# Patient Record
Sex: FEMALE | Race: WHITE | NOT HISPANIC OR LATINO | Employment: OTHER | URBAN - METROPOLITAN AREA
[De-identification: names, ages, dates, MRNs, and addresses within clinical notes are randomized per-mention and may not be internally consistent; named-entity substitution may affect disease eponyms.]

---

## 2017-02-21 ENCOUNTER — CONVERSION ENCOUNTER (OUTPATIENT)
Dept: GENERAL RADIOLOGY | Facility: HOSPITAL | Age: 72
End: 2017-02-21

## 2017-05-05 ENCOUNTER — CONVERSION ENCOUNTER (OUTPATIENT)
Dept: MAMMOGRAPHY | Facility: HOSPITAL | Age: 72
End: 2017-05-05

## 2018-02-23 ENCOUNTER — CONVERSION ENCOUNTER (OUTPATIENT)
Dept: GENERAL RADIOLOGY | Facility: HOSPITAL | Age: 73
End: 2018-02-23

## 2018-05-08 ENCOUNTER — CONVERSION ENCOUNTER (OUTPATIENT)
Dept: ORTHOPEDIC SURGERY | Facility: CLINIC | Age: 73
End: 2018-05-08

## 2018-05-08 ENCOUNTER — OFFICE VISIT CONVERTED (OUTPATIENT)
Dept: ORTHOPEDIC SURGERY | Facility: CLINIC | Age: 73
End: 2018-05-08
Attending: ORTHOPAEDIC SURGERY

## 2019-01-02 ENCOUNTER — HOSPITAL ENCOUNTER (OUTPATIENT)
Dept: LAB | Facility: HOSPITAL | Age: 74
Discharge: HOME | End: 2019-01-02
Attending: INTERNAL MEDICINE

## 2019-01-02 LAB
ANION GAP SERPL CALC-SCNC: 16 MMOL/L (ref 8–19)
APPEARANCE UR: CLEAR
BASOPHILS # BLD AUTO: 0.04 10*3/UL (ref 0–0.2)
BASOPHILS NFR BLD AUTO: 0.58 % (ref 0–3)
BILIRUB UR QL: NEGATIVE
BUN SERPL-MCNC: 11 MG/DL (ref 5–25)
BUN/CREAT SERPL: 16 {RATIO} (ref 6–20)
CALCIUM SERPL-MCNC: 9.4 MG/DL (ref 8.7–10.4)
CHLORIDE SERPL-SCNC: 102 MMOL/L (ref 99–111)
COLOR UR: YELLOW
CONV CO2: 26 MMOL/L (ref 22–32)
CONV COLLECTION SOURCE (UA): NORMAL
CONV UROBILINOGEN IN URINE BY AUTOMATED TEST STRIP: 0.2 {EHRLICHU}/DL (ref 0.1–1)
CREAT UR-MCNC: 0.7 MG/DL (ref 0.5–0.9)
EOSINOPHIL # BLD AUTO: 0.06 10*3/UL (ref 0–0.7)
EOSINOPHIL # BLD AUTO: 0.94 % (ref 0–7)
ERYTHROCYTE [DISTWIDTH] IN BLOOD BY AUTOMATED COUNT: 13 % (ref 11.5–14.5)
GFR SERPLBLD BASED ON 1.73 SQ M-ARVRAT: >60 ML/MIN/{1.73_M2}
GLUCOSE SERPL-MCNC: 90 MG/DL (ref 65–99)
GLUCOSE UR QL: NEGATIVE MG/DL
HBA1C MFR BLD: 14.1 G/DL (ref 12–16)
HCT VFR BLD AUTO: 41.2 % (ref 37–47)
HGB UR QL STRIP: NEGATIVE
KETONES UR QL STRIP: NEGATIVE MG/DL
LEUKOCYTE ESTERASE UR QL STRIP: NEGATIVE
LYMPHOCYTES # BLD AUTO: 1.28 10*3/UL (ref 1–5)
MCH RBC QN AUTO: 30.6 PG (ref 27–31)
MCHC RBC AUTO-ENTMCNC: 34.1 G/DL (ref 33–37)
MCV RBC AUTO: 89.5 FL (ref 81–99)
MONOCYTES # BLD AUTO: 0.62 10*3/UL (ref 0.2–1.2)
MONOCYTES NFR BLD AUTO: 9.93 % (ref 3–10)
NEUTROPHILS # BLD AUTO: 4.25 10*3/UL (ref 2–8)
NEUTROPHILS NFR BLD AUTO: 68.1 % (ref 30–85)
NITRITE UR QL STRIP: NEGATIVE
NRBC BLD AUTO-RTO: 0 % (ref 0–0.01)
OSMOLALITY SERPL CALC.SUM OF ELEC: 289 MOSM/KG (ref 273–304)
PH UR STRIP.AUTO: 7 [PH] (ref 5–8)
PLATELET # BLD AUTO: 297 10*3/UL (ref 130–400)
PMV BLD AUTO: 7.3 FL (ref 7.4–10.4)
POTASSIUM SERPL-SCNC: 4.3 MMOL/L (ref 3.5–5.3)
PROT UR QL: NEGATIVE MG/DL
RBC # BLD AUTO: 4.6 10*6/UL (ref 4.2–5.4)
SODIUM SERPL-SCNC: 140 MMOL/L (ref 135–147)
SP GR UR: 1.01 (ref 1–1.03)
VARIANT LYMPHS NFR BLD MANUAL: 20.5 % (ref 20–45)
WBC # BLD AUTO: 6.24 10*3/UL (ref 4.8–10.8)

## 2019-01-04 LAB — BACTERIA UR CULT: NORMAL

## 2019-01-09 ENCOUNTER — HOSPITAL ENCOUNTER (OUTPATIENT)
Dept: GENERAL RADIOLOGY | Facility: HOSPITAL | Age: 74
Discharge: HOME | End: 2019-01-09
Attending: INTERNAL MEDICINE

## 2019-02-27 ENCOUNTER — HOSPITAL ENCOUNTER (OUTPATIENT)
Dept: GENERAL RADIOLOGY | Facility: HOSPITAL | Age: 74
Discharge: HOME | End: 2019-02-27
Attending: NURSE PRACTITIONER

## 2019-04-12 ENCOUNTER — HOSPITAL ENCOUNTER (OUTPATIENT)
Dept: LAB | Facility: HOSPITAL | Age: 74
Discharge: HOME | End: 2019-04-12
Attending: INTERNAL MEDICINE

## 2019-04-12 LAB
25(OH)D3 SERPL-MCNC: 44.3 NG/ML (ref 30–100)
ALBUMIN SERPL-MCNC: 4 G/DL (ref 3.5–5)
ALBUMIN/GLOB SERPL: 1.3 {RATIO} (ref 1.4–2.6)
ALP SERPL-CCNC: 59 U/L (ref 43–160)
ALT SERPL-CCNC: 15 U/L (ref 10–40)
ANION GAP SERPL CALC-SCNC: 13 MMOL/L (ref 8–19)
AST SERPL-CCNC: 20 U/L (ref 15–50)
BASOPHILS # BLD AUTO: 0.04 10*3/UL (ref 0–0.2)
BASOPHILS NFR BLD AUTO: 0.8 % (ref 0–3)
BILIRUB SERPL-MCNC: 0.72 MG/DL (ref 0.2–1.3)
BUN SERPL-MCNC: 10 MG/DL (ref 5–25)
BUN/CREAT SERPL: 14 {RATIO} (ref 6–20)
CALCIUM SERPL-MCNC: 9.1 MG/DL (ref 8.7–10.4)
CHLORIDE SERPL-SCNC: 103 MMOL/L (ref 99–111)
CHOLEST SERPL-MCNC: 174 MG/DL (ref 107–200)
CHOLEST/HDLC SERPL: 2.7 {RATIO} (ref 3–6)
CONV ABS IMM GRAN: 0.03 10*3/UL (ref 0–0.2)
CONV CO2: 30 MMOL/L (ref 22–32)
CONV IMMATURE GRAN: 0.6 % (ref 0–1.8)
CONV TOTAL PROTEIN: 7.2 G/DL (ref 6.3–8.2)
CREAT UR-MCNC: 0.7 MG/DL (ref 0.5–0.9)
DEPRECATED RDW RBC AUTO: 48.1 FL (ref 36.4–46.3)
EOSINOPHIL # BLD AUTO: 0.21 10*3/UL (ref 0–0.7)
EOSINOPHIL # BLD AUTO: 4.3 % (ref 0–7)
ERYTHROCYTE [DISTWIDTH] IN BLOOD BY AUTOMATED COUNT: 14 % (ref 11.7–14.4)
EST. AVERAGE GLUCOSE BLD GHB EST-MCNC: 126 MG/DL
GFR SERPLBLD BASED ON 1.73 SQ M-ARVRAT: >60 ML/MIN/{1.73_M2}
GLOBULIN UR ELPH-MCNC: 3.2 G/DL (ref 2–3.5)
GLUCOSE SERPL-MCNC: 99 MG/DL (ref 65–99)
HBA1C MFR BLD: 13.4 G/DL (ref 12–16)
HBA1C MFR BLD: 6 % (ref 3.5–5.7)
HCT VFR BLD AUTO: 41.7 % (ref 37–47)
HDLC SERPL-MCNC: 65 MG/DL (ref 40–60)
LDLC SERPL CALC-MCNC: 93 MG/DL (ref 70–100)
LYMPHOCYTES # BLD AUTO: 1.36 10*3/UL (ref 1–5)
MCH RBC QN AUTO: 29.9 PG (ref 27–31)
MCHC RBC AUTO-ENTMCNC: 32.1 G/DL (ref 33–37)
MCV RBC AUTO: 93.1 FL (ref 81–99)
MONOCYTES # BLD AUTO: 0.64 10*3/UL (ref 0.2–1.2)
MONOCYTES NFR BLD AUTO: 13.1 % (ref 3–10)
NEUTROPHILS # BLD AUTO: 2.59 10*3/UL (ref 2–8)
NEUTROPHILS NFR BLD AUTO: 53.3 % (ref 30–85)
NRBC CBCN: 0 % (ref 0–0.7)
OSMOLALITY SERPL CALC.SUM OF ELEC: 293 MOSM/KG (ref 273–304)
PLATELET # BLD AUTO: 260 10*3/UL (ref 130–400)
PMV BLD AUTO: 10.1 FL (ref 9.4–12.3)
POTASSIUM SERPL-SCNC: 4.3 MMOL/L (ref 3.5–5.3)
RBC # BLD AUTO: 4.48 10*6/UL (ref 4.2–5.4)
SODIUM SERPL-SCNC: 142 MMOL/L (ref 135–147)
TRIGL SERPL-MCNC: 79 MG/DL (ref 40–150)
TSH SERPL-ACNC: 1.5 M[IU]/L (ref 0.27–4.2)
VARIANT LYMPHS NFR BLD MANUAL: 27.9 % (ref 20–45)
VLDLC SERPL-MCNC: 16 MG/DL (ref 5–37)
WBC # BLD AUTO: 4.87 10*3/UL (ref 4.8–10.8)

## 2019-10-14 ENCOUNTER — HOSPITAL ENCOUNTER (OUTPATIENT)
Dept: LAB | Facility: HOSPITAL | Age: 74
Discharge: HOME | End: 2019-10-14
Attending: INTERNAL MEDICINE

## 2019-10-14 LAB
25(OH)D3 SERPL-MCNC: 51.1 NG/ML (ref 30–100)
ALBUMIN SERPL-MCNC: 4.1 G/DL (ref 3.5–5)
ALBUMIN/GLOB SERPL: 1.3 {RATIO} (ref 1.4–2.6)
ALP SERPL-CCNC: 63 U/L (ref 43–160)
ALT SERPL-CCNC: 17 U/L (ref 10–40)
ANION GAP SERPL CALC-SCNC: 15 MMOL/L (ref 8–19)
AST SERPL-CCNC: 20 U/L (ref 15–50)
BILIRUB SERPL-MCNC: 0.82 MG/DL (ref 0.2–1.3)
BUN SERPL-MCNC: 8 MG/DL (ref 5–25)
BUN/CREAT SERPL: 10 {RATIO} (ref 6–20)
CALCIUM SERPL-MCNC: 9.4 MG/DL (ref 8.7–10.4)
CHLORIDE SERPL-SCNC: 102 MMOL/L (ref 99–111)
CHOLEST SERPL-MCNC: 167 MG/DL (ref 107–200)
CHOLEST/HDLC SERPL: 2.5 {RATIO} (ref 3–6)
CONV CO2: 27 MMOL/L (ref 22–32)
CONV TOTAL PROTEIN: 7.3 G/DL (ref 6.3–8.2)
CREAT UR-MCNC: 0.77 MG/DL (ref 0.5–0.9)
GFR SERPLBLD BASED ON 1.73 SQ M-ARVRAT: >60 ML/MIN/{1.73_M2}
GLOBULIN UR ELPH-MCNC: 3.2 G/DL (ref 2–3.5)
GLUCOSE SERPL-MCNC: 106 MG/DL (ref 65–99)
HDLC SERPL-MCNC: 67 MG/DL (ref 40–60)
LDLC SERPL CALC-MCNC: 82 MG/DL (ref 70–100)
OSMOLALITY SERPL CALC.SUM OF ELEC: 289 MOSM/KG (ref 273–304)
POTASSIUM SERPL-SCNC: 4.3 MMOL/L (ref 3.5–5.3)
SODIUM SERPL-SCNC: 140 MMOL/L (ref 135–147)
TRIGL SERPL-MCNC: 88 MG/DL (ref 40–150)
TSH SERPL-ACNC: 0.62 M[IU]/L (ref 0.27–4.2)
VLDLC SERPL-MCNC: 18 MG/DL (ref 5–37)

## 2019-11-07 ENCOUNTER — HOSPITAL ENCOUNTER (OUTPATIENT)
Dept: GASTROENTEROLOGY | Facility: HOSPITAL | Age: 74
Setting detail: HOSPITAL OUTPATIENT SURGERY
Discharge: HOME | End: 2019-11-07
Attending: INTERNAL MEDICINE

## 2020-03-02 ENCOUNTER — HOSPITAL ENCOUNTER (OUTPATIENT)
Dept: GENERAL RADIOLOGY | Facility: HOSPITAL | Age: 75
Discharge: HOME | End: 2020-03-02
Attending: OBSTETRICS & GYNECOLOGY

## 2020-04-21 ENCOUNTER — HOSPITAL ENCOUNTER (OUTPATIENT)
Dept: LAB | Facility: HOSPITAL | Age: 75
Discharge: HOME | End: 2020-04-21
Attending: INTERNAL MEDICINE

## 2020-04-21 LAB
ALBUMIN SERPL-MCNC: 4.2 G/DL (ref 3.5–5)
ALBUMIN/GLOB SERPL: 1.2 {RATIO} (ref 1.4–2.6)
ALP SERPL-CCNC: 57 U/L (ref 43–160)
ALT SERPL-CCNC: 17 U/L (ref 10–40)
ANION GAP SERPL CALC-SCNC: 21 MMOL/L (ref 8–19)
AST SERPL-CCNC: 20 U/L (ref 15–50)
BASOPHILS # BLD AUTO: 0.06 10*3/UL (ref 0–0.2)
BASOPHILS NFR BLD AUTO: 1 % (ref 0–3)
BILIRUB SERPL-MCNC: 0.76 MG/DL (ref 0.2–1.3)
BUN SERPL-MCNC: 10 MG/DL (ref 5–25)
BUN/CREAT SERPL: 14 {RATIO} (ref 6–20)
CALCIUM SERPL-MCNC: 9.4 MG/DL (ref 8.7–10.4)
CHLORIDE SERPL-SCNC: 102 MMOL/L (ref 99–111)
CHOLEST SERPL-MCNC: 167 MG/DL (ref 107–200)
CHOLEST/HDLC SERPL: 2.7 {RATIO} (ref 3–6)
CONV ABS IMM GRAN: 0.04 10*3/UL (ref 0–0.2)
CONV CO2: 23 MMOL/L (ref 22–32)
CONV IMMATURE GRAN: 0.7 % (ref 0–1.8)
CONV TOTAL PROTEIN: 7.8 G/DL (ref 6.3–8.2)
CREAT UR-MCNC: 0.71 MG/DL (ref 0.5–0.9)
DEPRECATED RDW RBC AUTO: 49.6 FL (ref 36.4–46.3)
EOSINOPHIL # BLD AUTO: 0.21 10*3/UL (ref 0–0.7)
EOSINOPHIL # BLD AUTO: 3.6 % (ref 0–7)
ERYTHROCYTE [DISTWIDTH] IN BLOOD BY AUTOMATED COUNT: 14.2 % (ref 11.7–14.4)
FOLATE SERPL-MCNC: 17.7 NG/ML (ref 4.8–20)
GFR SERPLBLD BASED ON 1.73 SQ M-ARVRAT: >60 ML/MIN/{1.73_M2}
GLOBULIN UR ELPH-MCNC: 3.6 G/DL (ref 2–3.5)
GLUCOSE SERPL-MCNC: 101 MG/DL (ref 65–99)
HCT VFR BLD AUTO: 44.1 % (ref 37–47)
HDLC SERPL-MCNC: 63 MG/DL (ref 40–60)
HGB BLD-MCNC: 13.9 G/DL (ref 12–16)
LDLC SERPL CALC-MCNC: 81 MG/DL (ref 70–100)
LYMPHOCYTES # BLD AUTO: 1.39 10*3/UL (ref 1–5)
LYMPHOCYTES NFR BLD AUTO: 24 % (ref 20–45)
MCH RBC QN AUTO: 29.7 PG (ref 27–31)
MCHC RBC AUTO-ENTMCNC: 31.5 G/DL (ref 33–37)
MCV RBC AUTO: 94.2 FL (ref 81–99)
MONOCYTES # BLD AUTO: 0.73 10*3/UL (ref 0.2–1.2)
MONOCYTES NFR BLD AUTO: 12.6 % (ref 3–10)
NEUTROPHILS # BLD AUTO: 3.37 10*3/UL (ref 2–8)
NEUTROPHILS NFR BLD AUTO: 58.1 % (ref 30–85)
NRBC CBCN: 0 % (ref 0–0.7)
OSMOLALITY SERPL CALC.SUM OF ELEC: 293 MOSM/KG (ref 273–304)
PLATELET # BLD AUTO: 277 10*3/UL (ref 130–400)
PMV BLD AUTO: 10.3 FL (ref 9.4–12.3)
POTASSIUM SERPL-SCNC: 4.3 MMOL/L (ref 3.5–5.3)
RBC # BLD AUTO: 4.68 10*6/UL (ref 4.2–5.4)
SODIUM SERPL-SCNC: 142 MMOL/L (ref 135–147)
TRIGL SERPL-MCNC: 113 MG/DL (ref 40–150)
TSH SERPL-ACNC: 1.36 M[IU]/L (ref 0.27–4.2)
VIT B12 SERPL-MCNC: 595 PG/ML (ref 211–911)
VLDLC SERPL-MCNC: 23 MG/DL (ref 5–37)
WBC # BLD AUTO: 5.8 10*3/UL (ref 4.8–10.8)

## 2020-04-22 LAB
EST. AVERAGE GLUCOSE BLD GHB EST-MCNC: 134 MG/DL
HBA1C MFR BLD: 6.3 % (ref 3.5–5.7)

## 2020-07-02 ENCOUNTER — HOSPITAL ENCOUNTER (OUTPATIENT)
Dept: GENERAL RADIOLOGY | Facility: HOSPITAL | Age: 75
Discharge: HOME | End: 2020-07-02
Attending: INTERNAL MEDICINE

## 2020-10-21 ENCOUNTER — HOSPITAL ENCOUNTER (OUTPATIENT)
Dept: LAB | Facility: HOSPITAL | Age: 75
Discharge: HOME | End: 2020-10-21
Attending: INTERNAL MEDICINE

## 2020-10-21 LAB
25(OH)D3 SERPL-MCNC: 43.7 NG/ML (ref 30–100)
ALBUMIN SERPL-MCNC: 4 G/DL (ref 3.5–5)
ALBUMIN/GLOB SERPL: 1.2 {RATIO} (ref 1.4–2.6)
ALP SERPL-CCNC: 52 U/L (ref 43–160)
ALT SERPL-CCNC: 15 U/L (ref 10–40)
ANION GAP SERPL CALC-SCNC: 15 MMOL/L (ref 8–19)
AST SERPL-CCNC: 20 U/L (ref 15–50)
BASOPHILS # BLD AUTO: 0.05 10*3/UL (ref 0–0.2)
BASOPHILS NFR BLD AUTO: 1 % (ref 0–3)
BILIRUB SERPL-MCNC: 0.88 MG/DL (ref 0.2–1.3)
BUN SERPL-MCNC: 10 MG/DL (ref 5–25)
BUN/CREAT SERPL: 14 {RATIO} (ref 6–20)
CALCIUM SERPL-MCNC: 9.6 MG/DL (ref 8.7–10.4)
CHLORIDE SERPL-SCNC: 100 MMOL/L (ref 99–111)
CHOLEST SERPL-MCNC: 167 MG/DL (ref 107–200)
CHOLEST/HDLC SERPL: 2.7 {RATIO} (ref 3–6)
CONV ABS IMM GRAN: 0.02 10*3/UL (ref 0–0.2)
CONV CO2: 28 MMOL/L (ref 22–32)
CONV IMMATURE GRAN: 0.4 % (ref 0–1.8)
CONV TOTAL PROTEIN: 7.3 G/DL (ref 6.3–8.2)
CREAT UR-MCNC: 0.71 MG/DL (ref 0.5–0.9)
DEPRECATED RDW RBC AUTO: 48.9 FL (ref 36.4–46.3)
EOSINOPHIL # BLD AUTO: 0.2 10*3/UL (ref 0–0.7)
EOSINOPHIL # BLD AUTO: 4 % (ref 0–7)
ERYTHROCYTE [DISTWIDTH] IN BLOOD BY AUTOMATED COUNT: 14.2 % (ref 11.7–14.4)
EST. AVERAGE GLUCOSE BLD GHB EST-MCNC: 123 MG/DL
FOLATE SERPL-MCNC: 16 NG/ML (ref 4.8–20)
GFR SERPLBLD BASED ON 1.73 SQ M-ARVRAT: >60 ML/MIN/{1.73_M2}
GLOBULIN UR ELPH-MCNC: 3.3 G/DL (ref 2–3.5)
GLUCOSE SERPL-MCNC: 91 MG/DL (ref 65–99)
HBA1C MFR BLD: 5.9 % (ref 3.5–5.7)
HCT VFR BLD AUTO: 42.8 % (ref 37–47)
HDLC SERPL-MCNC: 61 MG/DL (ref 40–60)
HGB BLD-MCNC: 13.6 G/DL (ref 12–16)
LDLC SERPL CALC-MCNC: 88 MG/DL (ref 70–100)
LYMPHOCYTES # BLD AUTO: 1.22 10*3/UL (ref 1–5)
LYMPHOCYTES NFR BLD AUTO: 24.5 % (ref 20–45)
MCH RBC QN AUTO: 29.8 PG (ref 27–31)
MCHC RBC AUTO-ENTMCNC: 31.8 G/DL (ref 33–37)
MCV RBC AUTO: 93.9 FL (ref 81–99)
MONOCYTES # BLD AUTO: 0.68 10*3/UL (ref 0.2–1.2)
MONOCYTES NFR BLD AUTO: 13.7 % (ref 3–10)
NEUTROPHILS # BLD AUTO: 2.81 10*3/UL (ref 2–8)
NEUTROPHILS NFR BLD AUTO: 56.4 % (ref 30–85)
NRBC CBCN: 0 % (ref 0–0.7)
OSMOLALITY SERPL CALC.SUM OF ELEC: 287 MOSM/KG (ref 273–304)
PLATELET # BLD AUTO: 272 10*3/UL (ref 130–400)
PMV BLD AUTO: 10.5 FL (ref 9.4–12.3)
POTASSIUM SERPL-SCNC: 4.1 MMOL/L (ref 3.5–5.3)
RBC # BLD AUTO: 4.56 10*6/UL (ref 4.2–5.4)
SODIUM SERPL-SCNC: 139 MMOL/L (ref 135–147)
TRIGL SERPL-MCNC: 90 MG/DL (ref 40–150)
TSH SERPL-ACNC: 0.57 M[IU]/L (ref 0.27–4.2)
VIT B12 SERPL-MCNC: 703 PG/ML (ref 211–911)
VLDLC SERPL-MCNC: 18 MG/DL (ref 5–37)
WBC # BLD AUTO: 4.98 10*3/UL (ref 4.8–10.8)

## 2021-01-26 ENCOUNTER — HOSPITAL ENCOUNTER (OUTPATIENT)
Dept: OTHER | Facility: HOSPITAL | Age: 76
Discharge: HOME | End: 2021-01-26
Attending: INTERNAL MEDICINE

## 2021-02-19 ENCOUNTER — HOSPITAL ENCOUNTER (OUTPATIENT)
Dept: VACCINE CLINIC | Facility: HOSPITAL | Age: 76
Discharge: HOME | End: 2021-02-19
Attending: INTERNAL MEDICINE

## 2021-04-08 ENCOUNTER — HOSPITAL ENCOUNTER (OUTPATIENT)
Dept: LAB | Facility: HOSPITAL | Age: 76
Discharge: HOME | End: 2021-04-08
Attending: INTERNAL MEDICINE

## 2021-04-08 LAB
ALBUMIN SERPL-MCNC: 3.7 G/DL (ref 3.5–5)
ALBUMIN/GLOB SERPL: 1.2 {RATIO} (ref 1.4–2.6)
ALP SERPL-CCNC: 48 U/L (ref 43–160)
ALT SERPL-CCNC: 19 U/L (ref 10–40)
ANION GAP SERPL CALC-SCNC: 13 MMOL/L (ref 8–19)
AST SERPL-CCNC: 19 U/L (ref 15–50)
BILIRUB SERPL-MCNC: 0.92 MG/DL (ref 0.2–1.3)
BUN SERPL-MCNC: 12 MG/DL (ref 5–25)
BUN/CREAT SERPL: 16 {RATIO} (ref 6–20)
CALCIUM SERPL-MCNC: 8.9 MG/DL (ref 8.7–10.4)
CHLORIDE SERPL-SCNC: 102 MMOL/L (ref 99–111)
CHOLEST SERPL-MCNC: 168 MG/DL (ref 107–200)
CHOLEST/HDLC SERPL: 2.6 {RATIO} (ref 3–6)
CONV CO2: 27 MMOL/L (ref 22–32)
CONV TOTAL PROTEIN: 6.9 G/DL (ref 6.3–8.2)
CREAT UR-MCNC: 0.73 MG/DL (ref 0.5–0.9)
EST. AVERAGE GLUCOSE BLD GHB EST-MCNC: 126 MG/DL
GFR SERPLBLD BASED ON 1.73 SQ M-ARVRAT: >60 ML/MIN/{1.73_M2}
GLOBULIN UR ELPH-MCNC: 3.2 G/DL (ref 2–3.5)
GLUCOSE SERPL-MCNC: 99 MG/DL (ref 65–99)
HBA1C MFR BLD: 6 % (ref 3.5–5.7)
HDLC SERPL-MCNC: 65 MG/DL (ref 40–60)
LDLC SERPL CALC-MCNC: 80 MG/DL (ref 70–100)
OSMOLALITY SERPL CALC.SUM OF ELEC: 286 MOSM/KG (ref 273–304)
POTASSIUM SERPL-SCNC: 4.1 MMOL/L (ref 3.5–5.3)
SODIUM SERPL-SCNC: 138 MMOL/L (ref 135–147)
TRIGL SERPL-MCNC: 117 MG/DL (ref 40–150)
TSH SERPL-ACNC: 0.67 M[IU]/L (ref 0.27–4.2)
VLDLC SERPL-MCNC: 23 MG/DL (ref 5–37)

## 2021-04-29 ENCOUNTER — HOSPITAL ENCOUNTER (OUTPATIENT)
Dept: GENERAL RADIOLOGY | Facility: HOSPITAL | Age: 76
Discharge: HOME | End: 2021-04-29
Attending: NURSE PRACTITIONER

## 2021-05-16 VITALS — HEART RATE: 79 BPM | BODY MASS INDEX: 33.4 KG/M2 | WEIGHT: 181.5 LBS | HEIGHT: 62 IN | OXYGEN SATURATION: 96 %

## 2021-08-31 RX ORDER — METOPROLOL SUCCINATE 25 MG/1
TABLET, EXTENDED RELEASE ORAL
Qty: 90 TABLET | Refills: 0 | Status: SHIPPED | OUTPATIENT
Start: 2021-08-31 | End: 2021-10-08

## 2021-10-08 ENCOUNTER — HOSPITAL ENCOUNTER (INPATIENT)
Facility: HOSPITAL | Age: 76
LOS: 1 days | Discharge: HOME | End: 2021-10-09
Attending: EMERGENCY MEDICINE | Admitting: INTERNAL MEDICINE

## 2021-10-08 ENCOUNTER — APPOINTMENT (OUTPATIENT)
Dept: GENERAL RADIOLOGY | Facility: HOSPITAL | Age: 76
End: 2021-10-08

## 2021-10-08 DIAGNOSIS — R53.1 GENERALIZED WEAKNESS: ICD-10-CM

## 2021-10-08 DIAGNOSIS — R19.7 DIARRHEA, UNSPECIFIED TYPE: ICD-10-CM

## 2021-10-08 DIAGNOSIS — R55 SYNCOPE AND COLLAPSE: Primary | ICD-10-CM

## 2021-10-08 LAB
ALBUMIN SERPL-MCNC: 3.8 G/DL (ref 3.5–5.2)
ALBUMIN/GLOB SERPL: 1.1 G/DL
ALP SERPL-CCNC: 56 U/L (ref 39–117)
ALT SERPL W P-5'-P-CCNC: 13 U/L (ref 1–33)
ANION GAP SERPL CALCULATED.3IONS-SCNC: 11.2 MMOL/L (ref 5–15)
AST SERPL-CCNC: 17 U/L (ref 1–32)
BASOPHILS # BLD AUTO: 0.02 10*3/MM3 (ref 0–0.2)
BASOPHILS NFR BLD AUTO: 0.2 % (ref 0–1.5)
BILIRUB SERPL-MCNC: 1.3 MG/DL (ref 0–1.2)
BUN SERPL-MCNC: 10 MG/DL (ref 8–23)
BUN/CREAT SERPL: 13.3 (ref 7–25)
CALCIUM SPEC-SCNC: 9 MG/DL (ref 8.6–10.5)
CHLORIDE SERPL-SCNC: 98 MMOL/L (ref 98–107)
CO2 SERPL-SCNC: 25.8 MMOL/L (ref 22–29)
CREAT SERPL-MCNC: 0.75 MG/DL (ref 0.57–1)
DEPRECATED RDW RBC AUTO: 47.2 FL (ref 37–54)
EOSINOPHIL # BLD AUTO: 0.01 10*3/MM3 (ref 0–0.4)
EOSINOPHIL NFR BLD AUTO: 0.1 % (ref 0.3–6.2)
ERYTHROCYTE [DISTWIDTH] IN BLOOD BY AUTOMATED COUNT: 13.9 % (ref 12.3–15.4)
GFR SERPL CREATININE-BSD FRML MDRD: 75 ML/MIN/1.73
GLOBULIN UR ELPH-MCNC: 3.5 GM/DL
GLUCOSE SERPL-MCNC: 160 MG/DL (ref 65–99)
HCT VFR BLD AUTO: 43 % (ref 34–46.6)
HGB BLD-MCNC: 14 G/DL (ref 12–15.9)
HOLD SPECIMEN: NORMAL
HOLD SPECIMEN: NORMAL
IMM GRANULOCYTES # BLD AUTO: 0.06 10*3/MM3 (ref 0–0.05)
IMM GRANULOCYTES NFR BLD AUTO: 0.5 % (ref 0–0.5)
LIPASE SERPL-CCNC: 35 U/L (ref 13–60)
LYMPHOCYTES # BLD AUTO: 0.69 10*3/MM3 (ref 0.7–3.1)
LYMPHOCYTES NFR BLD AUTO: 6.2 % (ref 19.6–45.3)
MCH RBC QN AUTO: 29.7 PG (ref 26.6–33)
MCHC RBC AUTO-ENTMCNC: 32.6 G/DL (ref 31.5–35.7)
MCV RBC AUTO: 91.1 FL (ref 79–97)
MONOCYTES # BLD AUTO: 1 10*3/MM3 (ref 0.1–0.9)
MONOCYTES NFR BLD AUTO: 9 % (ref 5–12)
NEUTROPHILS NFR BLD AUTO: 84 % (ref 42.7–76)
NEUTROPHILS NFR BLD AUTO: 9.38 10*3/MM3 (ref 1.7–7)
NRBC BLD AUTO-RTO: 0 /100 WBC (ref 0–0.2)
PLATELET # BLD AUTO: 241 10*3/MM3 (ref 140–450)
PMV BLD AUTO: 9.9 FL (ref 6–12)
POTASSIUM SERPL-SCNC: 4.1 MMOL/L (ref 3.5–5.2)
PROT SERPL-MCNC: 7.3 G/DL (ref 6–8.5)
RBC # BLD AUTO: 4.72 10*6/MM3 (ref 3.77–5.28)
SODIUM SERPL-SCNC: 135 MMOL/L (ref 136–145)
TROPONIN T SERPL-MCNC: <0.01 NG/ML (ref 0–0.03)
WBC # BLD AUTO: 11.16 10*3/MM3 (ref 3.4–10.8)
WHOLE BLOOD HOLD SPECIMEN: NORMAL
WHOLE BLOOD HOLD SPECIMEN: NORMAL

## 2021-10-08 PROCEDURE — 80053 COMPREHEN METABOLIC PANEL: CPT | Performed by: EMERGENCY MEDICINE

## 2021-10-08 PROCEDURE — 85025 COMPLETE CBC W/AUTO DIFF WBC: CPT | Performed by: EMERGENCY MEDICINE

## 2021-10-08 PROCEDURE — 93010 ELECTROCARDIOGRAM REPORT: CPT | Performed by: INTERNAL MEDICINE

## 2021-10-08 PROCEDURE — 74022 RADEX COMPL AQT ABD SERIES: CPT

## 2021-10-08 PROCEDURE — 87635 SARS-COV-2 COVID-19 AMP PRB: CPT | Performed by: INTERNAL MEDICINE

## 2021-10-08 PROCEDURE — 99284 EMERGENCY DEPT VISIT MOD MDM: CPT

## 2021-10-08 PROCEDURE — 84484 ASSAY OF TROPONIN QUANT: CPT | Performed by: EMERGENCY MEDICINE

## 2021-10-08 PROCEDURE — 93005 ELECTROCARDIOGRAM TRACING: CPT | Performed by: EMERGENCY MEDICINE

## 2021-10-08 PROCEDURE — 25010000002 ENOXAPARIN PER 10 MG: Performed by: INTERNAL MEDICINE

## 2021-10-08 PROCEDURE — 83690 ASSAY OF LIPASE: CPT | Performed by: EMERGENCY MEDICINE

## 2021-10-08 RX ORDER — ACETAMINOPHEN 650 MG/1
650 SUPPOSITORY RECTAL EVERY 4 HOURS PRN
Status: DISCONTINUED | OUTPATIENT
Start: 2021-10-08 | End: 2021-10-09 | Stop reason: HOSPADM

## 2021-10-08 RX ORDER — ALUMINA, MAGNESIA, AND SIMETHICONE 2400; 2400; 240 MG/30ML; MG/30ML; MG/30ML
15 SUSPENSION ORAL EVERY 6 HOURS PRN
Status: DISCONTINUED | OUTPATIENT
Start: 2021-10-08 | End: 2021-10-09 | Stop reason: HOSPADM

## 2021-10-08 RX ORDER — ACETAMINOPHEN 325 MG/1
325 TABLET ORAL DAILY PRN
COMMUNITY
End: 2022-04-05

## 2021-10-08 RX ORDER — LEVOTHYROXINE SODIUM 0.1 MG/1
100 TABLET ORAL EVERY MORNING
COMMUNITY
Start: 2021-08-01 | End: 2021-10-18 | Stop reason: SDUPTHER

## 2021-10-08 RX ORDER — CHOLECALCIFEROL (VITAMIN D3) 125 MCG
5 CAPSULE ORAL NIGHTLY PRN
Status: DISCONTINUED | OUTPATIENT
Start: 2021-10-08 | End: 2021-10-09 | Stop reason: HOSPADM

## 2021-10-08 RX ORDER — LEVOTHYROXINE SODIUM 0.1 MG/1
100 TABLET ORAL EVERY MORNING
Status: DISCONTINUED | OUTPATIENT
Start: 2021-10-09 | End: 2021-10-09 | Stop reason: HOSPADM

## 2021-10-08 RX ORDER — POLYETHYLENE GLYCOL 3350 17 G/17G
17 POWDER, FOR SOLUTION ORAL DAILY PRN
Status: DISCONTINUED | OUTPATIENT
Start: 2021-10-08 | End: 2021-10-09 | Stop reason: HOSPADM

## 2021-10-08 RX ORDER — SODIUM CHLORIDE 0.9 % (FLUSH) 0.9 %
10 SYRINGE (ML) INJECTION EVERY 12 HOURS SCHEDULED
Status: DISCONTINUED | OUTPATIENT
Start: 2021-10-08 | End: 2021-10-09 | Stop reason: HOSPADM

## 2021-10-08 RX ORDER — SODIUM CHLORIDE 9 MG/ML
100 INJECTION, SOLUTION INTRAVENOUS CONTINUOUS
Status: DISCONTINUED | OUTPATIENT
Start: 2021-10-08 | End: 2021-10-09 | Stop reason: HOSPADM

## 2021-10-08 RX ORDER — ESCITALOPRAM OXALATE 5 MG/1
5 TABLET ORAL DAILY
COMMUNITY
End: 2021-10-18 | Stop reason: SDUPTHER

## 2021-10-08 RX ORDER — METOPROLOL SUCCINATE 25 MG/1
25 TABLET, EXTENDED RELEASE ORAL DAILY
Status: DISCONTINUED | OUTPATIENT
Start: 2021-10-08 | End: 2021-10-09 | Stop reason: HOSPADM

## 2021-10-08 RX ORDER — BISACODYL 10 MG
10 SUPPOSITORY, RECTAL RECTAL DAILY PRN
Status: DISCONTINUED | OUTPATIENT
Start: 2021-10-08 | End: 2021-10-09 | Stop reason: HOSPADM

## 2021-10-08 RX ORDER — FLUTICASONE PROPIONATE 50 MCG
2 SPRAY, SUSPENSION (ML) NASAL AS NEEDED
COMMUNITY

## 2021-10-08 RX ORDER — METOPROLOL SUCCINATE 25 MG/1
25 TABLET, EXTENDED RELEASE ORAL DAILY
COMMUNITY
End: 2021-10-18 | Stop reason: SDUPTHER

## 2021-10-08 RX ORDER — ESCITALOPRAM OXALATE 10 MG/1
5 TABLET ORAL DAILY
Status: DISCONTINUED | OUTPATIENT
Start: 2021-10-08 | End: 2021-10-09 | Stop reason: HOSPADM

## 2021-10-08 RX ORDER — SODIUM CHLORIDE 0.9 % (FLUSH) 0.9 %
10 SYRINGE (ML) INJECTION AS NEEDED
Status: DISCONTINUED | OUTPATIENT
Start: 2021-10-08 | End: 2021-10-09 | Stop reason: HOSPADM

## 2021-10-08 RX ORDER — ONDANSETRON 2 MG/ML
4 INJECTION INTRAMUSCULAR; INTRAVENOUS EVERY 4 HOURS PRN
Status: DISCONTINUED | OUTPATIENT
Start: 2021-10-08 | End: 2021-10-09 | Stop reason: HOSPADM

## 2021-10-08 RX ORDER — SIMVASTATIN 10 MG
10 TABLET ORAL EVERY OTHER DAY
COMMUNITY
End: 2021-10-18 | Stop reason: SDUPTHER

## 2021-10-08 RX ORDER — AMOXICILLIN 250 MG
1 CAPSULE ORAL 2 TIMES DAILY PRN
Status: DISCONTINUED | OUTPATIENT
Start: 2021-10-08 | End: 2021-10-09 | Stop reason: HOSPADM

## 2021-10-08 RX ORDER — FAMOTIDINE 20 MG/1
20 TABLET, FILM COATED ORAL NIGHTLY
Status: DISCONTINUED | OUTPATIENT
Start: 2021-10-08 | End: 2021-10-09 | Stop reason: HOSPADM

## 2021-10-08 RX ORDER — SODIUM CHLORIDE 0.9 % (FLUSH) 0.9 %
10 SYRINGE (ML) INJECTION AS NEEDED
Status: DISCONTINUED | OUTPATIENT
Start: 2021-10-08 | End: 2021-10-08

## 2021-10-08 RX ORDER — ACETAMINOPHEN 325 MG/1
650 TABLET ORAL EVERY 4 HOURS PRN
Status: DISCONTINUED | OUTPATIENT
Start: 2021-10-08 | End: 2021-10-09 | Stop reason: HOSPADM

## 2021-10-08 RX ORDER — ACETAMINOPHEN 160 MG/5ML
650 SOLUTION ORAL EVERY 4 HOURS PRN
Status: DISCONTINUED | OUTPATIENT
Start: 2021-10-08 | End: 2021-10-09 | Stop reason: HOSPADM

## 2021-10-08 RX ORDER — BISACODYL 5 MG/1
5 TABLET, DELAYED RELEASE ORAL DAILY PRN
Status: DISCONTINUED | OUTPATIENT
Start: 2021-10-08 | End: 2021-10-09 | Stop reason: HOSPADM

## 2021-10-08 RX ORDER — CALCIUM CARBONATE 200(500)MG
2 TABLET,CHEWABLE ORAL 3 TIMES DAILY PRN
Status: DISCONTINUED | OUTPATIENT
Start: 2021-10-08 | End: 2021-10-09 | Stop reason: HOSPADM

## 2021-10-08 RX ADMIN — SODIUM CHLORIDE 100 ML/HR: 9 INJECTION, SOLUTION INTRAVENOUS at 19:25

## 2021-10-08 RX ADMIN — SODIUM CHLORIDE 1000 ML: 9 INJECTION, SOLUTION INTRAVENOUS at 12:55

## 2021-10-08 RX ADMIN — FAMOTIDINE 20 MG: 20 TABLET ORAL at 20:36

## 2021-10-08 RX ADMIN — ACETAMINOPHEN 650 MG: 325 TABLET ORAL at 22:12

## 2021-10-08 RX ADMIN — ESCITALOPRAM OXALATE 5 MG: 10 TABLET ORAL at 20:36

## 2021-10-08 RX ADMIN — ENOXAPARIN SODIUM 40 MG: 40 INJECTION SUBCUTANEOUS at 20:37

## 2021-10-08 NOTE — ED NOTES
Pt. Was assisted to bathroom but could not walk or stand properly. Was brought a bed side camode.    MD made aware of patients status on ambulation     Fabienne [facility]  10/08/21 4962

## 2021-10-08 NOTE — PLAN OF CARE
Goal Outcome Evaluation:         Pt resting in room. Anxious to speak to dr about why she is admitted. Aox3, able to assist x 1 to bathroom. No skin issues. Some scattered bruising from fall at home with syncope.

## 2021-10-08 NOTE — ED TRIAGE NOTES
PT arrived via [facility] from home after family was unable to contact her this morning. PT had abdominal pains at 1200 07/oct/2021 with bouts of diarrhea through the night. PT was found on the floor of her bathroom. Upon awakening was AOx4. VSS for transport.

## 2021-10-08 NOTE — H&P
"Patient Name: Kelsey Durand Date of Admission: 10/8/2021   : 1945 MRN: [id number]   Location: [facility] ED CAYLA/CAYLA CSN: [id number]       [facility]   HISTORY AND PHYSICAL  DATE: 10/8/2021  Primary Care Provider  Chris Elder MD    Subjective   Subjective     Chief Complaint   Syncope.    History of Present Illness  Kelsey Durand is a pleasant 76 y.o. female with underlying hyperlipidemia, hypothyroidism, IFG, generalized anxiety disorder, who is coming in for an episode of syncope.  I agree with the ER physician's assessment below in regards to patient being dehydrated secondary to recent nausea and diarrhea, but she is too weak even after aggressive IV fluids in the ER to go home.  As a precaution we will admit her to a telemetry bed.  Further evaluation forthcoming.    ER eval = \"Kelsey Durand is a 76 y.o. female who presents to the emergency department today with complaints of fatigue since last night. Pt states sx started with generalized myalgias followed by several episodes of diarrhea and abdominal cramping. Pt goes on to note she was walking to the restroom this morning when she \"woke up on the floor\". She complains of associated generalized body weakness. She follows with Dr. Jael MD, for her primary care needs. She denies fever, nausea, emesis, chest pain, headache, visual changes, confusion, neck pain, back pain, or any other pertinent sx or concerns.\"    Personal History     Past Medical History:   Diagnosis Date   • Disease of thyroid gland    • Hyperlipidemia    • Hypertension        Past Surgical History:   Procedure Laterality Date   • COLONOSCOPY     • LEEP       Last office visit 21:  --  --  VISIT :  ANNUAL MEDICARE WELLNESS PHYSICAL  = reviewed all forms in office with pt; no new discoveries; active at mall=class and walks/ bike at home.  H.M. ISSUES:  BMI 30 and d/w watch carbs and walk.  --  \"HYPERTENSION\" controlled=better at home='s " (off meds good while)---> d/w check occ and let us know=fine at home as of 4/21.  LIPIDS at goal with LDL 68...101 so f/u before change...93 fine---> 80 stable 4/21.  --  HYPOTHYROIDISM stable 10/16 with TSH 0.2 still=on same dose many years---> fine 4/21.  IFG mild/stable = FBS 92 with A1C 6.0---> 6.3 in 4/20---> 6.0 in 4/21.  --  ANXIETY D/O 1/19 and has benzo to use PRN; d/w call if feels she needs to use it too often and will add SSRI...cheerful 10/19...will use low dose lexapro as of 4/20 OV---> seems better 10/20.  B12 JGW=700 in 4/20.  --  SYNCOPE is ? seizure per Dr Valadez/Flash in [facility] over the weekend, so will get EEG (tongue bite/incontinent)...EEG was neg 9/15.  --  BMD 5/17 = spine 0.9, hip 0.6 is great---> BMD 7/20 = spine 1.0, hip -0.3 = great!!  VIT D DEF remains stable on 2000 qd...28 and d/w take routinely please...44---> same 10/20.  --  DJD no new c/o.  R KNEE PAIN with no trauma, comes and goes, exam is w/o laxity, so agree with repeat mobic and use brace and then PTA if no better.  --  A.R. no flare.  --  --  MMG 4/29/21 per Dr Lawler/Fabiola Pacheco.  COLON 9/14...polyp was hyperplastic, so defer f/u.  Adacel 3/14, Prevnar '16; Pneumovax #1 10/17; Hep A x2 10/18; d/w Shingrix 10/18; COVID x2 as of 4/21.  ( 7/13, 2 girls here=Leigh Ann (no kids) and Xiao=youngest is boy in 6th grade 10/20 and girl with [employer] is Jr at [employer]).    Social History    reports that she has never smoked. She has never used smokeless tobacco. She reports previous alcohol use. She reports that she does not use drugs.    Family History  family history is not on file. Otherwise pertinent FHx was reviewed and not pertinent to current issue.    Allergies  Allergies   Allergen Reactions   • Sulfa Antibiotics Rash       Home Medications  acetaminophen, escitalopram, fluticasone, levothyroxine, metoprolol succinate XL, psyllium, and simvastatin    Objective   Objective     ROS    Review of Systems   Constitutional:  Positive for fatigue. Negative for fever.   HENT: Negative for dental problem, ear pain and trouble swallowing.    Eyes: Negative for pain and visual disturbance.   Respiratory: Negative for cough and shortness of breath.    Cardiovascular: Negative for chest pain, palpitations and leg swelling.   Gastrointestinal: Positive for diarrhea and nausea. Negative for abdominal pain and vomiting.   Endocrine: Negative for cold intolerance and heat intolerance.   Genitourinary: Negative for dysuria, flank pain and hematuria.   Musculoskeletal: Positive for arthralgias and gait problem. Negative for neck pain.   Skin: Negative for rash and wound.   Allergic/Immunologic: Negative for immunocompromised state.   Neurological: Positive for dizziness, syncope, weakness and light-headedness. Negative for headaches.   Psychiatric/Behavioral: Negative for agitation, confusion, self-injury and suicidal ideas.       Exam    Vitals Signs    Temp:  [99 °F (37.2 °C)-99.3 °F (37.4 °C)] 99.3 °F (37.4 °C)  Heart Rate:  [68-84] 84  Resp:  [12-16] 16  BP: (102-141)/(68-84) 141/72     Physical Exam  Constitutional:       General: She is not in acute distress.     Appearance: Normal appearance. She is not toxic-appearing.   HENT:      Head: Atraumatic.      Right Ear: External ear normal.      Left Ear: External ear normal.      Nose: Nose normal.      Mouth/Throat:      Mouth: Mucous membranes are moist.   Eyes:      General:         Right eye: No discharge.         Left eye: No discharge.      Extraocular Movements: Extraocular movements intact.      Pupils: Pupils are equal, round, and reactive to light.   Cardiovascular:      Rate and Rhythm: Normal rate and regular rhythm.      Pulses: Normal pulses.      Heart sounds: Normal heart sounds. No murmur heard.   No gallop.    Pulmonary:      Effort: Pulmonary effort is normal. No respiratory distress.      Breath sounds: No wheezing, rhonchi or rales.   Abdominal:      General: There is no  distension.      Palpations: Abdomen is soft. There is no mass.      Tenderness: There is no abdominal tenderness. There is no guarding.   Musculoskeletal:         General: No swelling or tenderness.      Cervical back: No tenderness.      Right lower leg: No edema.      Left lower leg: No edema.   Skin:     General: Skin is warm and dry.      Findings: No rash.   Neurological:      General: No focal deficit present.      Mental Status: She is alert and oriented to person, place, and time. Mental status is at baseline.      Motor: Weakness present.      Gait: Gait abnormal.   Psychiatric:         Mood and Affect: Mood normal.         Thought Content: Thought content normal.          Labs:        Lab 10/08/21  1053   WBC 11.16*   HEMOGLOBIN 14.0   HEMATOCRIT 43.0   PLATELETS 241           Lab 10/08/21  1053   SODIUM 135*   POTASSIUM 4.1   CHLORIDE 98   CO2 25.8   BUN 10       Images:    XR Abdomen 2+ VW with Chest 1 VW    Result Date: 10/8/2021  PROCEDURE: XR ABDOMEN 2+ VIEWS W CHEST 1 VW  COMPARISON: [facility], , CHEST AP/PA 1 VIEW, 1/02/2021, 17:18.  INDICATIONS: Abdominal pain and diarrhea  FINDINGS:   The lungs are well-expanded. The heart and pulmonary vasculature are within normal limits. No pleural effusions are identified. There are no active appearing infiltrates.  No evidence of bowel obstruction or pneumoperitoneum.  Pelvic phleboliths are present.  Degenerative changes are present in the hips and lumbar spine.  IMPRESSION: No active disease.  No evidence of bowel obstruction or pneumoperitoneum.  TOMMY CARDOSO MD       Electronically Signed and Approved By: TOMMY CARDOSO MD on 10/08/2021 at 12:54               Assessment / Plan     Impression    1.  Syncope.  Patient has had recent gastrointestinal illness with decreased p.o. intake and diarrhea.  She is somewhat prerenal at this time, so hopefully that is the etiology of her symptoms.  We will have her on telemetry as a  precaution.    2.  Dehydration.  Patient was bolused in the ER, will have her on normal saline at 100 on the floor as well.  Follow-up labs in AM.    3.  Hypothyroidism.  Continue home dosing of Synthroid.    4.  DVT prophylaxis.  Via Lovenox.    5.  PUD prophylaxis.  Via H2 blocker.      Problem List    Active Problems:    Syncope and collapse      Plan:  as above    DVT prophylaxis: Via Lovenox.    CODE STATUS:    Code Status and Medical Interventions:   Ordered at: 10/08/21 1537     Code Status:    CPR     Medical Interventions (Level of Support Prior to Arrest):    Full       Electronically signed by Chris Elder MD, 10/8/2021, 16:40 EDT.

## 2021-10-08 NOTE — ED PROVIDER NOTES
"Subjective   Kelsey Durand is a 76 y.o. female who presents to the emergency department today with complaints of fatigue since last night. Pt states sx started with generalized myalgias followed by several episodes of diarrhea and abdominal cramping. Pt goes on to note she was walking to the restroom this morning when she \"woke up on the floor\". She complains of associated generalized body weakness. She follows with Dr. Jael MD, for her primary care needs. She denies fever, nausea, emesis, chest pain, headache, visual changes, confusion, neck pain, back pain, or any other pertinent sx or concerns.      History provided by:  Patient   used: No        Review of Systems   Constitutional: Positive for fatigue. Negative for chills and fever.   HENT: Negative for congestion, rhinorrhea and sore throat.    Eyes: Negative for pain and visual disturbance.   Respiratory: Negative for apnea, cough, chest tightness and shortness of breath.    Cardiovascular: Negative for chest pain and palpitations.   Gastrointestinal: Positive for abdominal pain and diarrhea. Negative for nausea and vomiting.   Genitourinary: Negative for difficulty urinating and dysuria.   Musculoskeletal: Positive for myalgias. Negative for joint swelling.   Skin: Negative for color change.   Neurological: Positive for syncope and weakness. Negative for seizures and headaches.   Psychiatric/Behavioral: Negative.    All other systems reviewed and are negative.      Past Medical History:   Diagnosis Date   • Disease of thyroid gland    • Hyperlipidemia    • Hypertension        Allergies   Allergen Reactions   • Sulfa Antibiotics Rash       Past Surgical History:   Procedure Laterality Date   • COLONOSCOPY     • LEEP  2000       History reviewed. No pertinent family history.    Social History     Socioeconomic History   • Marital status:      Spouse name: Not on file   • Number of children: Not on file   • Years of education: " Not on file   • Highest education level: Not on file   Tobacco Use   • Smoking status: Never Smoker   • Smokeless tobacco: Never Used   Vaping Use   • Vaping Use: Never used   Substance and Sexual Activity   • Alcohol use: Not Currently   • Drug use: Never   • Sexual activity: Defer         Objective   Physical Exam  Vitals and nursing note reviewed.   Constitutional:       General: She is not in acute distress.     Appearance: Normal appearance. She is not toxic-appearing.   HENT:      Head: Normocephalic and atraumatic.      Jaw: There is normal jaw occlusion.   Eyes:      General: Lids are normal.      Extraocular Movements: Extraocular movements intact.      Conjunctiva/sclera: Conjunctivae normal.      Pupils: Pupils are equal, round, and reactive to light.   Cardiovascular:      Rate and Rhythm: Normal rate and regular rhythm.      Pulses: Normal pulses.      Heart sounds: Normal heart sounds.   Pulmonary:      Effort: Pulmonary effort is normal. No respiratory distress.      Breath sounds: Normal breath sounds. No wheezing or rhonchi.   Abdominal:      General: Abdomen is flat. Bowel sounds are normal.      Palpations: Abdomen is soft.      Tenderness: There is no abdominal tenderness. There is no guarding or rebound.   Musculoskeletal:         General: Normal range of motion.      Cervical back: Normal range of motion and neck supple.      Right lower leg: No edema.      Left lower leg: No edema.   Skin:     General: Skin is warm and dry.   Neurological:      Mental Status: She is alert and oriented to person, place, and time. Mental status is at baseline.   Psychiatric:         Mood and Affect: Mood normal.         Procedures         ED Course  ED Course as of Oct 08 1534   Fri Oct 08, 2021   1134 Nurse reports pt being too weak to stand up and use the restroom     [MW]      ED Course User Index  [MW] Citlalli Jade     Labs Reviewed   COMPREHENSIVE METABOLIC PANEL - Abnormal; Notable for the following  components:       Result Value    Glucose 160 (*)     Sodium 135 (*)     Total Bilirubin 1.3 (*)     All other components within normal limits    Narrative:     GFR Normal >60  Chronic Kidney Disease <60  Kidney Failure <15     CBC WITH AUTO DIFFERENTIAL - Abnormal; Notable for the following components:    WBC 11.16 (*)     Neutrophil % 84.0 (*)     Lymphocyte % 6.2 (*)     Eosinophil % 0.1 (*)     Neutrophils, Absolute 9.38 (*)     Lymphocytes, Absolute 0.69 (*)     Monocytes, Absolute 1.00 (*)     Immature Grans, Absolute 0.06 (*)     All other components within normal limits   LIPASE - Normal   TROPONIN (IN-HOUSE) - Normal    Narrative:     Troponin T Reference Range:  <= 0.03 ng/mL-   Negative for AMI  >0.03 ng/mL-     Abnormal for myocardial necrosis.  Clinicians would have to utilize clinical acumen, EKG, Troponin and serial changes to determine if it is an Acute Myocardial Infarction or myocardial injury due to an underlying chronic condition.       Results may be falsely decreased if patient taking Biotin.     RAINBOW DRAW    Narrative:     The following orders were created for panel order [place] Draw.  Procedure                               Abnormality         Status                     ---------                               -----------         ------                     Green Top (Gel)[[id number]]                                  Final result               Lavender Top[[id number]]                                     Final result               Gold Top - SST[[id number]]                                   Final result               Light Blue Top[[id number]]                                   Final result                 Please view results for these tests on the individual orders.   URINALYSIS W/ MICROSCOPIC IF INDICATED (NO CULTURE)   CBC AND DIFFERENTIAL    Narrative:     The following orders were created for panel order CBC & Differential.  Procedure                               Abnormality         Status                      ---------                               -----------         ------                     CBC Auto Differential[[id number]]        Abnormal            Final result                 Please view results for these tests on the individual orders.   GREEN TOP   LAVENDER TOP   GOLD TOP - SST   LIGHT BLUE TOP             The patient was seen evaluated ED by me.  The above history and physical examination was performed.  Patient was unable to stand up for orthostatic vital signs to be taken nor for her to go to the bathroom.  Patient had to be physically held up by ED staff.  Patient's been given IV fluids.  Patient's work-up was performed and is essentially negative.  I feel her symptoms are most likely related to her being dehydrated from the diarrhea.  However since she is unable to stand she is not safe to be discharged home and I will consult Dr. Elder for admission.                               MDM    Final diagnoses:   Syncope and collapse   Generalized weakness   Diarrhea, unspecified type       Documentation assistance provided by sourav Jade.  Information recorded by the scribe was done at my direction and has been verified and validated by me.     Citlalli Jade  10/08/21 1134       Modesto Cowan DO  10/08/21 1531

## 2021-10-08 NOTE — ED NOTES
Orthostatics were performed on pt. However patient was too weak and dizzy to stand up.   RN made aware     Fabienne Ott  10/08/21 6834

## 2021-10-09 ENCOUNTER — READMISSION MANAGEMENT (OUTPATIENT)
Dept: CALL CENTER | Facility: HOSPITAL | Age: 76
End: 2021-10-09

## 2021-10-09 VITALS
SYSTOLIC BLOOD PRESSURE: 133 MMHG | WEIGHT: 190.48 LBS | DIASTOLIC BLOOD PRESSURE: 65 MMHG | HEIGHT: 62 IN | HEART RATE: 57 BPM | BODY MASS INDEX: 35.05 KG/M2 | TEMPERATURE: 98.1 F | RESPIRATION RATE: 18 BRPM | OXYGEN SATURATION: 97 %

## 2021-10-09 PROBLEM — R55 SYNCOPE AND COLLAPSE: Status: RESOLVED | Noted: 2021-10-08 | Resolved: 2021-10-09

## 2021-10-09 LAB
ANION GAP SERPL CALCULATED.3IONS-SCNC: 9.5 MMOL/L (ref 5–15)
BASOPHILS # BLD AUTO: 0.04 10*3/MM3 (ref 0–0.2)
BASOPHILS NFR BLD AUTO: 0.5 % (ref 0–1.5)
BUN SERPL-MCNC: 8 MG/DL (ref 8–23)
BUN/CREAT SERPL: 14.3 (ref 7–25)
CALCIUM SPEC-SCNC: 8.2 MG/DL (ref 8.6–10.5)
CHLORIDE SERPL-SCNC: 105 MMOL/L (ref 98–107)
CO2 SERPL-SCNC: 24.5 MMOL/L (ref 22–29)
CREAT SERPL-MCNC: 0.56 MG/DL (ref 0.57–1)
DEPRECATED RDW RBC AUTO: 46.2 FL (ref 37–54)
EOSINOPHIL # BLD AUTO: 0.14 10*3/MM3 (ref 0–0.4)
EOSINOPHIL NFR BLD AUTO: 1.9 % (ref 0.3–6.2)
ERYTHROCYTE [DISTWIDTH] IN BLOOD BY AUTOMATED COUNT: 13.8 % (ref 12.3–15.4)
GFR SERPL CREATININE-BSD FRML MDRD: 105 ML/MIN/1.73
GLUCOSE SERPL-MCNC: 107 MG/DL (ref 65–99)
HCT VFR BLD AUTO: 38.8 % (ref 34–46.6)
HGB BLD-MCNC: 12.8 G/DL (ref 12–15.9)
IMM GRANULOCYTES # BLD AUTO: 0.03 10*3/MM3 (ref 0–0.05)
IMM GRANULOCYTES NFR BLD AUTO: 0.4 % (ref 0–0.5)
LYMPHOCYTES # BLD AUTO: 1.26 10*3/MM3 (ref 0.7–3.1)
LYMPHOCYTES NFR BLD AUTO: 17.2 % (ref 19.6–45.3)
MCH RBC QN AUTO: 30 PG (ref 26.6–33)
MCHC RBC AUTO-ENTMCNC: 33 G/DL (ref 31.5–35.7)
MCV RBC AUTO: 91.1 FL (ref 79–97)
MONOCYTES # BLD AUTO: 0.79 10*3/MM3 (ref 0.1–0.9)
MONOCYTES NFR BLD AUTO: 10.8 % (ref 5–12)
NEUTROPHILS NFR BLD AUTO: 5.07 10*3/MM3 (ref 1.7–7)
NEUTROPHILS NFR BLD AUTO: 69.2 % (ref 42.7–76)
NRBC BLD AUTO-RTO: 0 /100 WBC (ref 0–0.2)
PLATELET # BLD AUTO: 214 10*3/MM3 (ref 140–450)
PMV BLD AUTO: 10.2 FL (ref 6–12)
POTASSIUM SERPL-SCNC: 3.8 MMOL/L (ref 3.5–5.2)
RBC # BLD AUTO: 4.26 10*6/MM3 (ref 3.77–5.28)
SARS-COV-2 N GENE RESP QL NAA+PROBE: NOT DETECTED
SODIUM SERPL-SCNC: 139 MMOL/L (ref 136–145)
WBC # BLD AUTO: 7.33 10*3/MM3 (ref 3.4–10.8)

## 2021-10-09 PROCEDURE — 80048 BASIC METABOLIC PNL TOTAL CA: CPT | Performed by: INTERNAL MEDICINE

## 2021-10-09 PROCEDURE — 99238 HOSP IP/OBS DSCHRG MGMT 30/<: CPT | Performed by: INTERNAL MEDICINE

## 2021-10-09 PROCEDURE — 85025 COMPLETE CBC W/AUTO DIFF WBC: CPT | Performed by: INTERNAL MEDICINE

## 2021-10-09 RX ADMIN — SODIUM CHLORIDE, PRESERVATIVE FREE 10 ML: 5 INJECTION INTRAVENOUS at 08:10

## 2021-10-09 RX ADMIN — METOPROLOL SUCCINATE 25 MG: 25 TABLET, EXTENDED RELEASE ORAL at 08:09

## 2021-10-09 RX ADMIN — SODIUM CHLORIDE 100 ML/HR: 9 INJECTION, SOLUTION INTRAVENOUS at 06:35

## 2021-10-09 RX ADMIN — LEVOTHYROXINE SODIUM 100 MCG: 0.1 TABLET ORAL at 06:35

## 2021-10-09 NOTE — PLAN OF CARE
Goal Outcome Evaluation:  Plan of Care Reviewed With: patient        Progress: improving  Outcome Summary: Patient with no syncopal episodes this shift.  medicated x1 for c/o headache. continue to monitor.

## 2021-10-09 NOTE — SIGNIFICANT NOTE
10/09/21 1015   Living Environment   Lives With alone   Current Living Arrangements home/apartment/condo   Primary Care Provided by self   Provides Primary Care For no one   Family Caregiver if Needed child(sanjuana), adult   Family Caregiver Names Leigh Ann Grayson- Daughter   Quality of Family Relationships supportive; helpful   Living Arrangement Comments Daughter reports that she lives 10 minutes from patient and is able to help her when needed.   Resource/Environmental Concerns   Resource/Environmental Concerns none   Transportation Concerns car, none   Transition Planning   Patient/Family Anticipates Transition to home   Patient/Family Anticipated Services at Transition none   Transportation Anticipated family or friend will provide   Discharge Needs Assessment   Readmission Within the Last 30 Days no previous admission in last 30 days   Equipment Currently Used at Home cane, straight   Concerns to be Addressed denies needs/concerns at this time   Concerns Comments Daughter reports that patient does have difficulty taking steps at times without having a cane in her hand. Pt does use cane when needed.   Anticipated Changes Related to Illness none   Equipment Needed After Discharge cane, straight   Discharge Coordination/Progress SW attempted to contact patient but no answer. SW contacted next person of contact, daughter. Pt is independent at home prior to admission. She has a supportive family who is able to help when needed. Daughter reports that patient is able to discharge back home if treatment plan supports this. Pt has not ever had HH or rehab in the past. [facility] but would need to talk with patient. Daughter is able to transport at discharge.

## 2021-10-09 NOTE — OUTREACH NOTE
Prep Survey      Responses   [facility] facility patient discharged from? [facility]   Is LACE score < 7 ? Yes   Emergency Room discharge w/ pulse ox? No   Eligibility [facility]   Date of Admission 10/08/21   Date of Discharge 10/09/21   Discharge Disposition Home or Self Care   Discharge diagnosis Syncope/Collapse   Does the patient have one of the following disease processes/diagnoses(primary or secondary)? Other   Does the patient have Home health ordered? No   Is there a DME ordered? No   Prep survey completed? Yes          Mona Stewart RN

## 2021-10-09 NOTE — DISCHARGE SUMMARY
[facility]         DISCHARGE SUMMARY    Patient Name: Kelsey Durand  : 1945  MRN: [id number]    Date of Admission: 10/8/2021  Date of Discharge:     Primary Care Physician: Chris Elder MD    Consults     Date and Time Order Name Status Description    10/8/2021  2:40 PM Internal Medicine (on-call MD unless specified) Completed           Presenting Problem:   Syncope and collapse [R55]  Generalized weakness [R53.1]  Diarrhea, unspecified type [R19.7]    Active and Resolved Hospital Problems:  Active Hospital Problems   No active problems to display.      Resolved Hospital Problems    Diagnosis POA   • Syncope and collapse [R55] Yes         Hospital Course     Hospital Course:    1.  Syncope.  Patient has had recent gastrointestinal illness with decreased p.o. intake and diarrhea.  She is somewhat prerenal at this time, so hopefully that is the etiology of her symptoms.  We will have her on telemetry as a precaution.---> Patient was stable overnight, there was no significant arrhythmia, she had an episode of bradycardia down to 56 only.  She been up and ambulating in the room without any difficulties.  The weakness she was suffering in the ER has since resolved.  The patient had no loss of bowel or bladder control there was no significant confusion following the event, so it does not appear to have been a seizure.  No obvious cardiac etiology ascertained this admission.  The patient was ill prior to this as noted with diarrhea and generalized weakness, so is most likely this was a orthostatic type event.  Patient appears stable for discharge home at this time with close outpatient follow-up.     2.  Dehydration.  Patient was bolused in the ER, will have her on normal saline at 100 on the floor as well.--->  Follow-up labs in AM 10/9 are improved and within normal limits.  Patient is tolerating p.o. diet just fine, will DC IV fluids and DC her home.     3.  Hypothyroidism.  Continue  home dosing of Synthroid.     4.  DVT prophylaxis.  Via Lovenox.     5.  PUD prophylaxis.  Via H2 blocker    Day of Discharge     Vital Signs:  Temp:  [98.1 °F (36.7 °C)-99.5 °F (37.5 °C)] 98.1 °F (36.7 °C)  Heart Rate:  [57-84] 57  Resp:  [16-20] 18  BP: (133-163)/(60-78) 133/65    Pertinent  and/or Most Recent Results     LAB RESULTS:      Lab 10/09/21  0634 10/08/21  1053   WBC 7.33 11.16*   HEMOGLOBIN 12.8 14.0   HEMATOCRIT 38.8 43.0   PLATELETS 214 241   NEUTROS ABS 5.07 9.38*   IMMATURE GRANS (ABS) 0.03 0.06*   LYMPHS ABS 1.26 0.69*   MONOS ABS 0.79 1.00*   EOS ABS 0.14 0.01   MCV 91.1 91.1         Lab 10/09/21  0634 10/08/21  1053   SODIUM 139 135*   POTASSIUM 3.8 4.1   CHLORIDE 105 98   CO2 24.5 25.8   ANION GAP 9.5 11.2   BUN 8 10   CREATININE 0.56* 0.75   GLUCOSE 107* 160*   CALCIUM 8.2* 9.0         Lab 10/08/21  1053   TOTAL PROTEIN 7.3   ALBUMIN 3.80   GLOBULIN 3.5   ALT (SGPT) 13   AST (SGOT) 17   BILIRUBIN 1.3*   ALK PHOS 56   LIPASE 35         Lab 10/08/21  1053   TROPONIN T <0.010                 Brief Urine Lab Results  (Last result in the past 365 days)      Color   Clarity   Blood   Leuk Est   Nitrite   Protein   CREAT   Urine HCG        01/02/21 1621   CLEAR   NEGATIVE   TRACE  Comment:  URINE MICROSCOPICS:    Only performed if any of the following are present:    Appearance is Sl Cloudy to Turbid; Protein is    30 to >/= 300 mg/dL; Occult Blood, Nitrite, or Leukocyte    Esterase is positive. Color is Red or Orange.     NEGATIVE                 Microbiology Results (last 10 days)     Procedure Component Value - Date/Time    COVID PRE-OP / PRE-PROCEDURE SCREENING ORDER (NO ISOLATION) - Swab, Nasopharynx [[id number]]  (Normal) Collected: 10/08/21 1920    Lab Status: Final result Specimen: Swab from Nasopharynx Updated: 10/09/21 0016    Narrative:      The following orders were created for panel order COVID PRE-OP / PRE-PROCEDURE SCREENING ORDER (NO ISOLATION) - Swab, Nasopharynx.  Procedure                                Abnormality         Status                     ---------                               -----------         ------                     COVID-19,CEPHEID/YOSI/BD...[[id number]]  Normal              Final result                 Please view results for these tests on the individual orders.    COVID-19,CEPHEID/YOSI/BDMAX,COR/TERE/PAD/[facility] IN-HOUSE(OR EMERGENT/ADD-ON),NP SWAB IN TRANSPORT MEDIA 3-4 HR TAT, RT-PCR - Swab, Nasopharynx [[id number]]  (Normal) Collected: 10/08/21 1920    Lab Status: Final result Specimen: Swab from Nasopharynx Updated: 10/09/21 0016     COVID19 Not Detected    Narrative:      Fact sheet for providers: https://www.fda.gov/media/668511/download     Fact sheet for patients: https://www.fda.gov/media/851544/download  Presumptive Positive: additional testing may be indicated if it is necessary to differentiate between SARS-CoV-2 and other Sarbecovirus, for epidemiological purposes, or clinical management.                           Labs Pending at Discharge:        Discharge Details        Discharge Medications      Continue These Medications      Instructions Start Date   fluticasone 50 MCG/ACT nasal spray  Commonly known as: FLONASE   2 sprays, Each Nare, As Needed      levothyroxine 100 MCG tablet  Commonly known as: SYNTHROID, LEVOTHROID   100 mcg, Oral, Every Morning      Lexapro 5 MG tablet  Generic drug: escitalopram   5 mg, Oral, Daily      metoprolol succinate XL 25 MG 24 hr tablet  Commonly known as: TOPROL-XL   25 mg, Oral, Daily      psyllium 0.52 g capsule  Commonly known as: METAMUCIL   0.52 g, Oral, Daily      simvastatin 10 MG tablet  Commonly known as: ZOCOR   10 mg, Oral, Every Other Day      Tylenol 325 MG tablet  Generic drug: acetaminophen   325 mg, Oral, Daily PRN             Allergies   Allergen Reactions   • Sulfa Antibiotics Rash         Discharge Disposition:  Home or Self Care    Diet:  Diet Instructions     Diet: Regular      Discharge Diet: Regular             Discharge Activity:   Activity Instructions     Gradually Increase Activity Until at Pre-Hospitalization Level              CODE STATUS:  Code Status and Medical Interventions:   Ordered at: 10/08/21 1537     Code Status:    CPR     Medical Interventions (Level of Support Prior to Arrest):    Full         Future Appointments   Date Time Provider Department Center   10/28/2021  1:45 PM Chris Elder MD [facility]       Additional Instructions for the Follow-ups that You Need to Schedule     Discharge Follow-up with PCP   As directed       Currently Documented PCP:    Chris Elder MD    PCP Phone Number:    [phone]     Follow Up Details: Dr. Elder in 10 days.               Time spent on Discharge including face to face service: 20 minutes    Electronically signed by Chris Elder MD, 10/09/21, 12:57 PM EDT.

## 2021-10-11 ENCOUNTER — TRANSITIONAL CARE MANAGEMENT TELEPHONE ENCOUNTER (OUTPATIENT)
Dept: CALL CENTER | Facility: HOSPITAL | Age: 76
End: 2021-10-11

## 2021-10-11 NOTE — OUTREACH NOTE
Call Center TCM Note      Responses   [facility] patient discharged from? [facility]   Does the patient have one of the following disease processes/diagnoses(primary or secondary)? Other   TCM attempt successful? Yes   Call start time 1146   Call end time 1201   Discharge diagnosis Syncope/Collapse   Meds reviewed with patient/caregiver? Yes   Is the patient having any side effects they believe may be caused by any medication additions or changes? No   Does the patient have all medications ordered at discharge? N/A   Is the patient taking all medications as directed (includes completed medication regime)? Yes   Medication comments Patient wants to talk about Lexapro as she feels as if it causing her bad dreams   Does the patient have a primary care provider?  Yes   Does the patient have an appointment with their PCP within 7 days of discharge? Greater than 7 days   Comments regarding PCP Hospital PCP FOLLOW UP APPOINTMENT IS 10/18/21@1045am   Nursing Interventions --  [Assisted with scheduling TCM appt]   Has the patient kept scheduled appointments due by today? N/A   Has home health visited the patient within 72 hours of discharge? N/A   Psychosocial issues? No   Did the patient receive a copy of their discharge instructions? Yes   Nursing interventions Reviewed instructions with patient   What is the patient's perception of their health status since discharge? Improving  [Pt feels much improved--she is sleepy this morning but otherwise good. She has been out shopping a few times since discharge and has had no syncopal episodes.  She did report an episode of urinary frequency earlier which lead to incontinence.]   Is the patient/caregiver able to teach back signs and symptoms related to disease process for when to call PCP? Yes   Is the patient/caregiver able to teach back signs and symptoms related to disease process for when to call 911? Yes   If the patient is a current smoker, are they able to teach back  resources for cessation? Not a smoker   Additional teach back comments Rev'd s/s of UTI w/pt--.  Pt also reported she received a flu vaccination last week at Pharmacy and wants to talk about her Pneumonia vaccine at her f/u appt.   TCM call completed? Yes          Madai Boykin RN    10/11/2021, 12:02 EDT

## 2021-10-12 ENCOUNTER — LAB (OUTPATIENT)
Dept: LAB | Facility: HOSPITAL | Age: 76
End: 2021-10-12

## 2021-10-12 ENCOUNTER — TRANSCRIBE ORDERS (OUTPATIENT)
Dept: INTERNAL MEDICINE | Facility: CLINIC | Age: 76
End: 2021-10-12

## 2021-10-12 DIAGNOSIS — E03.4 IDIOPATHIC ATROPHIC HYPOTHYROIDISM: ICD-10-CM

## 2021-10-12 DIAGNOSIS — R73.01 IMPAIRED FASTING GLUCOSE: ICD-10-CM

## 2021-10-12 DIAGNOSIS — E78.2 MIXED HYPERLIPIDEMIA: Primary | ICD-10-CM

## 2021-10-12 DIAGNOSIS — E78.2 MIXED HYPERLIPIDEMIA: ICD-10-CM

## 2021-10-12 DIAGNOSIS — G93.32 CHRONIC FATIGUE SYNDROME: ICD-10-CM

## 2021-10-12 DIAGNOSIS — E55.9 VITAMIN D DEFICIENCY DISEASE: ICD-10-CM

## 2021-10-12 LAB
25(OH)D3 SERPL-MCNC: 26.5 NG/ML
ALBUMIN SERPL-MCNC: 3.8 G/DL (ref 3.5–5.2)
ALBUMIN/GLOB SERPL: 1.2 G/DL
ALP SERPL-CCNC: 47 U/L (ref 39–117)
ALT SERPL W P-5'-P-CCNC: 23 U/L (ref 1–33)
ANION GAP SERPL CALCULATED.3IONS-SCNC: 7.7 MMOL/L (ref 5–15)
AST SERPL-CCNC: 23 U/L (ref 1–32)
BASOPHILS # BLD AUTO: 0.08 10*3/MM3 (ref 0–0.2)
BASOPHILS NFR BLD AUTO: 1.4 % (ref 0–1.5)
BILIRUB SERPL-MCNC: 0.5 MG/DL (ref 0–1.2)
BUN SERPL-MCNC: 11 MG/DL (ref 8–23)
BUN/CREAT SERPL: 17.7 (ref 7–25)
CALCIUM SPEC-SCNC: 8.8 MG/DL (ref 8.6–10.5)
CHLORIDE SERPL-SCNC: 104 MMOL/L (ref 98–107)
CHOLEST SERPL-MCNC: 145 MG/DL (ref 0–200)
CO2 SERPL-SCNC: 27.3 MMOL/L (ref 22–29)
CREAT SERPL-MCNC: 0.62 MG/DL (ref 0.57–1)
DEPRECATED RDW RBC AUTO: 45 FL (ref 37–54)
EOSINOPHIL # BLD AUTO: 0.29 10*3/MM3 (ref 0–0.4)
EOSINOPHIL NFR BLD AUTO: 5 % (ref 0.3–6.2)
ERYTHROCYTE [DISTWIDTH] IN BLOOD BY AUTOMATED COUNT: 13.1 % (ref 12.3–15.4)
GFR SERPL CREATININE-BSD FRML MDRD: 93 ML/MIN/1.73
GLOBULIN UR ELPH-MCNC: 3.1 GM/DL
GLUCOSE SERPL-MCNC: 93 MG/DL (ref 65–99)
HBA1C MFR BLD: 6.45 % (ref 4.8–5.6)
HCT VFR BLD AUTO: 39.2 % (ref 34–46.6)
HDLC SERPL-MCNC: 49 MG/DL (ref 40–60)
HGB BLD-MCNC: 12.4 G/DL (ref 12–15.9)
IMM GRANULOCYTES # BLD AUTO: 0.04 10*3/MM3 (ref 0–0.05)
IMM GRANULOCYTES NFR BLD AUTO: 0.7 % (ref 0–0.5)
LDLC SERPL CALC-MCNC: 79 MG/DL (ref 0–100)
LDLC/HDLC SERPL: 1.59 {RATIO}
LYMPHOCYTES # BLD AUTO: 1.32 10*3/MM3 (ref 0.7–3.1)
LYMPHOCYTES NFR BLD AUTO: 22.9 % (ref 19.6–45.3)
MCH RBC QN AUTO: 29.7 PG (ref 26.6–33)
MCHC RBC AUTO-ENTMCNC: 31.6 G/DL (ref 31.5–35.7)
MCV RBC AUTO: 93.8 FL (ref 79–97)
MONOCYTES # BLD AUTO: 0.63 10*3/MM3 (ref 0.1–0.9)
MONOCYTES NFR BLD AUTO: 10.9 % (ref 5–12)
NEUTROPHILS NFR BLD AUTO: 3.4 10*3/MM3 (ref 1.7–7)
NEUTROPHILS NFR BLD AUTO: 59.1 % (ref 42.7–76)
NRBC BLD AUTO-RTO: 0 /100 WBC (ref 0–0.2)
PLATELET # BLD AUTO: 296 10*3/MM3 (ref 140–450)
PMV BLD AUTO: 10.4 FL (ref 6–12)
POTASSIUM SERPL-SCNC: 4.1 MMOL/L (ref 3.5–5.2)
PROT SERPL-MCNC: 6.9 G/DL (ref 6–8.5)
RBC # BLD AUTO: 4.18 10*6/MM3 (ref 3.77–5.28)
SODIUM SERPL-SCNC: 139 MMOL/L (ref 136–145)
TRIGL SERPL-MCNC: 90 MG/DL (ref 0–150)
TSH SERPL DL<=0.05 MIU/L-ACNC: 2.31 UIU/ML (ref 0.27–4.2)
VLDLC SERPL-MCNC: 17 MG/DL (ref 5–40)
WBC # BLD AUTO: 5.76 10*3/MM3 (ref 3.4–10.8)

## 2021-10-12 PROCEDURE — 80061 LIPID PANEL: CPT

## 2021-10-12 PROCEDURE — 85025 COMPLETE CBC W/AUTO DIFF WBC: CPT

## 2021-10-12 PROCEDURE — 80053 COMPREHEN METABOLIC PANEL: CPT

## 2021-10-12 PROCEDURE — 83036 HEMOGLOBIN GLYCOSYLATED A1C: CPT

## 2021-10-12 PROCEDURE — 82306 VITAMIN D 25 HYDROXY: CPT

## 2021-10-12 PROCEDURE — 36415 COLL VENOUS BLD VENIPUNCTURE: CPT

## 2021-10-12 PROCEDURE — 84443 ASSAY THYROID STIM HORMONE: CPT

## 2021-10-16 PROBLEM — F41.1 GAD (GENERALIZED ANXIETY DISORDER): Status: ACTIVE | Noted: 2021-10-16

## 2021-10-16 PROBLEM — E78.2 MIXED HYPERLIPIDEMIA: Status: ACTIVE | Noted: 2021-10-16

## 2021-10-16 PROBLEM — E11.9 TYPE 2 DIABETES MELLITUS WITHOUT COMPLICATION, WITHOUT LONG-TERM CURRENT USE OF INSULIN: Status: ACTIVE | Noted: 2021-10-16

## 2021-10-16 PROBLEM — E11.9 DIABETES: Status: ACTIVE | Noted: 2021-10-16

## 2021-10-16 PROBLEM — M15.9 PRIMARY OSTEOARTHRITIS INVOLVING MULTIPLE JOINTS: Status: ACTIVE | Noted: 2021-10-16

## 2021-10-16 PROBLEM — M15.0 PRIMARY OSTEOARTHRITIS INVOLVING MULTIPLE JOINTS: Status: ACTIVE | Noted: 2021-10-16

## 2021-10-16 PROBLEM — M65.30 TRIGGERING OF DIGIT: Status: ACTIVE | Noted: 2018-05-10

## 2021-10-16 NOTE — PROGRESS NOTES
"Chief Complaint  Hospital Follow Up Visit    Subjective          Kelsey Durand presents to [facility]     History of Present Illness    Patient pleasant 76-year-old male with underlying hyperlipidemia, hypothyroidism, IFG versus diabetes, among others, who is coming in for a transitional care visit as well as labs done for her routine 6-month follow-up.  She was hospitalized for episode of syncope at [facility] on the following days:  Date of Admission: 10/8/2021  Date of Discharge: 10/9/2021.  Her hospital discharge summary was reviewed, and the pertinent details are included below.  Additionally we will review her labs and make further recommendations at that time.    Review of Systems   Constitutional: Negative for appetite change, fatigue and fever.   HENT: Negative for congestion and ear pain.    Eyes: Negative for blurred vision.   Respiratory: Negative for cough, chest tightness, shortness of breath and wheezing.    Cardiovascular: Negative for chest pain, palpitations and leg swelling.   Gastrointestinal: Negative for abdominal pain.   Genitourinary: Negative for difficulty urinating, dysuria and hematuria.   Musculoskeletal: Negative for arthralgias and gait problem.   Skin: Negative for skin lesions.   Neurological: Negative for syncope, memory problem and confusion.   Psychiatric/Behavioral: Negative for self-injury and depressed mood.       Objective   Vital Signs:   /92 (BP Location: Left arm, Patient Position: Sitting, Cuff Size: Adult)   Pulse 61   Temp 97.2 °F (36.2 °C) (Tympanic)   Resp 18   Ht 157.5 cm (62\")   Wt 84.4 kg (186 lb)   SpO2 96% Comment: room air  BMI 34.02 kg/m²           Physical Exam  Vitals and nursing note reviewed.   Constitutional:       General: She is not in acute distress.     Appearance: Normal appearance. She is not toxic-appearing.   HENT:      Head: Atraumatic.      Right Ear: External ear normal.      Left Ear: External ear " normal.      Nose: Nose normal.      Mouth/Throat:      Mouth: Mucous membranes are moist.   Eyes:      General:         Right eye: No discharge.         Left eye: No discharge.      Extraocular Movements: Extraocular movements intact.      Pupils: Pupils are equal, round, and reactive to light.   Cardiovascular:      Rate and Rhythm: Normal rate and regular rhythm.      Pulses: Normal pulses.      Heart sounds: Normal heart sounds. No murmur heard.  No gallop.    Pulmonary:      Effort: Pulmonary effort is normal. No respiratory distress.      Breath sounds: No wheezing, rhonchi or rales.   Abdominal:      General: There is no distension.      Palpations: Abdomen is soft. There is no mass.      Tenderness: There is no abdominal tenderness. There is no guarding.   Musculoskeletal:         General: No swelling or tenderness.      Cervical back: No tenderness.      Right lower leg: No edema.      Left lower leg: No edema.   Skin:     General: Skin is warm and dry.      Findings: No rash.   Neurological:      General: No focal deficit present.      Mental Status: She is alert and oriented to person, place, and time. Mental status is at baseline.      Motor: No weakness.      Gait: Gait normal.   Psychiatric:         Mood and Affect: Mood normal.         Thought Content: Thought content normal.          Result Review :   The following data was reviewed by: Chris Elder MD on 10/18/2021:                  Assessment and Plan    Diagnoses and all orders for this visit:    1. Acquired atrophy of thyroid (Primary)  Overview:  TSH is 2.3 as of 10/21 office visit.  Patient is stable on Synthroid 100 mcg daily, continue same.      2. Mixed hyperlipidemia  Overview:  LDL of 79 is at goal as of her 10/21 office visit.  Patient is stable on very low-dose simvastatin, continue same.      3. Primary osteoarthritis involving multiple joints  Overview:  This appears to be reasonably controlled with over-the-counter Tylenol.  There  are many days that she does not even need to use any.  Continue same treatment.      4. Type 2 diabetes mellitus without complication, without long-term current use of insulin ([facility])  Overview:  A1c is up from 6.0 to 6.5 as of 10/21 office visit.  Patient still does not require any medication, but with need to keep a closer eye on this.  She is aware of dietary issues to address.    Orders:  -     Hemoglobin A1c; Future  -     Basic Metabolic Panel; Future    5. Vitamin D deficiency  Overview:  Vitamin D is 26 as of 10/21 office visit.  I discussed with patient to get on 2000 units over-the-counter, we will repeat this on return to office.    Orders:  -     Vitamin D 1,25 Dihydroxy; Future    6. JANELLE (generalized anxiety disorder)  Overview:  Patient stable on maintenance SSRI, will refill at this office visit as of 10/21.      7. Hospital discharge follow-up  Overview:  Patient is being seen for transitional care visit as of 10/18/2021.  She is certainly very stable, her dehydration was corrected in the hospital, and she has been eating and drinking well since discharge.  The summary was reviewed with the pertinent details as noted below:    Hospital Course:     1.  Syncope.  Patient has had recent gastrointestinal illness with decreased p.o. intake and diarrhea.  She is somewhat prerenal at this time, so hopefully that is the etiology of her symptoms.  We will have her on telemetry as a precaution.---> Patient was stable overnight, there was no significant arrhythmia, she had an episode of bradycardia down to 56 only.  She been up and ambulating in the room without any difficulties.  The weakness she was suffering in the ER has since resolved.  The patient had no loss of bowel or bladder control there was no significant confusion following the event, so it does not appear to have been a seizure.  No obvious cardiac etiology ascertained this admission.  The patient was ill prior to this as noted with diarrhea and  "generalized weakness, so is most likely this was a orthostatic type event.  Patient appears stable for discharge home at this time with close outpatient follow-up.     2.  Dehydration.  Patient was bolused in the ER, will have her on normal saline at 100 on the floor as well.--->  Follow-up labs in AM 10/9 are improved and within normal limits.  Patient is tolerating p.o. diet just fine, will DC IV fluids and DC her home.      Other orders  -     escitalopram (Lexapro) 5 MG tablet; Take 1 tablet by mouth Daily.  Dispense: 90 tablet; Refill: 1  -     simvastatin (ZOCOR) 10 MG tablet; Take 1 tablet by mouth Every Other Day.  Dispense: 90 tablet; Refill: 1  -     metoprolol succinate XL (TOPROL-XL) 25 MG 24 hr tablet; Take 1 tablet by mouth Daily.  Dispense: 90 tablet; Refill: 1  -     levothyroxine (SYNTHROID, LEVOTHROID) 100 MCG tablet; Take 1 tablet by mouth Every Morning.  Dispense: 90 tablet; Refill: 1     --  --  OLDER NOTES:  VISIT 4/21:  ANNUAL MEDICARE WELLNESS PHYSICAL 4/19 = reviewed all forms in office with pt; no new discoveries; active at mall=class and walks/ bike at home.  H.M. ISSUES:  BMI 30 and d/w watch carbs and walk.  --  \"HYPERTENSION\" controlled=better at home='s (off meds good while)---> d/w check occ and let us know=fine at home as of 4/21.    LIPIDS at goal with LDL 68...101 so f/u before change...93 fine---> 80 stable 4/21.  --  HYPOTHYROIDISM stable 10/16 with TSH 0.2 still=on same dose many years---> fine 4/21.  IFG mild/stable = FBS 92 with A1C 6.0---> 6.3 in 4/20---> 6.0 in 4/21.  --  ANXIETY D/O 1/19 and has benzo to use PRN; d/w call if feels she needs to use it too often and will add SSRI...cheerful 10/19...will use low dose lexapro as of 4/20 OV---> seems better 10/20.  B12 LGO=084 in 4/20.  --  SYNCOPE is ? seizure per Dr Valadez/Flash in [facility] over the weekend, so will get EEG (tongue bite/incontinent)...EEG was neg 9/15.  --  BMD 5/17 = spine 0.9, hip 0.6 is great---> BMD 7/20 " = spine 1.0, hip -0.3 = great!!  VIT D DEF remains stable on 2000 qd...28 and d/w take routinely please...44---> same 10/20.  --  DJD no new c/o.  R KNEE PAIN with no trauma, comes and goes, exam is w/o laxity, so agree with repeat mobic and use brace and then PTA if no better.  --  A.R. no flare.  --  --  MMG 4/29/21 per Dr Lawler/Fabiola Pacheco.  COLON 9/14...polyp was hyperplastic, so defer f/u.  Adacel 3/14, Prevnar '16; Pneumovax #1 10/17; Hep A x2 10/18; d/w Shingrix 10/18; COVID x2 as of 4/21 with Moderna and I discussed with her she will be able to get the booster as soon as they get ready.  ( Samuel 7/13, 2 girls here=Leigh Ann (no kids) and Xiao=youngest is boy in 6th grade 10/20 and girl with Asperger's is Jr at [place]).    Follow Up   Return in about 4 months (around 2/18/2022).  Patient was given instructions and counseling regarding her condition or for health maintenance advice. Please see specific information pulled into the AVS if appropriate.

## 2021-10-18 ENCOUNTER — OFFICE VISIT (OUTPATIENT)
Dept: INTERNAL MEDICINE | Facility: CLINIC | Age: 76
End: 2021-10-18

## 2021-10-18 VITALS
HEIGHT: 62 IN | RESPIRATION RATE: 18 BRPM | OXYGEN SATURATION: 96 % | WEIGHT: 186 LBS | TEMPERATURE: 97.2 F | BODY MASS INDEX: 34.23 KG/M2 | SYSTOLIC BLOOD PRESSURE: 157 MMHG | HEART RATE: 61 BPM | DIASTOLIC BLOOD PRESSURE: 92 MMHG

## 2021-10-18 DIAGNOSIS — E78.2 MIXED HYPERLIPIDEMIA: ICD-10-CM

## 2021-10-18 DIAGNOSIS — F41.1 GAD (GENERALIZED ANXIETY DISORDER): ICD-10-CM

## 2021-10-18 DIAGNOSIS — E03.4 ACQUIRED ATROPHY OF THYROID: Primary | ICD-10-CM

## 2021-10-18 DIAGNOSIS — Z09 HOSPITAL DISCHARGE FOLLOW-UP: ICD-10-CM

## 2021-10-18 DIAGNOSIS — E11.9 TYPE 2 DIABETES MELLITUS WITHOUT COMPLICATION, WITHOUT LONG-TERM CURRENT USE OF INSULIN (HCC): ICD-10-CM

## 2021-10-18 DIAGNOSIS — M15.9 PRIMARY OSTEOARTHRITIS INVOLVING MULTIPLE JOINTS: ICD-10-CM

## 2021-10-18 DIAGNOSIS — E55.9 VITAMIN D DEFICIENCY: ICD-10-CM

## 2021-10-18 PROCEDURE — 99495 TRANSJ CARE MGMT MOD F2F 14D: CPT | Performed by: INTERNAL MEDICINE

## 2021-10-18 PROCEDURE — 1111F DSCHRG MED/CURRENT MED MERGE: CPT | Performed by: INTERNAL MEDICINE

## 2021-10-18 RX ORDER — LEVOTHYROXINE SODIUM 0.1 MG/1
100 TABLET ORAL EVERY MORNING
Qty: 90 TABLET | Refills: 1 | Status: SHIPPED | OUTPATIENT
Start: 2021-10-18 | End: 2022-02-21 | Stop reason: SDUPTHER

## 2021-10-18 RX ORDER — SIMVASTATIN 10 MG
10 TABLET ORAL EVERY OTHER DAY
Qty: 90 TABLET | Refills: 1 | Status: SHIPPED | OUTPATIENT
Start: 2021-10-18 | End: 2022-04-05 | Stop reason: SDUPTHER

## 2021-10-18 RX ORDER — ESCITALOPRAM OXALATE 5 MG/1
5 TABLET ORAL DAILY
Qty: 90 TABLET | Refills: 1 | Status: SHIPPED | OUTPATIENT
Start: 2021-10-18 | End: 2022-02-21 | Stop reason: SDUPTHER

## 2021-10-18 RX ORDER — METOPROLOL SUCCINATE 25 MG/1
25 TABLET, EXTENDED RELEASE ORAL DAILY
Qty: 90 TABLET | Refills: 1 | Status: SHIPPED | OUTPATIENT
Start: 2021-10-18 | End: 2021-11-29 | Stop reason: SDUPTHER

## 2021-10-29 LAB — QT INTERVAL: 410 MS

## 2021-11-29 RX ORDER — METOPROLOL SUCCINATE 25 MG/1
25 TABLET, EXTENDED RELEASE ORAL DAILY
Qty: 90 TABLET | Refills: 1 | Status: SHIPPED | OUTPATIENT
Start: 2021-11-29 | End: 2022-05-24 | Stop reason: SDUPTHER

## 2021-12-03 ENCOUNTER — TELEPHONE (OUTPATIENT)
Dept: INTERNAL MEDICINE | Facility: CLINIC | Age: 76
End: 2021-12-03

## 2021-12-03 RX ORDER — CEFDINIR 300 MG/1
300 CAPSULE ORAL 2 TIMES DAILY
Qty: 20 CAPSULE | Refills: 0 | Status: SHIPPED | OUTPATIENT
Start: 2021-12-03 | End: 2022-02-10

## 2021-12-03 NOTE — TELEPHONE ENCOUNTER
Pt has been taking Mucinex 1-2 times a day for the past 2 weeks, no fever, she has drainage and head and chest congestion, didn't know if you would prescribe a Z-terrell or Medrol Dose Terrell?

## 2021-12-23 ENCOUNTER — TRANSCRIBE ORDERS (OUTPATIENT)
Dept: ADMINISTRATIVE | Facility: HOSPITAL | Age: 76
End: 2021-12-23

## 2021-12-23 DIAGNOSIS — Z12.31 ENCOUNTER FOR SCREENING MAMMOGRAM FOR MALIGNANT NEOPLASM OF BREAST: Primary | ICD-10-CM

## 2022-02-01 ENCOUNTER — LAB (OUTPATIENT)
Dept: LAB | Facility: HOSPITAL | Age: 77
End: 2022-02-01

## 2022-02-01 DIAGNOSIS — E11.9 TYPE 2 DIABETES MELLITUS WITHOUT COMPLICATION, WITHOUT LONG-TERM CURRENT USE OF INSULIN: ICD-10-CM

## 2022-02-01 DIAGNOSIS — E55.9 VITAMIN D DEFICIENCY: ICD-10-CM

## 2022-02-01 LAB
ANION GAP SERPL CALCULATED.3IONS-SCNC: 6 MMOL/L (ref 5–15)
BUN SERPL-MCNC: 11 MG/DL (ref 8–23)
BUN/CREAT SERPL: 15.7 (ref 7–25)
CALCIUM SPEC-SCNC: 9.6 MG/DL (ref 8.6–10.5)
CHLORIDE SERPL-SCNC: 100 MMOL/L (ref 98–107)
CO2 SERPL-SCNC: 30 MMOL/L (ref 22–29)
CREAT SERPL-MCNC: 0.7 MG/DL (ref 0.57–1)
GFR SERPL CREATININE-BSD FRML MDRD: 81 ML/MIN/1.73
GLUCOSE SERPL-MCNC: 87 MG/DL (ref 65–99)
HBA1C MFR BLD: 6.51 % (ref 4.8–5.6)
POTASSIUM SERPL-SCNC: 4.5 MMOL/L (ref 3.5–5.2)
SODIUM SERPL-SCNC: 136 MMOL/L (ref 136–145)

## 2022-02-01 PROCEDURE — 36415 COLL VENOUS BLD VENIPUNCTURE: CPT

## 2022-02-01 PROCEDURE — 80048 BASIC METABOLIC PNL TOTAL CA: CPT

## 2022-02-01 PROCEDURE — 83036 HEMOGLOBIN GLYCOSYLATED A1C: CPT

## 2022-02-01 PROCEDURE — 82652 VIT D 1 25-DIHYDROXY: CPT

## 2022-02-04 LAB — 1,25(OH)2D SERPL-MCNC: 33.7 PG/ML (ref 19.9–79.3)

## 2022-02-09 ENCOUNTER — TELEPHONE (OUTPATIENT)
Dept: INTERNAL MEDICINE | Facility: CLINIC | Age: 77
End: 2022-02-09

## 2022-02-09 NOTE — TELEPHONE ENCOUNTER
Called patient because she is due for medicare wellness visit. Was calling to see if she was ok to come and be seen by RAFAEL Garcia for this care gap.    Ok for hub to read and schedule with Bhakti Garcia.

## 2022-02-10 ENCOUNTER — OFFICE VISIT (OUTPATIENT)
Dept: INTERNAL MEDICINE | Facility: CLINIC | Age: 77
End: 2022-02-10

## 2022-02-10 VITALS
OXYGEN SATURATION: 97 % | WEIGHT: 182 LBS | DIASTOLIC BLOOD PRESSURE: 70 MMHG | RESPIRATION RATE: 20 BRPM | HEART RATE: 68 BPM | BODY MASS INDEX: 33.49 KG/M2 | TEMPERATURE: 97.7 F | HEIGHT: 62 IN | SYSTOLIC BLOOD PRESSURE: 129 MMHG

## 2022-02-10 DIAGNOSIS — Z00.00 ENCOUNTER FOR MEDICARE ANNUAL WELLNESS EXAM: Primary | ICD-10-CM

## 2022-02-10 DIAGNOSIS — Z11.59 NEED FOR HEPATITIS C SCREENING TEST: ICD-10-CM

## 2022-02-10 PROCEDURE — G0439 PPPS, SUBSEQ VISIT: HCPCS

## 2022-02-10 PROCEDURE — 1159F MED LIST DOCD IN RCRD: CPT

## 2022-02-10 PROCEDURE — 1170F FXNL STATUS ASSESSED: CPT

## 2022-02-10 NOTE — PROGRESS NOTES
The ABCs of the Annual Wellness Visit  Subsequent Medicare Wellness Visit    Chief Complaint   Patient presents with   • Medicare Wellness-subsequent      Subjective    History of Present Illness:  Kelsey Durand is a 76 y.o. female who presents for a Subsequent Medicare Wellness Visit.    The following portions of the patient's history were reviewed and   updated as appropriate: allergies, current medications, past family history, past medical history, past social history, past surgical history and problem list.    Compared to one year ago, the patient feels her physical   health is the same.    Compared to one year ago, the patient feels her mental   health is the same.    Recent Hospitalizations:  This patient has had a [facility] admission record on file within the last 365 days.    Current Medical Providers:  Patient Care Team:  Chris Elder MD as PCP - General (Internal Medicine)    Outpatient Medications Prior to Visit   Medication Sig Dispense Refill   • acetaminophen (Tylenol) 325 MG tablet Take 325 mg by mouth Daily As Needed.     • escitalopram (Lexapro) 5 MG tablet Take 1 tablet by mouth Daily. 90 tablet 1   • fluticasone (FLONASE) 50 MCG/ACT nasal spray 2 sprays by Each Nare route As Needed.     • levothyroxine (SYNTHROID, LEVOTHROID) 100 MCG tablet Take 1 tablet by mouth Every Morning. 90 tablet 1   • metoprolol succinate XL (TOPROL-XL) 25 MG 24 hr tablet Take 1 tablet by mouth Daily. 90 tablet 1   • psyllium (METAMUCIL) 0.52 g capsule Take 0.52 g by mouth Daily.     • simvastatin (ZOCOR) 10 MG tablet Take 1 tablet by mouth Every Other Day. 90 tablet 1   • cefdinir (OMNICEF) 300 MG capsule Take 1 capsule by mouth 2 (Two) Times a Day. 20 capsule 0     No facility-administered medications prior to visit.       No opioid medication identified on active medication list. I have reviewed chart for other potential  high risk medication/s and harmful drug interactions in the  "elderly.          Aspirin is not on active medication list.  Aspirin use is not indicated based on review of current medical condition/s. Risk of harm outweighs potential benefits.  .    Patient Active Problem List   Diagnosis   • Acquired atrophy of thyroid   • Allergic rhinitis due to pollen   • Mixed hyperlipidemia   • Vitamin D deficiency   • Triggering of digit   • Type 2 diabetes mellitus without complication, without long-term current use of insulin (HCC)   • Primary osteoarthritis involving multiple joints   • JANELLE (generalized anxiety disorder)   • Hospital discharge follow-up     Advance Care Planning  Advance Directive is not on file.  ACP discussion was held with the patient during this visit. Patient has an advance directive (not in EMR), copy requested.          Objective    Vitals:    02/10/22 1351   BP: 129/70   BP Location: Right arm   Patient Position: Sitting   Cuff Size: Adult   Pulse: 68   Resp: 20   Temp: 97.7 °F (36.5 °C)   TempSrc: Temporal   SpO2: 97%   Weight: 82.6 kg (182 lb)   Height: 157.5 cm (62\")     BMI Readings from Last 1 Encounters:   02/10/22 33.29 kg/m²   BMI is above normal parameters. Recommendations include: exercise counseling and nutrition counseling    She does bike 3 miles a few days/week.    Does the patient have evidence of cognitive impairment? No    Physical Exam  Lab Results   Component Value Date    HGBA1C 6.51 (H) 02/01/2022            HEALTH RISK ASSESSMENT    Smoking Status:  Social History     Tobacco Use   Smoking Status Never Smoker   Smokeless Tobacco Never Used     Alcohol Consumption:  Social History     Substance and Sexual Activity   Alcohol Use Not Currently     Fall Risk Screen:    STEADI Fall Risk Assessment was completed, and patient is at MODERATE risk for falls. Assessment completed on:2/10/2022    Depression Screening:  PHQ-2/PHQ-9 Depression Screening 2/10/2022   Little interest or pleasure in doing things 0   Feeling down, depressed, or hopeless 0 "   Total Score 0       Health Habits and Functional and Cognitive Screening:  Functional & Cognitive Status 2/10/2022   Do you have difficulty preparing food and eating? No   Do you have difficulty bathing yourself, getting dressed or grooming yourself? No   Do you have difficulty using the toilet? No   Do you have difficulty moving around from place to place? No   Do you have trouble with steps or getting out of a bed or a chair? No   Current Diet Well Balanced Diet   Dental Exam Up to date   Eye Exam Up to date   Exercise (times per week) 3 times per week   Current Exercises Include Stationary Bicycling/Spin Class;Walking   Do you need help using the phone?  No   Are you deaf or do you have serious difficulty hearing?  No   Do you need help with transportation? No   Do you need help shopping? No   Do you need help preparing meals?  No   Do you need help with housework?  No   Do you need help with laundry? No   Do you need help taking your medications? No   Do you need help managing money? No   Do you ever drive or ride in a car without wearing a seat belt? No   Have you felt unusual stress, anger or loneliness in the last month? No   Who do you live with? Alone   If you need help, do you have trouble finding someone available to you? No   Have you been bothered in the last four weeks by sexual problems? No   Do you have difficulty concentrating, remembering or making decisions? No       Age-appropriate Screening Schedule:  Refer to the list below for future screening recommendations based on patient's age, sex and/or medical conditions. Orders for these recommended tests are listed in the plan section. The patient has been provided with a written plan.    Health Maintenance   Topic Date Due   • URINE MICROALBUMIN  Never done   • ZOSTER VACCINE (1 of 2) Never done   • DXA SCAN  07/02/2022   • HEMOGLOBIN A1C  08/01/2022   • LIPID PANEL  10/12/2022   • DIABETIC EYE EXAM  11/16/2022   • TDAP/TD VACCINES (2 - Td or  Tdap) 03/13/2024   • INFLUENZA VACCINE  Completed              She thinks she may be UTD on shingles vaccine. She is to discuss with her pharmacy.      Assessment/Plan   CMS Preventative Services Quick Reference  Risk Factors Identified During Encounter  Immunizations Discussed/Encouraged (specific Immunizations; Shingrix  Obesity/Overweight   The above risks/problems have been discussed with the patient.  Follow up actions/plans if indicated are seen below in the Assessment/Plan Section.  Pertinent information has been shared with the patient in the After Visit Summary.    Diagnoses and all orders for this visit:    1. Encounter for Medicare annual wellness exam (Primary)    2. Need for hepatitis C screening test  -     Hepatitis C antibody; Future        Follow Up:   Return for Next scheduled follow up.     An After Visit Summary and PPPS were made available to the patient.        \plain

## 2022-02-20 PROBLEM — F33.41 RECURRENT MAJOR DEPRESSION IN PARTIAL REMISSION: Status: ACTIVE | Noted: 2022-02-20

## 2022-02-20 NOTE — PROGRESS NOTES
"Chief Complaint  Hypothyroidism and Follow-up (pt wants to talk to you about a couple things, but not medical issues)    Subjective          Kelsey Durand presents to [facility]     History of Present Illness  Patient pleasant 76-year-old male with underlying hyperlipidemia, hypothyroidism, IFG versus diabetes, among others, who is coming in 2/22 for her routine 4-month follow-up.  She was hospitalized for episode of syncope at [facility] by me in 10/21.    Review of Systems   Constitutional: Negative for appetite change, fatigue and fever.   HENT: Negative for congestion and ear pain.    Eyes: Negative for blurred vision.   Respiratory: Negative for cough, chest tightness, shortness of breath and wheezing.    Cardiovascular: Negative for chest pain, palpitations and leg swelling.   Gastrointestinal: Negative for abdominal pain.   Genitourinary: Negative for difficulty urinating, dysuria and hematuria.   Musculoskeletal: Negative for arthralgias and gait problem.   Skin: Negative for skin lesions.   Neurological: Negative for syncope, memory problem and confusion.   Psychiatric/Behavioral: Negative for self-injury and depressed mood.       Objective   Vital Signs:   /77   Pulse 65   Temp 97.2 °F (36.2 °C)   Ht 157.5 cm (62.01\")   Wt 81.9 kg (180 lb 9.6 oz)   SpO2 98%   BMI 33.02 kg/m²           Physical Exam  Vitals and nursing note reviewed.   Constitutional:       General: She is not in acute distress.     Appearance: Normal appearance. She is not toxic-appearing.   HENT:      Head: Atraumatic.      Right Ear: External ear normal.      Left Ear: External ear normal.      Nose: Nose normal.      Mouth/Throat:      Mouth: Mucous membranes are moist.   Eyes:      General:         Right eye: No discharge.         Left eye: No discharge.      Extraocular Movements: Extraocular movements intact.      Pupils: Pupils are equal, round, and reactive to light.   Cardiovascular: "      Rate and Rhythm: Normal rate and regular rhythm.      Pulses: Normal pulses.      Heart sounds: Normal heart sounds. No murmur heard.  No gallop.    Pulmonary:      Effort: Pulmonary effort is normal. No respiratory distress.      Breath sounds: No wheezing, rhonchi or rales.   Abdominal:      General: There is no distension.      Palpations: Abdomen is soft. There is no mass.      Tenderness: There is no abdominal tenderness. There is no guarding.   Musculoskeletal:         General: No swelling or tenderness.      Cervical back: No tenderness.      Right lower leg: No edema.      Left lower leg: No edema.   Skin:     General: Skin is warm and dry.      Findings: No rash.   Neurological:      General: No focal deficit present.      Mental Status: She is alert and oriented to person, place, and time. Mental status is at baseline.      Motor: No weakness.      Gait: Gait normal.   Psychiatric:         Mood and Affect: Mood normal.         Thought Content: Thought content normal.          Result Review :   The following data was reviewed by: Chris Elder MD on 10/18/2021:                  Assessment and Plan    Diagnoses and all orders for this visit:    1. Acquired atrophy of thyroid (Primary)  Assessment & Plan:  TSH is 2.3 as of 10/21 office visit, and patient remains clinically euthyroid as of her 2/22 office visit.  Patient is stable on Synthroid 100 mcg daily, refill placed today, continue same.      Orders:  -     TSH; Future  -     T4, free; Future    2. Mixed hyperlipidemia  Assessment & Plan:  LDL of 79 is at goal as of her 10/21 office visit.  Patient is stable on very low-dose simvastatin, continue same as of 2/22, repeat levels on return to office..      Orders:  -     Lipid Panel; Future    3. Type 2 diabetes mellitus without complication, without long-term current use of insulin ([facility])  Assessment & Plan:  A1c was up from 6.0 to 6.5 as of 10/21 office visit, and remained stable at 6.5 as of her 2/22  "office visit.  Patient still does not require any medication, and will continue to keep a close eye on this.  She is aware of dietary issues to address.      Orders:  -     Hemoglobin A1c; Future    4. 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      5. Primary hypertension  Assessment & Plan:  Patient blood pressure remains well controlled as of 2/22 office visit on low-dose single agent, she stable to continue metoprolol 25 mg daily.    Orders:  -     Comprehensive Metabolic Panel; Future    6. Vitamin D deficiency  Assessment & Plan:  Vitamin D is 34 as of 2/22 office visit.  This is apparently without any specific supplementation, will continue to follow this periodically.       7. Primary osteoarthritis involving multiple joints  Assessment & Plan:  This appears to be reasonably controlled with over-the-counter Tylenol.  There are many days that she does not even need to use any.  Continue same treatment as of 2/22.        Other orders  -     levothyroxine (SYNTHROID, LEVOTHROID) 100 MCG tablet; Take 1 tablet by mouth Every Morning.  Dispense: 90 tablet; Refill: 1  -     escitalopram (Lexapro) 5 MG tablet; Take 1 tablet by mouth Daily.  Dispense: 90 tablet; Refill: 1     --  --  OLDER NOTES:  VISIT 4/21:  ANNUAL MEDICARE WELLNESS PHYSICAL 4/19 = reviewed all forms in office with pt; no new discoveries; active at mall=class and walks/ bike at home.  H.M. ISSUES:  BMI 30 and d/w watch carbs and walk.  --  \"HYPERTENSION\" controlled=better at home='s (off meds good while)---> d/w check occ and let us know=fine at home as of 4/21.    LIPIDS at goal with LDL 68...101 so f/u before change...93 fine---> 80 stable 4/21.  --  HYPOTHYROIDISM stable 10/16 with TSH 0.2 still=on same dose many years---> fine 4/21.  IFG mild/stable = FBS 92 with A1C 6.0---> 6.3 in 4/20---> 6.0 in 4/21.  --  ANXIETY D/O 1/19 " and has benzo to use PRN; d/w call if feels she needs to use it too often and will add SSRI...cheerful 10/19...will use low dose lexapro as of 4/20 OV---> seems better 10/20.  B12 TAJ=038 in 4/20.  --  SYNCOPE is ? seizure per Dr Valadez/Flash in [facility] over the weekend, so will get EEG (tongue bite/incontinent)...EEG was neg 9/15.  --  BMD 5/17 = spine 0.9, hip 0.6 is great---> BMD 7/20 = spine 1.0, hip -0.3 = great!!  VIT D DEF remains stable on 2000 qd...28 and d/w take routinely please...44---> same 10/20.  --  DJD no new c/o.  R KNEE PAIN with no trauma, comes and goes, exam is w/o laxity, so agree with repeat mobic and use brace and then PTA if no better.  --  A.R. no flare.  --  --  MMG 4/29/21 per Dr Lawler/Fabiola Pacheco.  COLON 9/14...polyp was hyperplastic, so defer f/u.  Adacel 3/14, Prevnar '16; Pneumovax #1 10/17; Hep A x2 10/18; d/w Shingrix 10/18; COVID x2 as of 4/21 with Moderna and I discussed with her she will be able to get the booster as soon as they get ready.  ( Samuel 7/13, 2 girls here=Leigh Ann (no kids) and Xiao=youngest is boy in 6th grade 10/20 and girl with Asperger's is Jr at [employer]).    Follow Up   Return in about 4 months (around 6/21/2022).  Patient was given instructions and counseling regarding her condition or for health maintenance advice. Please see specific information pulled into the AVS if appropriate.

## 2022-02-21 ENCOUNTER — OFFICE VISIT (OUTPATIENT)
Dept: INTERNAL MEDICINE | Facility: CLINIC | Age: 77
End: 2022-02-21

## 2022-02-21 VITALS
HEART RATE: 65 BPM | WEIGHT: 180.6 LBS | BODY MASS INDEX: 33.23 KG/M2 | DIASTOLIC BLOOD PRESSURE: 77 MMHG | TEMPERATURE: 97.2 F | OXYGEN SATURATION: 98 % | HEIGHT: 62 IN | SYSTOLIC BLOOD PRESSURE: 153 MMHG

## 2022-02-21 DIAGNOSIS — F33.41 RECURRENT MAJOR DEPRESSION IN PARTIAL REMISSION: ICD-10-CM

## 2022-02-21 DIAGNOSIS — E03.4 ACQUIRED ATROPHY OF THYROID: Primary | ICD-10-CM

## 2022-02-21 DIAGNOSIS — I10 PRIMARY HYPERTENSION: ICD-10-CM

## 2022-02-21 DIAGNOSIS — E55.9 VITAMIN D DEFICIENCY: ICD-10-CM

## 2022-02-21 DIAGNOSIS — E78.2 MIXED HYPERLIPIDEMIA: ICD-10-CM

## 2022-02-21 DIAGNOSIS — E11.9 TYPE 2 DIABETES MELLITUS WITHOUT COMPLICATION, WITHOUT LONG-TERM CURRENT USE OF INSULIN: ICD-10-CM

## 2022-02-21 DIAGNOSIS — M15.9 PRIMARY OSTEOARTHRITIS INVOLVING MULTIPLE JOINTS: ICD-10-CM

## 2022-02-21 PROCEDURE — 99214 OFFICE O/P EST MOD 30 MIN: CPT | Performed by: INTERNAL MEDICINE

## 2022-02-21 RX ORDER — LEVOTHYROXINE SODIUM 0.1 MG/1
100 TABLET ORAL EVERY MORNING
Qty: 90 TABLET | Refills: 1 | Status: SHIPPED | OUTPATIENT
Start: 2022-02-21 | End: 2022-04-26 | Stop reason: SDUPTHER

## 2022-02-21 RX ORDER — ESCITALOPRAM OXALATE 5 MG/1
5 TABLET ORAL DAILY
Qty: 90 TABLET | Refills: 1 | Status: SHIPPED | OUTPATIENT
Start: 2022-02-21 | End: 2022-10-03

## 2022-02-21 NOTE — ASSESSMENT & PLAN NOTE
This appears to be reasonably controlled with over-the-counter Tylenol.  There are many days that she does not even need to use any.  Continue same treatment as of 2/22.

## 2022-02-21 NOTE — ASSESSMENT & PLAN NOTE
Patient is being seen for transitional care visit as of 10/18/2021.  She is certainly very stable, her dehydration was corrected in the hospital, and she has been eating and drinking well since discharge. The summary was reviewed with the pertinent details as noted below:    Hospital Course:     1.  Syncope.  Patient has had recent gastrointestinal illness with decreased p.o. intake and diarrhea.  She is somewhat prerenal at this time, so hopefully that is the etiology of her symptoms.  We will have her on telemetry as a precaution.---> Patient was stable overnight, there was no significant arrhythmia, she had an episode of bradycardia down to 56 only.  She been up and ambulating in the room without any difficulties.  The weakness she was suffering in the ER has since resolved.  The patient had no loss of bowel or bladder control there was no significant confusion following the event, so it does not appear to have been a seizure.  No obvious cardiac etiology ascertained this admission.  The patient was ill prior to this as noted with diarrhea and generalized weakness, so is most likely this was a orthostatic type event.  Patient appears stable for discharge home at this time with close outpatient follow-up.     2.  Dehydration.  Patient was bolused in the ER, will have her on normal saline at 100 on the floor as well.--->  Follow-up labs in AM 10/9 are improved and within normal limits.  Patient is tolerating p.o. diet just fine, will DC IV fluids and DC her home.

## 2022-02-21 NOTE — ASSESSMENT & PLAN NOTE
LDL of 79 is at goal as of her 10/21 office visit.  Patient is stable on very low-dose simvastatin, continue same as of 2/22, repeat levels on return to office..

## 2022-02-21 NOTE — ASSESSMENT & PLAN NOTE
A1c was up from 6.0 to 6.5 as of 10/21 office visit, and remained stable at 6.5 as of her 2/22 office visit.  Patient still does not require any medication, and will continue to keep a close eye on this.  She is aware of dietary issues to address.

## 2022-02-21 NOTE — ASSESSMENT & PLAN NOTE
Patient blood pressure remains well controlled as of 2/22 office visit on low-dose single agent, she stable to continue metoprolol 25 mg daily.

## 2022-02-21 NOTE — ASSESSMENT & PLAN NOTE
TSH is 2.3 as of 10/21 office visit, and patient remains clinically euthyroid as of her 2/22 office visit.  Patient is stable on Synthroid 100 mcg daily, refill placed today, continue same.

## 2022-02-21 NOTE — ASSESSMENT & PLAN NOTE
Vitamin D is 34 as of 2/22 office visit.  This is apparently without any specific supplementation, will continue to follow this periodically.

## 2022-04-05 ENCOUNTER — OFFICE VISIT (OUTPATIENT)
Dept: INTERNAL MEDICINE | Facility: CLINIC | Age: 77
End: 2022-04-05

## 2022-04-05 ENCOUNTER — HOSPITAL ENCOUNTER (OUTPATIENT)
Dept: GENERAL RADIOLOGY | Facility: HOSPITAL | Age: 77
Discharge: HOME | End: 2022-04-05
Admitting: INTERNAL MEDICINE

## 2022-04-05 VITALS
SYSTOLIC BLOOD PRESSURE: 129 MMHG | HEART RATE: 66 BPM | WEIGHT: 180.4 LBS | HEIGHT: 62 IN | BODY MASS INDEX: 33.2 KG/M2 | OXYGEN SATURATION: 95 % | DIASTOLIC BLOOD PRESSURE: 81 MMHG | TEMPERATURE: 97.5 F

## 2022-04-05 DIAGNOSIS — I10 PRIMARY HYPERTENSION: Primary | ICD-10-CM

## 2022-04-05 DIAGNOSIS — M79.671 RIGHT FOOT PAIN: ICD-10-CM

## 2022-04-05 PROCEDURE — 99213 OFFICE O/P EST LOW 20 MIN: CPT | Performed by: INTERNAL MEDICINE

## 2022-04-05 PROCEDURE — 73630 X-RAY EXAM OF FOOT: CPT

## 2022-04-05 RX ORDER — ACETAMINOPHEN 500 MG
500 TABLET ORAL EVERY 6 HOURS PRN
COMMUNITY

## 2022-04-05 RX ORDER — SIMVASTATIN 10 MG
10 TABLET ORAL EVERY OTHER DAY
Qty: 90 TABLET | Refills: 1 | Status: SHIPPED | OUTPATIENT
Start: 2022-04-05 | End: 2022-11-03 | Stop reason: SDUPTHER

## 2022-04-05 NOTE — PROGRESS NOTES
"Chief Complaint  Foot Swelling (Pt states that yesterday she was walking back to the house her right leg began hurt says that she fell but not hard, later she noticed a bruise on her right foot.)    Subjective          Kelsey Durand presents to [facility]     History of Present Illness  Patient pleasant 76-year-old male with underlying hyperlipidemia, hypothyroidism, IFG versus diabetes, among others, who was seen 2/22 for her routine 4-month follow-up.  She was hospitalized for episode of syncope at [facility] by me in 10/21. She is coming in now 4/22 for an urgent visit as described above in the CC and below in the ROS.    Review of Systems   Constitutional: Negative for appetite change, fatigue and fever.   HENT: Negative for congestion and ear pain.    Eyes: Negative for blurred vision.   Respiratory: Negative for cough, chest tightness, shortness of breath and wheezing.    Cardiovascular: Negative for chest pain, palpitations and leg swelling.   Gastrointestinal: Negative for abdominal pain.   Genitourinary: Negative for difficulty urinating, dysuria and hematuria.   Musculoskeletal: Negative for arthralgias and gait problem.   Skin: Negative for skin lesions.   Neurological: Negative for syncope, memory problem and confusion.   Psychiatric/Behavioral: Negative for self-injury and depressed mood.       Objective   Vital Signs:   /81   Pulse 66   Temp 97.5 °F (36.4 °C)   Ht 157.5 cm (62.01\")   Wt 81.8 kg (180 lb 6.4 oz)   SpO2 95%   BMI 32.99 kg/m²           Physical Exam  Vitals and nursing note reviewed.   Constitutional:       General: She is not in acute distress.     Appearance: Normal appearance. She is not toxic-appearing.   HENT:      Head: Atraumatic.      Right Ear: External ear normal.      Left Ear: External ear normal.      Nose: Nose normal.      Mouth/Throat:      Mouth: Mucous membranes are moist.   Eyes:      General:         Right eye: No " discharge.         Left eye: No discharge.      Extraocular Movements: Extraocular movements intact.      Pupils: Pupils are equal, round, and reactive to light.   Cardiovascular:      Rate and Rhythm: Normal rate and regular rhythm.      Pulses: Normal pulses.      Heart sounds: Normal heart sounds. No murmur heard.    No gallop.   Pulmonary:      Effort: Pulmonary effort is normal. No respiratory distress.      Breath sounds: No wheezing, rhonchi or rales.   Abdominal:      General: There is no distension.      Palpations: Abdomen is soft. There is no mass.      Tenderness: There is no abdominal tenderness. There is no guarding.   Musculoskeletal:         General: No swelling or tenderness.      Cervical back: No tenderness.      Right lower leg: No edema.      Left lower leg: No edema.   Skin:     General: Skin is warm and dry.      Findings: No rash.   Neurological:      General: No focal deficit present.      Mental Status: She is alert and oriented to person, place, and time. Mental status is at baseline.      Motor: No weakness.      Gait: Gait normal.   Psychiatric:         Mood and Affect: Mood normal.         Thought Content: Thought content normal.          Result Review :   The following data was reviewed by: Chris Elder MD on 10/18/2021:                  Assessment and Plan    Diagnoses and all orders for this visit:    1. Primary hypertension (Primary)  Assessment & Plan:  Patient blood pressure remains well controlled as of her 4/22 urgent visit.  Continue with low-dose metoprolol only.      2. Right foot pain  Assessment & Plan:  Patient went down yesterday, twisted her foot, having pain on the right anterior lateral foot, there is some swelling noted.  Ankle itself is intact, no laxity.  She is controlling the pain with alternating Tylenol/Motrin.  Could not rule out fracture, agree with x-ray of foot, will take things from there.    Orders:  -     XR Foot 3+ View Right; Future    Other orders  -   "   simvastatin (ZOCOR) 10 MG tablet; Take 1 tablet by mouth Every Other Day.  Dispense: 90 tablet; Refill: 1  -     Diclofenac Sodium (VOLTAREN) 1 % gel gel; Apply 4 g topically to the appropriate area as directed 4 (Four) Times a Day As Needed (Right foot pain).  Dispense: 100 g; Refill: 1     --  --  OLDER NOTES:  VISIT 4/21:  ANNUAL MEDICARE WELLNESS PHYSICAL 4/19 = reviewed all forms in office with pt; no new discoveries; active at mall=class and walks/ bike at home.  H.M. ISSUES:  BMI 30 and d/w watch carbs and walk.  --  \"HYPERTENSION\" controlled=better at home='s (off meds good while)---> d/w check occ and let us know=fine at home as of 4/21.    LIPIDS at goal with LDL 68...101 so f/u before change...93 fine---> 80 stable 4/21.  --  HYPOTHYROIDISM stable 10/16 with TSH 0.2 still=on same dose many years---> fine 4/21.  IFG mild/stable = FBS 92 with A1C 6.0---> 6.3 in 4/20---> 6.0 in 4/21.  --  ANXIETY D/O 1/19 and has benzo to use PRN; d/w call if feels she needs to use it too often and will add SSRI...cheerful 10/19...will use low dose lexapro as of 4/20 OV---> seems better 10/20.  B12 ZQT=578 in 4/20.  --  SYNCOPE is ? seizure per Dr Valadez/Flash in [facility] over the weekend, so will get EEG (tongue bite/incontinent)...EEG was neg 9/15.  --  BMD 5/17 = spine 0.9, hip 0.6 is great---> BMD 7/20 = spine 1.0, hip -0.3 = great!!  VIT D DEF remains stable on 2000 qd...28 and d/w take routinely please...44---> same 10/20.  --  DJD no new c/o.  R KNEE PAIN with no trauma, comes and goes, exam is w/o laxity, so agree with repeat mobic and use brace and then PTA if no better.  --  A.R. no flare.  --  --  MMG 4/29/21 per Dr Lawler/Fabiola Pacheco.  COLON 9/14...polyp was hyperplastic, so defer f/u.  Adacel 3/14, Prevnar '16; Pneumovax #1 10/17; Hep A x2 10/18; d/w Shingrix 10/18; COVID x2 as of 4/21 with Moderna and I discussed with her she will be able to get the booster as soon as they get ready.  ( Samuel 7/13, " 2 girls here=Leigh Ann (no kids) and Xiao=youngest is boy in 6th grade 10/20 and girl with Asperger's is Jr at [employer]).    Follow Up   Return for Next scheduled follow up.  Patient was given instructions and counseling regarding her condition or for health maintenance advice. Please see specific information pulled into the AVS if appropriate.

## 2022-04-05 NOTE — ASSESSMENT & PLAN NOTE
Patient blood pressure remains well controlled as of her 4/22 urgent visit.  Continue with low-dose metoprolol only.

## 2022-04-05 NOTE — ASSESSMENT & PLAN NOTE
Patient went down yesterday, twisted her foot, having pain on the right anterior lateral foot, there is some swelling noted.  Ankle itself is intact, no laxity.  She is controlling the pain with alternating Tylenol/Motrin.  Could not rule out fracture, agree with x-ray of foot, will take things from there.

## 2022-04-07 ENCOUNTER — TELEPHONE (OUTPATIENT)
Dept: INTERNAL MEDICINE | Facility: CLINIC | Age: 77
End: 2022-04-07

## 2022-04-07 NOTE — TELEPHONE ENCOUNTER
Hub staff attempted to follow warm transfer process and was unsuccessful     Caller: Kelsey Durand    Relationship to patient: Self    Best call back number: [phone]    Patient is needing: THE PATIENT WOULD LIKE A CALL BACK FROM THE REFERRAL SPECIALIST TO CONFIRM IF AN APPOINTMENT WAS MADE FOR DR. SPARKS'S OFFICE.         
I called the PT & got things done. Thanks.  
13-Mar-2020 17:00

## 2022-04-08 ENCOUNTER — OFFICE VISIT (OUTPATIENT)
Dept: ORTHOPEDIC SURGERY | Facility: CLINIC | Age: 77
End: 2022-04-08

## 2022-04-08 VITALS — OXYGEN SATURATION: 98 % | BODY MASS INDEX: 33.13 KG/M2 | HEIGHT: 62 IN | HEART RATE: 77 BPM | WEIGHT: 180 LBS

## 2022-04-08 DIAGNOSIS — S92.354A CLOSED NONDISPLACED FRACTURE OF FIFTH METATARSAL BONE OF RIGHT FOOT, INITIAL ENCOUNTER: Primary | ICD-10-CM

## 2022-04-08 PROCEDURE — 99203 OFFICE O/P NEW LOW 30 MIN: CPT | Performed by: STUDENT IN AN ORGANIZED HEALTH CARE EDUCATION/TRAINING PROGRAM

## 2022-04-08 NOTE — PROGRESS NOTES
"Chief Complaint  Pain of the Right Foot    Subjective          Kelsey Durand presents to [facility] for   History of Present Illness    Kelsey presents today for evaluation of her right foot.  She fell on Monday 4/4 while taking out the trash.  She developed lateral foot pain.  X-rays revealed a nondisplaced fracture the base of the fifth metatarsal.  She was not provided with any immobilization initially.  She denies pain elsewhere.  She is ambulating with a walker.    Allergies   Allergen Reactions   • Sulfa Antibiotics Rash and Hives        Social History     Socioeconomic History   • Marital status:    Tobacco Use   • Smoking status: Never Smoker   • Smokeless tobacco: Never Used   Vaping Use   • Vaping Use: Never used   Substance and Sexual Activity   • Alcohol use: Not Currently   • Drug use: Never   • Sexual activity: Defer        I reviewed the patient's chief complaint, history of present illness, review of systems, past medical history, surgical history, family history, social history, medications, and allergy list.     REVIEW OF SYSTEMS    Constitutional: Denies fevers, chills, weight loss  Cardiovascular: Denies chest pain, shortness of breath  Skin: Denies rashes, acute skin changes  Neurologic: Denies headache, loss of consciousness  MSK: Right foot pain      Objective   Vital Signs:   Pulse 77   Ht 157.5 cm (62\")   Wt 81.6 kg (180 lb)   SpO2 98%   BMI 32.92 kg/m²     Body mass index is 32.92 kg/m².    Physical Exam    General: Alert. No acute distress.   Right lower extremity: Mild swelling over the lateral border of the foot.  No wounds.  Tender to palpation over the base of the fifth metatarsal.  No areas of tenderness elsewhere.  No pain with active ankle range of motion.  Sensation intact over the dorsal and plantar foot.  Calf soft and nontender.  Palpable dorsalis pedis pulse.    Procedures    Imaging Results (Most Recent)     None                 "   Assessment and Plan    Diagnoses and all orders for this visit:    1. Closed nondisplaced fracture of fifth metatarsal bone of right foot, initial encounter (Primary)        We discussed nonoperative management.  A fracture boot was applied today.  She may weight-bear as tolerated in the boot.  Should use walker for stabilization.  Discussed ice, elevation, and gentle ankle range of motion exercises.  Follow-up in 2 weeks for reevaluation.  We will obtain new x-rays of the right foot when she returns.    Call or return if symptoms worsen or patient has any concerns.   Will obtain X-Rays of right foot at next visit.     Scribed for Roel Joe MD by Roel Joe MD  04/08/2022   11:36 EDT         Follow Up   Return in about 2 weeks (around 4/22/2022).  Patient was given instructions and counseling regarding her condition or for health maintenance advice. Please see specific information pulled into the AVS if appropriate.       I have personally performed the services described in this document as scribed by the above individual and it is both accurate and complete.     Roel Joe MD  04/08/22  11:37 EDT

## 2022-04-25 ENCOUNTER — OFFICE VISIT (OUTPATIENT)
Dept: ORTHOPEDIC SURGERY | Facility: CLINIC | Age: 77
End: 2022-04-25

## 2022-04-25 VITALS — HEIGHT: 62 IN | HEART RATE: 69 BPM | WEIGHT: 180 LBS | BODY MASS INDEX: 33.13 KG/M2 | OXYGEN SATURATION: 94 %

## 2022-04-25 DIAGNOSIS — S92.354D CLOSED NONDISPLACED FRACTURE OF FIFTH METATARSAL BONE OF RIGHT FOOT WITH ROUTINE HEALING, SUBSEQUENT ENCOUNTER: ICD-10-CM

## 2022-04-25 DIAGNOSIS — M79.671 RIGHT FOOT PAIN: Primary | ICD-10-CM

## 2022-04-25 PROCEDURE — 99213 OFFICE O/P EST LOW 20 MIN: CPT | Performed by: STUDENT IN AN ORGANIZED HEALTH CARE EDUCATION/TRAINING PROGRAM

## 2022-04-25 NOTE — PROGRESS NOTES
"Chief Complaint  Pain and Follow-up of the Right Foot    Subjective          Kelsey Durand presents to [facility] for   History of Present Illness    The patient presents here today for follow up evaluation of the right foot. The patient has been treating her 4th metatarsal fracture conservatively. She has been wearing a CAM walker boot. She ambulates with a walker. She reports she still has occasional pain but her swelling has improved. She fell on Monday 4/4 while taking out the trash and that initially caused her injury.   Allergies   Allergen Reactions   • Sulfa Antibiotics Rash and Hives        Social History     Socioeconomic History   • Marital status:    Tobacco Use   • Smoking status: Never Smoker   • Smokeless tobacco: Never Used   Vaping Use   • Vaping Use: Never used   Substance and Sexual Activity   • Alcohol use: Not Currently   • Drug use: Never   • Sexual activity: Not Currently     Birth control/protection: None     Comment: I am 77 years old! why!        I reviewed the patient's chief complaint, history of present illness, review of systems, past medical history, surgical history, family history, social history, medications, and allergy list.     REVIEW OF SYSTEMS    Constitutional: Denies fevers, chills, weight loss  Cardiovascular: Denies chest pain, shortness of breath  Skin: Denies rashes, acute skin changes  Neurologic: Denies headache, loss of consciousness  MSK: right foot pain      Objective   Vital Signs:   Pulse 69   Ht 157.5 cm (62\")   Wt 81.6 kg (180 lb)   SpO2 94%   BMI 32.92 kg/m²     Body mass index is 32.92 kg/m².    Physical Exam    General: Alert. No acute distress.   Right foot- tender to the 5th metatarsal achilles intact. Intact ankle plantar flexion and dorsiflexion. Neurovascularly intact. Positive EHL, FHL, GS and TA. Sensation intact to all 5 nerves of the foot. Positive pulses. No pain with Syndesmotic squeeze. Ankle stable to " stress. Neurovascularly intact.     Procedures    Imaging Results (Most Recent)     Procedure Component Value Units Date/Time    XR Foot 3+ View Right [[id number]] Resulted: 04/25/22 1708     Updated: 04/25/22 1709    Narrative:      Indications: Follow-up right fifth metatarsal fracture    Views: AP, oblique, lateral right foot    Findings: Fifth metatarsal base fracture again seen.  This remains   nondisplaced.  No obvious callus formation at this time.  No additional   fractures noted.  Arthritic changes at the midfoot and forefoot are   stable.    Comparative Data: Comparative data found and reviewed today                     Assessment and Plan    Diagnoses and all orders for this visit:    1. Right foot pain (Primary)  -     XR Foot 3+ View Right    2. Closed nondisplaced fracture of fifth metatarsal bone of right foot with routine healing, subsequent encounter        Discussed the treatment plan with the patient.  I reviewed the x-rays that were obtained today with the patient. Plan to continue the CAM walker boot for another 4 weeks. Will likely transition out of the boot at that time.     Call or return if symptoms worsen or patient has any concerns.   Will obtain X-Rays of Right foot at next visit.     Scribed for Roel Joe MD by Roel Joe MD  04/25/2022   13:23 EDT         Follow Up   Return in about 4 weeks (around 5/23/2022).  Patient was given instructions and counseling regarding her condition or for health maintenance advice. Please see specific information pulled into the AVS if appropriate.       I have personally performed the services described in this document as scribed by the above individual and it is both accurate and complete.     Roel Joe MD  04/26/22  09:55 EDT

## 2022-04-26 ENCOUNTER — TELEPHONE (OUTPATIENT)
Dept: INTERNAL MEDICINE | Facility: CLINIC | Age: 77
End: 2022-04-26

## 2022-04-26 RX ORDER — LEVOTHYROXINE SODIUM 0.1 MG/1
100 TABLET ORAL EVERY MORNING
Qty: 90 TABLET | Refills: 1 | Status: SHIPPED | OUTPATIENT
Start: 2022-04-26 | End: 2022-10-18 | Stop reason: SDUPTHER

## 2022-05-02 ENCOUNTER — HOSPITAL ENCOUNTER (OUTPATIENT)
Dept: MAMMOGRAPHY | Facility: HOSPITAL | Age: 77
Discharge: HOME | End: 2022-05-02
Admitting: OBSTETRICS & GYNECOLOGY

## 2022-05-02 DIAGNOSIS — Z12.31 ENCOUNTER FOR SCREENING MAMMOGRAM FOR MALIGNANT NEOPLASM OF BREAST: ICD-10-CM

## 2022-05-02 PROCEDURE — 77067 SCR MAMMO BI INCL CAD: CPT

## 2022-05-02 PROCEDURE — 77063 BREAST TOMOSYNTHESIS BI: CPT

## 2022-05-24 RX ORDER — METOPROLOL SUCCINATE 25 MG/1
25 TABLET, EXTENDED RELEASE ORAL DAILY
Qty: 90 TABLET | Refills: 1 | Status: SHIPPED | OUTPATIENT
Start: 2022-05-24 | End: 2022-11-22 | Stop reason: SDUPTHER

## 2022-05-25 ENCOUNTER — OFFICE VISIT (OUTPATIENT)
Dept: ORTHOPEDIC SURGERY | Facility: CLINIC | Age: 77
End: 2022-05-25

## 2022-05-25 VITALS — HEIGHT: 62 IN | OXYGEN SATURATION: 93 % | HEART RATE: 60 BPM | WEIGHT: 180 LBS | BODY MASS INDEX: 33.13 KG/M2

## 2022-05-25 DIAGNOSIS — M79.671 RIGHT FOOT PAIN: Primary | ICD-10-CM

## 2022-05-25 DIAGNOSIS — S92.354D CLOSED NONDISPLACED FRACTURE OF FIFTH METATARSAL BONE OF RIGHT FOOT WITH ROUTINE HEALING, SUBSEQUENT ENCOUNTER: ICD-10-CM

## 2022-05-25 PROCEDURE — 99213 OFFICE O/P EST LOW 20 MIN: CPT | Performed by: PHYSICIAN ASSISTANT

## 2022-05-25 NOTE — PROGRESS NOTES
"Chief Complaint  Pain and Follow-up of the Right Foot    Subjective          Kelsey Durand presents to [facility] for   History of Present Illness    The patinet presents here today for follow up evaluation of the right foot. The patient has been treating her 4th metatarsal fracture conservatively. She has been wearing a CAM walker boot. She reports she has tried walking without the boot and denies pain or complications. She denies new injury. Denies numbness or tingling.     Allergies   Allergen Reactions   • Sulfa Antibiotics Rash and Hives        Social History     Socioeconomic History   • Marital status:    Tobacco Use   • Smoking status: Never Smoker   • Smokeless tobacco: Never Used   Vaping Use   • Vaping Use: Never used   Substance and Sexual Activity   • Alcohol use: Not Currently   • Drug use: Never   • Sexual activity: Not Currently     Birth control/protection: None     Comment: I am 77 years old! why!        I reviewed the patient's chief complaint, history of present illness, review of systems, past medical history, surgical history, family history, social history, medications, and allergy list.     REVIEW OF SYSTEMS    Constitutional: Denies fevers, chills, weight loss  Cardiovascular: Denies chest pain, shortness of breath  Skin: Denies rashes, acute skin changes  Neurologic: Denies headache, loss of consciousness  MSK: Right foot pain      Objective   Vital Signs:   Pulse 60   Ht 157.5 cm (62\")   Wt 81.6 kg (180 lb)   SpO2 93%   BMI 32.92 kg/m²     Body mass index is 32.92 kg/m².    Physical Exam    General: Alert. No acute distress.   Right foot- - tender to the lateral 5th metatarsal. achilles intact. Intact ankle plantar flexion and dorsiflexion. Neurovascularly intact. Positive EHL, FHL, GS and TA. Sensation intact to all 5 nerves of the foot. Positive pulses. No pain with Syndesmotic squeeze. Ankle stable to ligamentous stress. Neurovascularly " intact. calf soft, nontender. Toe range of motion intact.     Procedures    Imaging Results (Most Recent)     Procedure Component Value Units Date/Time    XR Foot 3+ View Right [[id number]] Resulted: 05/25/22 1331     Updated: 05/25/22 1334                   Assessment and Plan        Mammo Screening Digital Tomosynthesis Bilateral With CAD    Result Date: 5/2/2022  Narrative: PROCEDURE: MAMMO SCREENING DIGITAL TOMOSYNTHESIS BILATERAL W CAD  COMPARISON: [facility], [place], DIG SCREENING BILAT KASI W 3D MARCO A, 2/23/2018, 9:17.  [facility], [place], DIG SCREENING BILAT KASI W 3D MARCO A, 2/27/2019, 9:56.  [facility], [place], DIG SCREENING BILAT KASI W 3D MARCO A, 3/02/2020, 11:04.  [facility], [place], DIG SCREENING BILAT KSAI W 3D MARCO A, 4/29/2021, 8:12.  VIEWS:  BILATERAL CC AND MLO VIEWS WERE OBTAINED UTILIZING 3D TOMOSYNTHESIS AND R2 CAD SOFTWARE  INDICATIONS: screening  FINDINGS:  Stable bilateral benign-type calcifications.  No suspicious mass, area of architectural distortion or suspicious microcalcification is identified.      Impression:  Benign mammogram. Suggest routine mammographic screening.  RECOMMENDATION(S):  ROUTINE MAMMOGRAM AND CLINICAL EVALUATION IN 12 MONTHS.   BIRADS:  DIAGNOSTIC CATEGORY 2--BENIGN FINDING   BREAST COMPOSITION: Scattered areas fibroglandular density.  PLEASE NOTE:  A NORMAL MAMMOGRAM DOES NOT EXCLUDE THE POSSIBILITY OF BREAST CANCER. ANY CLINICALLY SUSPICIOUS PALPABLE LUMP SHOULD BE BIOPSIED.      Tony Mahan M.D.       Electronically Signed and Approved By: Tony Mahan M.D. on 5/02/2022 at 9:41                Diagnoses and all orders for this visit:    1. Right foot pain (Primary)  -     XR Foot 3+ View Right    2. Closed nondisplaced fracture of fifth metatarsal bone of right foot with routine healing, subsequent encounter        Discussed the treatment plan with the patient.  I reviewed the x-rays that were  obtained today with the patient. Her fracture is healing well. Plan to transition into a hard sole shoe, hard sole shoe given today.We discussed a gradual transition as tolerated. Patient understood and agreed. Plan to continue working on ankle ROM. Use ice and elevation as needed.       Will obtain X-Rays of Right foot at next visit.     Call or return if worsening symptoms.    Scribed for Lisa Lucia PA-C by Agnes Pimentel  05/25/2022   14:29 EDT         Follow Up   Return in about 4 weeks (around 6/22/2022).  Patient was given instructions and counseling regarding her condition or for health maintenance advice. Please see specific information pulled into the AVS if appropriate.       I have personally performed the services described in this document as scribed by the above individual and it is both accurate and complete.     Lisa Lucia PA-C  05/25/22  14:32 EDT

## 2022-06-13 ENCOUNTER — LAB (OUTPATIENT)
Dept: LAB | Facility: HOSPITAL | Age: 77
End: 2022-06-13

## 2022-06-13 DIAGNOSIS — E03.4 ACQUIRED ATROPHY OF THYROID: ICD-10-CM

## 2022-06-13 DIAGNOSIS — E78.2 MIXED HYPERLIPIDEMIA: ICD-10-CM

## 2022-06-13 DIAGNOSIS — Z11.59 NEED FOR HEPATITIS C SCREENING TEST: ICD-10-CM

## 2022-06-13 DIAGNOSIS — E11.9 TYPE 2 DIABETES MELLITUS WITHOUT COMPLICATION, WITHOUT LONG-TERM CURRENT USE OF INSULIN: ICD-10-CM

## 2022-06-13 DIAGNOSIS — I10 PRIMARY HYPERTENSION: ICD-10-CM

## 2022-06-13 LAB
ALBUMIN SERPL-MCNC: 4 G/DL (ref 3.5–5.2)
ALBUMIN/GLOB SERPL: 1.3 G/DL
ALP SERPL-CCNC: 50 U/L (ref 39–117)
ALT SERPL W P-5'-P-CCNC: 17 U/L (ref 1–33)
ANION GAP SERPL CALCULATED.3IONS-SCNC: 11.8 MMOL/L (ref 5–15)
AST SERPL-CCNC: 21 U/L (ref 1–32)
BILIRUB SERPL-MCNC: 0.8 MG/DL (ref 0–1.2)
BUN SERPL-MCNC: 12 MG/DL (ref 8–23)
BUN/CREAT SERPL: 17.6 (ref 7–25)
CALCIUM SPEC-SCNC: 9 MG/DL (ref 8.6–10.5)
CHLORIDE SERPL-SCNC: 101 MMOL/L (ref 98–107)
CHOLEST SERPL-MCNC: 158 MG/DL (ref 0–200)
CO2 SERPL-SCNC: 24.2 MMOL/L (ref 22–29)
CREAT SERPL-MCNC: 0.68 MG/DL (ref 0.57–1)
EGFRCR SERPLBLD CKD-EPI 2021: 88.7 ML/MIN/1.73
GLOBULIN UR ELPH-MCNC: 3.1 GM/DL
GLUCOSE SERPL-MCNC: 96 MG/DL (ref 65–99)
HBA1C MFR BLD: 6.3 % (ref 4.8–5.6)
HCV AB SER DONR QL: NORMAL
HDLC SERPL-MCNC: 65 MG/DL (ref 40–60)
LDLC SERPL CALC-MCNC: 79 MG/DL (ref 0–100)
LDLC/HDLC SERPL: 1.21 {RATIO}
POTASSIUM SERPL-SCNC: 4.4 MMOL/L (ref 3.5–5.2)
PROT SERPL-MCNC: 7.1 G/DL (ref 6–8.5)
SODIUM SERPL-SCNC: 137 MMOL/L (ref 136–145)
T4 FREE SERPL-MCNC: 1.41 NG/DL (ref 0.93–1.7)
TRIGL SERPL-MCNC: 73 MG/DL (ref 0–150)
TSH SERPL DL<=0.05 MIU/L-ACNC: 0.99 UIU/ML (ref 0.27–4.2)
VLDLC SERPL-MCNC: 14 MG/DL (ref 5–40)

## 2022-06-13 PROCEDURE — 84443 ASSAY THYROID STIM HORMONE: CPT

## 2022-06-13 PROCEDURE — 36415 COLL VENOUS BLD VENIPUNCTURE: CPT

## 2022-06-13 PROCEDURE — 80053 COMPREHEN METABOLIC PANEL: CPT

## 2022-06-13 PROCEDURE — 83036 HEMOGLOBIN GLYCOSYLATED A1C: CPT

## 2022-06-13 PROCEDURE — 84439 ASSAY OF FREE THYROXINE: CPT

## 2022-06-13 PROCEDURE — 86803 HEPATITIS C AB TEST: CPT

## 2022-06-13 PROCEDURE — 80061 LIPID PANEL: CPT

## 2022-06-21 ENCOUNTER — OFFICE VISIT (OUTPATIENT)
Dept: INTERNAL MEDICINE | Facility: CLINIC | Age: 77
End: 2022-06-21

## 2022-06-21 VITALS
DIASTOLIC BLOOD PRESSURE: 79 MMHG | HEART RATE: 64 BPM | BODY MASS INDEX: 33.53 KG/M2 | OXYGEN SATURATION: 95 % | HEIGHT: 62 IN | TEMPERATURE: 97.3 F | WEIGHT: 182.2 LBS | SYSTOLIC BLOOD PRESSURE: 144 MMHG

## 2022-06-21 DIAGNOSIS — F33.41 RECURRENT MAJOR DEPRESSION IN PARTIAL REMISSION: ICD-10-CM

## 2022-06-21 DIAGNOSIS — E11.9 TYPE 2 DIABETES MELLITUS WITHOUT COMPLICATION, WITHOUT LONG-TERM CURRENT USE OF INSULIN: ICD-10-CM

## 2022-06-21 DIAGNOSIS — E03.4 ACQUIRED ATROPHY OF THYROID: Primary | ICD-10-CM

## 2022-06-21 DIAGNOSIS — Z00.00 WELL ADULT EXAM: ICD-10-CM

## 2022-06-21 DIAGNOSIS — E78.2 MIXED HYPERLIPIDEMIA: ICD-10-CM

## 2022-06-21 DIAGNOSIS — I10 PRIMARY HYPERTENSION: ICD-10-CM

## 2022-06-21 DIAGNOSIS — E55.9 VITAMIN D DEFICIENCY: ICD-10-CM

## 2022-06-21 PROCEDURE — 99214 OFFICE O/P EST MOD 30 MIN: CPT | Performed by: INTERNAL MEDICINE

## 2022-06-21 NOTE — ASSESSMENT & PLAN NOTE
LDL is very stable at 79 as of her 6/22 office visit.  Patient stable to continue very low-dose simvastatin only.

## 2022-06-21 NOTE — ASSESSMENT & PLAN NOTE
Thyroid is well normal as of her 6/22 office visit.  Patient stable to continue 100 mcg Synthroid daily.

## 2022-06-21 NOTE — ASSESSMENT & PLAN NOTE
Blood pressure remained stable as of her 6/22 office visit.  Patient stable to continue with low-dose metoprolol only.

## 2022-06-21 NOTE — ASSESSMENT & PLAN NOTE
A1c is down to 6.3 as of her 6/22 office visit.  She has still not required any medication for this, continue with conservative dietary restrictions only.

## 2022-06-21 NOTE — PROGRESS NOTES
"Chief Complaint  Hypothyroidism (Pt is here for routine follow up. Pt reports no new issues or concerns. )    Subjective          Kelsey Durand presents to [facility]     History of Present Illness  Patient pleasant 77-year-old female with underlying hyperlipidemia, hypothyroidism, IFG versus diabetes, among others, who is coming in 6/22 for her routine 4-month follow-up.      Review of Systems   Constitutional: Negative for appetite change, fatigue and fever.   HENT: Negative for congestion and ear pain.    Eyes: Negative for blurred vision.   Respiratory: Negative for cough, chest tightness, shortness of breath and wheezing.    Cardiovascular: Negative for chest pain, palpitations and leg swelling.   Gastrointestinal: Negative for abdominal pain.   Genitourinary: Negative for difficulty urinating, dysuria and hematuria.   Musculoskeletal: Negative for arthralgias and gait problem.   Skin: Negative for skin lesions.   Neurological: Negative for syncope, memory problem and confusion.   Psychiatric/Behavioral: Negative for self-injury and depressed mood.       Objective   Vital Signs:   /79   Pulse 64   Temp 97.3 °F (36.3 °C)   Ht 157.5 cm (62.01\")   Wt 82.6 kg (182 lb 3.2 oz)   SpO2 95%   BMI 33.32 kg/m²           Physical Exam  Vitals and nursing note reviewed.   Constitutional:       General: She is not in acute distress.     Appearance: Normal appearance. She is not toxic-appearing.   HENT:      Head: Atraumatic.      Right Ear: External ear normal.      Left Ear: External ear normal.      Nose: Nose normal.      Mouth/Throat:      Mouth: Mucous membranes are moist.   Eyes:      General:         Right eye: No discharge.         Left eye: No discharge.      Extraocular Movements: Extraocular movements intact.      Pupils: Pupils are equal, round, and reactive to light.   Cardiovascular:      Rate and Rhythm: Normal rate and regular rhythm.      Pulses: Normal " pulses.      Heart sounds: Normal heart sounds. No murmur heard.    No gallop.   Pulmonary:      Effort: Pulmonary effort is normal. No respiratory distress.      Breath sounds: No wheezing, rhonchi or rales.   Abdominal:      General: There is no distension.      Palpations: Abdomen is soft. There is no mass.      Tenderness: There is no abdominal tenderness. There is no guarding.   Musculoskeletal:         General: No swelling or tenderness.      Cervical back: No tenderness.      Right lower leg: No edema.      Left lower leg: No edema.   Skin:     General: Skin is warm and dry.      Findings: No rash.   Neurological:      General: No focal deficit present.      Mental Status: She is alert and oriented to person, place, and time. Mental status is at baseline.      Motor: No weakness.      Gait: Gait normal.   Psychiatric:         Mood and Affect: Mood normal.         Thought Content: Thought content normal.          Result Review :   The following data was reviewed by: Chris Elder MD on 10/18/2021:                  Assessment and Plan    Diagnoses and all orders for this visit:    1. Acquired atrophy of thyroid (Primary)  Assessment & Plan:  Thyroid is well normal as of her 6/22 office visit.  Patient stable to continue 100 mcg Synthroid daily.    Orders:  -     TSH+Free T4; Future    2. Mixed hyperlipidemia  Assessment & Plan:  LDL is very stable at 79 as of her 6/22 office visit.  Patient stable to continue very low-dose simvastatin only.    Orders:  -     Lipid Panel; Future    3. Type 2 diabetes mellitus without complication, without long-term current use of insulin (HCC)  Assessment & Plan:  A1c is down to 6.3 as of her 6/22 office visit.  She has still not required any medication for this, continue with conservative dietary restrictions only.    Orders:  -     Hemoglobin A1c; Future  -     Microalbumin / Creatinine Urine Ratio - Urine, Clean Catch; Future    4. 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      5. Primary hypertension  Assessment & Plan:  Blood pressure remained stable as of her 6/22 office visit.  Patient stable to continue with low-dose metoprolol only.    Orders:  -     Comprehensive Metabolic Panel; Future    6. Vitamin D deficiency  -     Vitamin D 25 Hydroxy; Future    7. Well adult exam  -     CBC & Differential; Future     --  --  OLDER NOTES:  VISIT 4/21:  ANNUAL MEDICARE WELLNESS PHYSICAL 4/19 = reviewed all forms in office with pt; no new discoveries; active at mall=class and walks/ bike at home.  H.M. ISSUES:  BMI 30 and d/w watch carbs and walk.  --  \"HYPERTENSION\" controlled=better at home='s (off meds good while)---> d/w check occ and let us know=fine at home as of 4/21.    LIPIDS at goal with LDL 68...101 so f/u before change...93 fine---> 80 stable 4/21.  --  HYPOTHYROIDISM stable 10/16 with TSH 0.2 still=on same dose many years---> fine 4/21.  IFG mild/stable = FBS 92 with A1C 6.0---> 6.3 in 4/20---> 6.0 in 4/21.  --  ANXIETY D/O 1/19 and has benzo to use PRN; d/w call if feels she needs to use it too often and will add SSRI...cheerful 10/19...will use low dose lexapro as of 4/20 OV---> seems better 10/20.  B12 CEH=475 in 4/20.  --  SYNCOPE is ? seizure per Dr Valadez/Flash in [facility] over the weekend, so will get EEG (tongue bite/incontinent)...EEG was neg 9/15.  --  BMD 5/17 = spine 0.9, hip 0.6 is great---> BMD 7/20 = spine 1.0, hip -0.3 = great!!  VIT D DEF remains stable on 2000 qd...28 and d/w take routinely please...44---> same 10/20.  --  DJD no new c/o.  R KNEE PAIN with no trauma, comes and goes, exam is w/o laxity, so agree with repeat mobic and use brace and then PTA if no better.  --  A.R. no flare.  --  --  MMG 4/29/21 per Dr Lawler/Fabiola Pacheco.  COLON 9/14...polyp was hyperplastic, " so defer f/u.  Adacel 3/14, Prevnar '16; Pneumovax #1 10/17; Hep A x2 10/18; d/w Shingrix 10/18;   ( Samuel 7/13, 2 girls here=Leigh Ann (no kids) and Xiao = youngest is boy in 6th grade 10/20 and girl with Asperger's is Jr at [employer]).    Follow Up   Return in about 6 months (around 12/21/2022).  Patient was given instructions and counseling regarding her condition or for health maintenance advice. Please see specific information pulled into the AVS if appropriate.

## 2022-06-21 NOTE — ASSESSMENT & PLAN NOTE
Patient having ongoing chronic health issues, but appears to be handling them very appropriately, patient certainly stable to continue low-dose SSRI.

## 2022-06-22 ENCOUNTER — OFFICE VISIT (OUTPATIENT)
Dept: ORTHOPEDIC SURGERY | Facility: CLINIC | Age: 77
End: 2022-06-22

## 2022-06-22 VITALS — HEIGHT: 62 IN | BODY MASS INDEX: 33.49 KG/M2 | WEIGHT: 182 LBS

## 2022-06-22 DIAGNOSIS — S92.354D CLOSED NONDISPLACED FRACTURE OF FIFTH METATARSAL BONE OF RIGHT FOOT WITH ROUTINE HEALING, SUBSEQUENT ENCOUNTER: Primary | ICD-10-CM

## 2022-06-22 PROCEDURE — 99213 OFFICE O/P EST LOW 20 MIN: CPT | Performed by: PHYSICIAN ASSISTANT

## 2022-06-22 NOTE — PROGRESS NOTES
"Chief Complaint  Follow-up of the Right Foot    Subjective          Kelsey Durand presents to [facility] for   History of Present Illness    Kelsey presents today for follow-up of her right foot.  Patient has a right fifth metatarsal fracture that we have been treating conservatively.  Today, she reports pain only on occasion.  She states that she has been wearing her hard sole shoe the majority of the time.  She reports occasional swelling.  She is taking Tylenol as needed for her pain.  She denies new injuries.  Denies numbness or tingling.      Allergies   Allergen Reactions   • Sulfa Antibiotics Rash and Hives        Social History     Socioeconomic History   • Marital status:    Tobacco Use   • Smoking status: Never Smoker   • Smokeless tobacco: Never Used   Vaping Use   • Vaping Use: Never used   Substance and Sexual Activity   • Alcohol use: Not Currently   • Drug use: Never   • Sexual activity: Not Currently     Birth control/protection: None     Comment: I am 77 years old! why!        I reviewed the patient's chief complaint, history of present illness, review of systems, past medical history, surgical history, family history, social history, medications, and allergy list.     REVIEW OF SYSTEMS    Constitutional: Denies fevers, chills, weight loss  Cardiovascular: Denies chest pain, shortness of breath  Skin: Denies rashes, acute skin changes  Neurologic: Denies headache, loss of consciousness  MSK: Right foot pain      Objective   Vital Signs:   Ht 157.5 cm (62\")   Wt 82.6 kg (182 lb)   BMI 33.29 kg/m²     Body mass index is 33.29 kg/m².    Physical Exam    General: Alert. No acute distress.   Right lower extremity: Tenderness to the fifth metatarsal.  No other areas of tenderness to palpation about the foot and ankle.  Achilles intact.  Calf soft, nontender.  No pain with syndesmotic squeeze test.  Demonstrates active ankle plantarflexion dorsiflexion.  " Dorsiflexion neutral.  Plantarflexion 30 degrees.  Toe range of motion intact.  Ankle stable ligamentous stress.  Sensation intact to the dorsal and plantar foot.  Palpable pedal pulses.    Procedures    Imaging Results (Most Recent)     Procedure Component Value Units Date/Time    XR Foot 3+ View Right [[id number]] Resulted: 06/22/22 1600     Updated: 06/22/22 1601    Narrative:      Indications: Follow-up right fifth metatarsal fracture    Views: AP, oblique, lateral right foot    Findings: Right fifth metatarsal base fracture is again seen.  Fracture   alignment remains stable.  All joints appear anatomically aligned.  No   additional fractures noted.    Comparative Data: Comparative data found and reviewed today.                   Assessment and Plan    Diagnoses and all orders for this visit:    1. Closed nondisplaced fracture of fifth metatarsal bone of right foot with routine healing, subsequent encounter (Primary)  -     XR Foot 3+ View Right        Kelsey presents today for follow-up of her right fifth metatarsal fracture that we have treated conservatively.  X-rays are reviewed with the patient today. Patient is instructed to continue working on ankle range of motion exercises.  Continue to use ice and elevation as needed.  Okay for patient to gradually discontinue hard sole shoe and transition to a regular shoe.  We discussed wearing a well supported shoe with ambulation.  Patient understood and agreed. Patient will follow up in 4 weeks for reevaluation.  We will obtain new x-rays of the right foot at next visit.    Call or return if symptoms worsen or patient has any concerns.   Will obtain X-Rays of right foot at next visit.         Follow Up   Return in about 4 weeks (around 7/20/2022).  Patient was given instructions and counseling regarding her condition or for health maintenance advice. Please see specific information pulled into the AVS if appropriate.     Lisa Lucia PA-C  06/22/22  16:02  EDT

## 2022-07-20 ENCOUNTER — OFFICE VISIT (OUTPATIENT)
Dept: ORTHOPEDIC SURGERY | Facility: CLINIC | Age: 77
End: 2022-07-20

## 2022-07-20 VITALS — BODY MASS INDEX: 32.2 KG/M2 | WEIGHT: 175 LBS | HEIGHT: 62 IN

## 2022-07-20 DIAGNOSIS — S92.354D CLOSED NONDISPLACED FRACTURE OF FIFTH METATARSAL BONE OF RIGHT FOOT WITH ROUTINE HEALING, SUBSEQUENT ENCOUNTER: Primary | ICD-10-CM

## 2022-07-20 PROCEDURE — 99213 OFFICE O/P EST LOW 20 MIN: CPT | Performed by: PHYSICIAN ASSISTANT

## 2022-07-20 NOTE — PROGRESS NOTES
"Chief Complaint  Pain and Follow-up of the Right Foot    Subjective          Kelsey Durand presents to [facility] for   History of Present Illness    Kelsey presents today for follow-up of her right foot.  Patient is a right fifth metatarsal fracture that we are treating nonoperatively.  Today, she states she has continued her [place] shoe while outside of the home but has been wearing a regular shoe at home without complications.  She states she is doing her home exercises.  She reports occasional numbness to her foot.  Denies new injuries.    Allergies   Allergen Reactions   • Sulfa Antibiotics Rash and Hives        Social History     Socioeconomic History   • Marital status:    Tobacco Use   • Smoking status: Never Smoker   • Smokeless tobacco: Never Used   Vaping Use   • Vaping Use: Never used   Substance and Sexual Activity   • Alcohol use: Not Currently   • Drug use: Never   • Sexual activity: Not Currently     Birth control/protection: None     Comment: I am 77 years old! why!        I reviewed the patient's chief complaint, history of present illness, review of systems, past medical history, surgical history, family history, social history, medications, and allergy list.     REVIEW OF SYSTEMS    Constitutional: Denies fevers, chills, weight loss  Cardiovascular: Denies chest pain, shortness of breath  Skin: Denies rashes, acute skin changes  Neurologic: Denies headache, loss of consciousness  MSK: Right foot pain      Objective   Vital Signs:   Ht 157.5 cm (62\")   Wt 79.4 kg (175 lb)   BMI 32.01 kg/m²     Body mass index is 32.01 kg/m².    Physical Exam    General: Alert. No acute distress.   Right lower extremity: Nontender to palpation about the foot.  Nontender to the medial or lateral ankle.  Calf soft, nontender.  Achilles intact.  Demonstrates active ankle plantarflexion and dorsiflexion.  Dorsiflexion neutral.  Plantarflexion 30 degrees.  Toe range of " motion intact.  Ankle stable ligamentous stress.  Sensation intact to the dorsal and plantar foot.  Palpable pedal pulses.    Procedures    Imaging Results (Most Recent)     Procedure Component Value Units Date/Time    XR Foot 3+ View Right [[id number]] Resulted: 07/20/22 1601     Updated: 07/20/22 1602    Narrative:      Indications: Follow-up right fifth metatarsal fracture    Views: AP, oblique, lateral right foot    Findings: Fracture at the base of the fifth metatarsal is again seen.    Fracture is well aligned and stable.  No additional fractures are noted.    All joints appear anatomically aligned.    Comparative Data: Comparative data found and reviewed today.                   Assessment and Plan    Diagnoses and all orders for this visit:    1. Closed nondisplaced fracture of fifth metatarsal bone of right foot with routine healing, subsequent encounter (Primary)  -     XR Foot 3+ View Right        Kelsey presents today for a follow-up of her right fifth metatarsal fracture that we have been treating nonoperatively.  X-rays were reviewed with the patient today.  Patient instructed to continue with her home exercises.  Continue with ice and elevation as needed.  Okay to wear regular shoe.  Continue with activities as tolerated.  Patient will follow-up as needed.    Call or return if symptoms worsen or patient has any concerns.         Follow Up   Return if symptoms worsen or fail to improve.  Patient was given instructions and counseling regarding her condition or for health maintenance advice. Please see specific information pulled into the AVS if appropriate.     Lisa Lucia PA-C  07/20/22  16:03 EDT

## 2022-08-24 ENCOUNTER — HOSPITAL ENCOUNTER (EMERGENCY)
Facility: HOSPITAL | Age: 77
Discharge: HOME | End: 2022-08-25
Attending: EMERGENCY MEDICINE | Admitting: EMERGENCY MEDICINE

## 2022-08-24 ENCOUNTER — APPOINTMENT (OUTPATIENT)
Dept: ULTRASOUND IMAGING | Facility: HOSPITAL | Age: 77
End: 2022-08-24

## 2022-08-24 DIAGNOSIS — R10.11 RUQ ABDOMINAL PAIN: Primary | ICD-10-CM

## 2022-08-24 LAB
ALBUMIN SERPL-MCNC: 3.5 G/DL (ref 3.5–5.2)
ALBUMIN/GLOB SERPL: 1.2 G/DL
ALP SERPL-CCNC: 45 U/L (ref 39–117)
ALT SERPL W P-5'-P-CCNC: 14 U/L (ref 1–33)
ANION GAP SERPL CALCULATED.3IONS-SCNC: 8.8 MMOL/L (ref 5–15)
AST SERPL-CCNC: 17 U/L (ref 1–32)
BACTERIA UR QL AUTO: ABNORMAL /HPF
BASOPHILS # BLD AUTO: 0.02 10*3/MM3 (ref 0–0.2)
BASOPHILS NFR BLD AUTO: 0.2 % (ref 0–1.5)
BILIRUB SERPL-MCNC: 1.6 MG/DL (ref 0–1.2)
BILIRUB UR QL STRIP: NEGATIVE
BUN SERPL-MCNC: 12 MG/DL (ref 8–23)
BUN/CREAT SERPL: 16.9 (ref 7–25)
CALCIUM SPEC-SCNC: 8.7 MG/DL (ref 8.6–10.5)
CHLORIDE SERPL-SCNC: 101 MMOL/L (ref 98–107)
CLARITY UR: CLEAR
CO2 SERPL-SCNC: 25.2 MMOL/L (ref 22–29)
COLOR UR: YELLOW
CREAT SERPL-MCNC: 0.71 MG/DL (ref 0.57–1)
D-LACTATE SERPL-SCNC: 1 MMOL/L (ref 0.5–2)
DEPRECATED RDW RBC AUTO: 46.1 FL (ref 37–54)
EGFRCR SERPLBLD CKD-EPI 2021: 86.6 ML/MIN/1.73
EOSINOPHIL # BLD AUTO: 0 10*3/MM3 (ref 0–0.4)
EOSINOPHIL NFR BLD AUTO: 0 % (ref 0.3–6.2)
ERYTHROCYTE [DISTWIDTH] IN BLOOD BY AUTOMATED COUNT: 14.3 % (ref 12.3–15.4)
GLOBULIN UR ELPH-MCNC: 3 GM/DL
GLUCOSE SERPL-MCNC: 163 MG/DL (ref 65–99)
GLUCOSE UR STRIP-MCNC: NEGATIVE MG/DL
HCT VFR BLD AUTO: 36.8 % (ref 34–46.6)
HGB BLD-MCNC: 12.6 G/DL (ref 12–15.9)
HGB UR QL STRIP.AUTO: NEGATIVE
HOLD SPECIMEN: NORMAL
HOLD SPECIMEN: NORMAL
HYALINE CASTS UR QL AUTO: ABNORMAL /LPF
IMM GRANULOCYTES # BLD AUTO: 0.03 10*3/MM3 (ref 0–0.05)
IMM GRANULOCYTES NFR BLD AUTO: 0.2 % (ref 0–0.5)
KETONES UR QL STRIP: NEGATIVE
LEUKOCYTE ESTERASE UR QL STRIP.AUTO: ABNORMAL
LIPASE SERPL-CCNC: 40 U/L (ref 13–60)
LYMPHOCYTES # BLD AUTO: 0.78 10*3/MM3 (ref 0.7–3.1)
LYMPHOCYTES NFR BLD AUTO: 6.2 % (ref 19.6–45.3)
MCH RBC QN AUTO: 30.5 PG (ref 26.6–33)
MCHC RBC AUTO-ENTMCNC: 34.2 G/DL (ref 31.5–35.7)
MCV RBC AUTO: 89.1 FL (ref 79–97)
MONOCYTES # BLD AUTO: 1.14 10*3/MM3 (ref 0.1–0.9)
MONOCYTES NFR BLD AUTO: 9.1 % (ref 5–12)
NEUTROPHILS NFR BLD AUTO: 10.56 10*3/MM3 (ref 1.7–7)
NEUTROPHILS NFR BLD AUTO: 84.3 % (ref 42.7–76)
NITRITE UR QL STRIP: NEGATIVE
NRBC BLD AUTO-RTO: 0 /100 WBC (ref 0–0.2)
PH UR STRIP.AUTO: 7 [PH] (ref 5–8)
PLATELET # BLD AUTO: 208 10*3/MM3 (ref 140–450)
PMV BLD AUTO: 9.8 FL (ref 6–12)
POTASSIUM SERPL-SCNC: 3.4 MMOL/L (ref 3.5–5.2)
PROT SERPL-MCNC: 6.5 G/DL (ref 6–8.5)
PROT UR QL STRIP: NEGATIVE
RBC # BLD AUTO: 4.13 10*6/MM3 (ref 3.77–5.28)
RBC # UR STRIP: ABNORMAL /HPF
REF LAB TEST METHOD: ABNORMAL
SODIUM SERPL-SCNC: 135 MMOL/L (ref 136–145)
SP GR UR STRIP: 1.01 (ref 1–1.03)
SQUAMOUS #/AREA URNS HPF: ABNORMAL /HPF
UROBILINOGEN UR QL STRIP: ABNORMAL
WBC # UR STRIP: ABNORMAL /HPF
WBC NRBC COR # BLD: 12.53 10*3/MM3 (ref 3.4–10.8)
WHOLE BLOOD HOLD COAG: NORMAL
WHOLE BLOOD HOLD SPECIMEN: NORMAL

## 2022-08-24 PROCEDURE — 85025 COMPLETE CBC W/AUTO DIFF WBC: CPT

## 2022-08-24 PROCEDURE — 99284 EMERGENCY DEPT VISIT MOD MDM: CPT

## 2022-08-24 PROCEDURE — 83690 ASSAY OF LIPASE: CPT

## 2022-08-24 PROCEDURE — 81001 URINALYSIS AUTO W/SCOPE: CPT | Performed by: EMERGENCY MEDICINE

## 2022-08-24 PROCEDURE — 76705 ECHO EXAM OF ABDOMEN: CPT

## 2022-08-24 PROCEDURE — C9803 HOPD COVID-19 SPEC COLLECT: HCPCS

## 2022-08-24 PROCEDURE — U0004 COV-19 TEST NON-CDC HGH THRU: HCPCS

## 2022-08-24 PROCEDURE — 83605 ASSAY OF LACTIC ACID: CPT

## 2022-08-24 PROCEDURE — U0005 INFEC AGEN DETEC AMPLI PROBE: HCPCS

## 2022-08-24 PROCEDURE — 36415 COLL VENOUS BLD VENIPUNCTURE: CPT

## 2022-08-24 PROCEDURE — 80053 COMPREHEN METABOLIC PANEL: CPT

## 2022-08-24 RX ORDER — ACETAMINOPHEN 325 MG/1
975 TABLET ORAL ONCE
Status: COMPLETED | OUTPATIENT
Start: 2022-08-24 | End: 2022-08-24

## 2022-08-24 RX ORDER — SODIUM CHLORIDE 0.9 % (FLUSH) 0.9 %
10 SYRINGE (ML) INJECTION AS NEEDED
Status: DISCONTINUED | OUTPATIENT
Start: 2022-08-24 | End: 2022-08-25 | Stop reason: HOSPADM

## 2022-08-24 RX ORDER — POTASSIUM CHLORIDE 750 MG/1
40 CAPSULE, EXTENDED RELEASE ORAL ONCE
Status: COMPLETED | OUTPATIENT
Start: 2022-08-24 | End: 2022-08-24

## 2022-08-24 RX ADMIN — ACETAMINOPHEN 975 MG: 325 TABLET ORAL at 23:54

## 2022-08-24 RX ADMIN — POTASSIUM CHLORIDE 40 MEQ: 750 CAPSULE, EXTENDED RELEASE ORAL at 22:16

## 2022-08-25 ENCOUNTER — TELEPHONE (OUTPATIENT)
Dept: INTERNAL MEDICINE | Facility: CLINIC | Age: 77
End: 2022-08-25

## 2022-08-25 ENCOUNTER — PATIENT OUTREACH (OUTPATIENT)
Dept: CASE MANAGEMENT | Facility: OTHER | Age: 77
End: 2022-08-25

## 2022-08-25 ENCOUNTER — TELEPHONE (OUTPATIENT)
Dept: FAMILY MEDICINE CLINIC | Facility: CLINIC | Age: 77
End: 2022-08-25

## 2022-08-25 ENCOUNTER — OFFICE VISIT (OUTPATIENT)
Dept: INTERNAL MEDICINE | Facility: CLINIC | Age: 77
End: 2022-08-25

## 2022-08-25 VITALS
OXYGEN SATURATION: 93 % | HEART RATE: 78 BPM | HEIGHT: 62 IN | BODY MASS INDEX: 34.24 KG/M2 | WEIGHT: 186.07 LBS | DIASTOLIC BLOOD PRESSURE: 73 MMHG | RESPIRATION RATE: 18 BRPM | SYSTOLIC BLOOD PRESSURE: 144 MMHG | TEMPERATURE: 98.9 F

## 2022-08-25 VITALS
BODY MASS INDEX: 30.18 KG/M2 | HEART RATE: 98 BPM | SYSTOLIC BLOOD PRESSURE: 132 MMHG | DIASTOLIC BLOOD PRESSURE: 78 MMHG | OXYGEN SATURATION: 100 % | WEIGHT: 164 LBS | HEIGHT: 62 IN

## 2022-08-25 DIAGNOSIS — K80.20 GALLSTONES: ICD-10-CM

## 2022-08-25 DIAGNOSIS — R11.10 VOMITING, UNSPECIFIED VOMITING TYPE, UNSPECIFIED WHETHER NAUSEA PRESENT: ICD-10-CM

## 2022-08-25 DIAGNOSIS — R10.9 FLANK PAIN: ICD-10-CM

## 2022-08-25 DIAGNOSIS — R11.0 NAUSEA: Primary | ICD-10-CM

## 2022-08-25 DIAGNOSIS — K21.9 GASTROESOPHAGEAL REFLUX DISEASE WITHOUT ESOPHAGITIS: ICD-10-CM

## 2022-08-25 DIAGNOSIS — R11.0 NAUSEA: ICD-10-CM

## 2022-08-25 DIAGNOSIS — R19.7 DIARRHEA, UNSPECIFIED TYPE: ICD-10-CM

## 2022-08-25 DIAGNOSIS — N30.00 ACUTE CYSTITIS WITHOUT HEMATURIA: Primary | ICD-10-CM

## 2022-08-25 LAB — SARS-COV-2 RNA PNL SPEC NAA+PROBE: NOT DETECTED

## 2022-08-25 PROCEDURE — 99213 OFFICE O/P EST LOW 20 MIN: CPT

## 2022-08-25 PROCEDURE — 81003 URINALYSIS AUTO W/O SCOPE: CPT

## 2022-08-25 RX ORDER — ONDANSETRON 4 MG/1
4 TABLET, ORALLY DISINTEGRATING ORAL EVERY 8 HOURS PRN
Qty: 15 TABLET | Refills: 0 | Status: SHIPPED | OUTPATIENT
Start: 2022-08-25 | End: 2022-08-25

## 2022-08-25 RX ORDER — ONDANSETRON 4 MG/1
4 TABLET, FILM COATED ORAL EVERY 8 HOURS PRN
Qty: 30 TABLET | Refills: 0 | Status: SHIPPED | OUTPATIENT
Start: 2022-08-25

## 2022-08-25 RX ORDER — OMEPRAZOLE 40 MG/1
40 CAPSULE, DELAYED RELEASE ORAL DAILY
Qty: 30 CAPSULE | Refills: 0 | Status: SHIPPED | OUTPATIENT
Start: 2022-08-25 | End: 2022-09-24

## 2022-08-25 RX ORDER — CEPHALEXIN 500 MG/1
500 CAPSULE ORAL 3 TIMES DAILY
Qty: 21 CAPSULE | Refills: 0 | Status: SHIPPED | OUTPATIENT
Start: 2022-08-25 | End: 2022-09-21

## 2022-08-25 RX ORDER — MULTIPLE VITAMINS W/ MINERALS TAB 9MG-400MCG
1 TAB ORAL DAILY
COMMUNITY

## 2022-08-25 NOTE — ASSESSMENT & PLAN NOTE
Right upper quadrant pain and midepigastric pain.  Patient admits to belching.  Patient does have gallstones possibly.  Plan: Start Prilosec 40 mg every morning.  Follow-up with gastro as scheduled.

## 2022-08-25 NOTE — OUTREACH NOTE
AMBULATORY CASE MANAGEMENT NOTE    Name and Relationship of Patient/Support Person: Kelsey Durand - Self    CCM Interim Update        Per ER report the patient had 70+ rating of being admitted to the ER/ hospital. I reached out to the patient and explained the CCM process and she agreed. Patient was informed that she was under no obligation and she could stop at anytime if she wished. Patient consented . Patient stated that she was not feeling well and was still nauseated and still experiencing diarrhea as well as flank pain . I established a ER F/U with PCP for next date @ [id number] and she agreed, after conversation patient stated that she did not have a gastro MD and was open to establishing care with a GI MD . I asked if I could assist with the process and she agreed. I stated I would reach back out when the appointment was made and she agreed. My contact information was provided and patient was informed not to use this number for emergency situations but to call 911 and the patient verbalized understanding. Meds were discussed and no changes or refills required at this time . Patient expressed no other questions at this time .     Electronic message sent to PCP for GI referral request.       Education Documentation  No documentation found.        JEFFERY YOUNGBLOOD  Ambulatory Case Management    8/25/2022, 10:02 EDT

## 2022-08-25 NOTE — OUTREACH NOTE
AMBULATORY CASE MANAGEMENT NOTE    Name and Relationship of Patient/Support Person: Kelsey Durand - Self      Called and left patient message requested return call .    Education Documentation  No documentation found.        JEFFERY YOUNGBLOOD  Ambulatory Case Management    8/25/2022, 09:30 EDT

## 2022-08-25 NOTE — ASSESSMENT & PLAN NOTE
Patient does have gallstones.  She was seen in the emergency department due to complaints of nausea, vomiting, fatigue and weakness.  She states that she ate a quarter pounder with cheese and then a few hours later she started vomiting.  She did have a right upper quadrant ultrasound and was negative for acute cholecystitis but that shows possible gallstones.  She did have positive Mcduffie sign on the ultrasound.  She was discharged home with a gastroenterology referral.  She took some Phenergan but it did make her drowsy.  Plan: I will give her Prilosec and Zofran for nausea.  Follow up with gastroenterology as scheduled next week.

## 2022-08-25 NOTE — ASSESSMENT & PLAN NOTE
Patient admits to urinary frequency, right vaginal flank pain, urgency, and incontinent episodes.  She had a urinalysis for bacteria in the emergency department but must not of been symptomatic at the time.  White count was elevated.  Plan: I will recollect a urinalysis and send off for culture.  I will go ahead and start patient on Keflex 3 times daily.

## 2022-08-25 NOTE — TELEPHONE ENCOUNTER
Caller: LAKISHAMARINA    Relationship: Emergency Contact    Best call back number:     What is the best time to reach you: ANYTIME     Who are you requesting to speak with (clinical staff, provider,  specific staff member): YOU           What was the call regarding:     MARINA SAID SHE WAS WAITING ON A CALL FOR YOU. SHE SAID YOU WAS HOLDING A 3:45 APPOINTMENT TODAY FOR THE PATIENT, BUT SHE HAS NOT RECEIVED A CALL. SHE HAS SCHEDULED AN APPOINTMENT AT 3 PM TODAY WITH JAYLEEN, BUT IS WANTING A  CALL FROM YOU.     PLEASE CALL BACK       Do you require a callback: YES

## 2022-08-25 NOTE — PROGRESS NOTES
Chief Complaint  Follow-up (Patient was seen in ED last Friday and want to go over those labs. Today patient is feeling weak/fatigue and having trouble going to the bathroom. Patient also having incontinence and low back pain.)    SUBJECTIVE  Kelsey Durand presents to [facility] with complaints on nausea, vomiting, fatigue and weakness.  Pt went to ED yesterday. Pt states that she ate a quarter pounder with cheese and a few hours later she started vomiting.   RUQ ultrasound negative for acute cholecystitis but did show some possible gallstones with positive Mcduffie's sign.  She was discharged home with a referral to gastroenterology.    She took phenergan and it did make her sleepy. She does admit to some occasional gerd.    Pt admits to urinary frequency, urgency, incontinence episodes. UA in ED shows bacteria.    History of Present Illness  Past Medical History:   Diagnosis Date   • Allergic rhinitis due to pollen    • Atrophy of thyroid (acquired)    • Chronic fatigue    • Colitis    • Disease of thyroid gland    • DM (diabetes mellitus) (HCC)    • Fracture, foot Apr 4 2022    Fell in yard Mon morning. Sorta loss balance. Right foot on side   • Gallstones    • Hypercholesterolemia    • Hyperlipidemia    • Hypertension    • Hypothyroid    • Impaired fasting glucose    • Mixed hyperlipidemia    • Thyroid nodule    • Unspecified osteoarthritis, unspecified site    • Vitamin D deficiency, unspecified       History reviewed. No pertinent family history.   Past Surgical History:   Procedure Laterality Date   • CATARACT EXTRACTION      12/14/2011 Left Cataract Surgery 11/30/2011 right eye cataract surgery   • COLONOSCOPY      11/2020 colonoscopy   • LEEP  2000   • OTHER SURGICAL HISTORY      FNA        Current Outpatient Medications:   •  acetaminophen (TYLENOL) 500 MG tablet, Take 500 mg by mouth Every 6 (Six) Hours As Needed for Mild Pain ., Disp: , Rfl:   •  escitalopram  "(Lexapro) 5 MG tablet, Take 1 tablet by mouth Daily., Disp: 90 tablet, Rfl: 1  •  fluticasone (FLONASE) 50 MCG/ACT nasal spray, 2 sprays by Each Nare route As Needed., Disp: , Rfl:   •  levothyroxine (SYNTHROID, LEVOTHROID) 100 MCG tablet, Take 1 tablet by mouth Every Morning., Disp: 90 tablet, Rfl: 1  •  metoprolol succinate XL (TOPROL-XL) 25 MG 24 hr tablet, Take 1 tablet by mouth Daily., Disp: 90 tablet, Rfl: 1  •  multivitamin with minerals (MULTIVITAL PO), Take 1 tablet by mouth Daily., Disp: , Rfl:   •  psyllium (METAMUCIL) 0.52 g capsule, Take 0.52 g by mouth Daily., Disp: , Rfl:   •  simvastatin (ZOCOR) 10 MG tablet, Take 1 tablet by mouth Every Other Day., Disp: 90 tablet, Rfl: 1  •  cephalexin (Keflex) 500 MG capsule, Take 1 capsule by mouth 3 (Three) Times a Day., Disp: 21 capsule, Rfl: 0  •  omeprazole (priLOSEC) 40 MG capsule, Take 1 capsule by mouth Daily for 30 days., Disp: 30 capsule, Rfl: 0  •  ondansetron (Zofran) 4 MG tablet, Take 1 tablet by mouth Every 8 (Eight) Hours As Needed for Nausea or Vomiting., Disp: 30 tablet, Rfl: 0  No current facility-administered medications for this visit.    OBJECTIVE  Vital Signs:   /78 (BP Location: Left arm, Patient Position: Sitting, Cuff Size: Large Adult)   Pulse 98   Ht 157.5 cm (62\")   Wt 74.4 kg (164 lb)   SpO2 100%   BMI 30.00 kg/m²    Estimated body mass index is 30 kg/m² as calculated from the following:    Height as of this encounter: 157.5 cm (62\").    Weight as of this encounter: 74.4 kg (164 lb).     Wt Readings from Last 3 Encounters:   08/25/22 74.4 kg (164 lb)   08/24/22 84.4 kg (186 lb 1.1 oz)   07/20/22 79.4 kg (175 lb)     BP Readings from Last 3 Encounters:   08/25/22 132/78   08/25/22 144/73   06/21/22 144/79       Physical Exam  Constitutional:       Appearance: Normal appearance.   HENT:      Head: Normocephalic and atraumatic.   Eyes:      Extraocular Movements: Extraocular movements intact.      Conjunctiva/sclera: " Conjunctivae normal.   Cardiovascular:      Rate and Rhythm: Normal rate.      Pulses: Normal pulses.   Pulmonary:      Effort: Pulmonary effort is normal.      Breath sounds: Normal breath sounds. No wheezing.   Abdominal:      General: Bowel sounds are normal.      Palpations: Abdomen is soft.      Tenderness: There is abdominal tenderness (Right upper quadrant, mid epigastric).   Musculoskeletal:      Cervical back: Normal range of motion.   Skin:     General: Skin is warm and dry.   Neurological:      Mental Status: She is alert and oriented to person, place, and time.   Psychiatric:         Mood and Affect: Mood normal.         Behavior: Behavior normal.         Thought Content: Thought content normal.         Judgment: Judgment normal.          Result Review    CMP    CMP 2/1/22 6/13/22 8/24/22   Glucose 87 96 163 (A)   BUN 11 12 12   Creatinine 0.70 0.68 0.71   eGFR Non African Am 81     Sodium 136 137 135 (A)   Potassium 4.5 4.4 3.4 (A)   Chloride 100 101 101   Calcium 9.6 9.0 8.7   Albumin  4.00 3.50   Total Bilirubin  0.8 1.6 (A)   Alkaline Phosphatase  50 45   AST (SGOT)  21 17   ALT (SGPT)  17 14   (A) Abnormal value            CBC w/diff    CBC w/Diff 10/9/21 10/12/21 8/24/22   WBC 7.33 5.76 12.53 (A)   RBC 4.26 4.18 4.13   Hemoglobin 12.8 12.4 12.6   Hematocrit 38.8 39.2 36.8   MCV 91.1 93.8 89.1   MCH 30.0 29.7 30.5   MCHC 33.0 31.6 34.2   RDW 13.8 13.1 14.3   Platelets 214 296 208   Neutrophil Rel % 69.2 59.1 84.3 (A)   Immature Granulocyte Rel % 0.4 0.7 (A) 0.2   Lymphocyte Rel % 17.2 (A) 22.9 6.2 (A)   Monocyte Rel % 10.8 10.9 9.1   Eosinophil Rel % 1.9 5.0 0.0 (A)   Basophil Rel % 0.5 1.4 0.2   (A) Abnormal value            Lab Results   Component Value Date    RBCUA 0-2 (A) 08/24/2022    WBCUA 21-30 (A) 08/24/2022    BACTERIA 1+ (A) 08/24/2022    SQUAMEPIUA 0-2 08/24/2022    HYALCASTU 0-2 08/24/2022    METHODOLOGY Automated Microscopy 08/24/2022    COLORU Yellow 08/24/2022    CLARITYU Clear  08/24/2022    PHUR 7.0 08/24/2022    SPECGRAVUR 1.007 08/24/2022    GLUCOSEU Negative 08/24/2022    KETONESU Negative 08/24/2022    BILIRUBINUR Negative 08/24/2022    BLOODU Negative 08/24/2022    PROTEINUA Negative 08/24/2022    LEUKOCYTESUR Moderate (2+) (A) 08/24/2022    NITRITEU Negative 08/24/2022    UROBILINOGEN 1.0 E.U./dL 08/24/2022       US Gallbladder    Result Date: 8/24/2022    There are possible gallstones.  A positive sonographic Mcduffie's sign was reported.  No definite gallbladder wall thickening or pericholecystic fluid is seen to suggest acute cholecystitis by ultrasound examination.  No biliary ductal dilatation is appreciated.  Please see above comments for further detail.     COMMENT:  Part of this note is an electronic transcription of spoken language to printed text. The electronic translation/transcription may permit erroneous, or at times, nonsensical (or even sensical) words or phrases to be inadvertently transcribed or omitted; this  has reviewed the note for such errors (as well as additional errors); however, some may still exist.  PAM ABDI JR, MD       Electronically Signed and Approved By: PAM ABDI JR, MD on 8/24/2022 at 23:52                  The above data has been reviewed by RAFAEL Davalos 08/25/2022 15:22 EDT.          Patient Care Team:  Chris Elder MD as PCP - General (Internal Medicine)  Zion Howe MA as Ambulatory  Assistant ([facility])           ASSESSMENT & PLAN    Diagnoses and all orders for this visit:    1. Acute cystitis without hematuria (Primary)  Assessment & Plan:  Patient admits to urinary frequency, right vaginal flank pain, urgency, and incontinent episodes.  She had a urinalysis for bacteria in the emergency department but must not of been symptomatic at the time.  White count was elevated.  Plan: I will recollect a urinalysis and send off for culture.  I will go ahead and start patient on Keflex 3 times  daily.      2. Gastroesophageal reflux disease without esophagitis  Assessment & Plan:  Right upper quadrant pain and midepigastric pain.  Patient admits to belching.  Patient does have gallstones possibly.  Plan: Start Prilosec 40 mg every morning.  Follow-up with gastro as agile.      3. Nausea  -     ondansetron (Zofran) 4 MG tablet; Take 1 tablet by mouth Every 8 (Eight) Hours As Needed for Nausea or Vomiting.  Dispense: 30 tablet; Refill: 0    4. Gallstones  Assessment & Plan:  Patient does have gallstones.  She was seen in the emergency department due to complaints of nausea, vomiting, fatigue and weakness.  She states that she ate a quarter pounder with cheese and then a few hours later she started vomiting.  She did have a right upper quadrant ultrasound and was negative for acute cholecystitis but that shows possible gallstones.  She did have positive Mcduffie sign on the ultrasound.  She was discharged home with a gastroenterology referral.  She took some Phenergan but it did make her drowsy.  Plan: I will give her Prilosec and Zofran for nausea.  Low up with gastroenterology as scheduled next week.      Other orders  -     omeprazole (priLOSEC) 40 MG capsule; Take 1 capsule by mouth Daily for 30 days.  Dispense: 30 capsule; Refill: 0  -     cephalexin (Keflex) 500 MG capsule; Take 1 capsule by mouth 3 (Three) Times a Day.  Dispense: 21 capsule; Refill: 0       Tobacco Use: Low Risk    • Smoking Tobacco Use: Never Smoker   • Smokeless Tobacco Use: Never Used       Follow Up     No follow-ups on file.      Patient was given instructions and counseling regarding her condition or for health maintenance advice. Please see specific information pulled into the AVS if appropriate.   I have reviewed information obtained and documented by others and I have confirmed the accuracy of this documented note.    RAFAEL Davalos

## 2022-08-26 LAB
BILIRUB BLD-MCNC: NEGATIVE MG/DL
CLARITY, POC: CLEAR
COLOR UR: ABNORMAL
EXPIRATION DATE: ABNORMAL
GLUCOSE UR STRIP-MCNC: NEGATIVE MG/DL
KETONES UR QL: ABNORMAL
LEUKOCYTE EST, POC: ABNORMAL
Lab: ABNORMAL
NITRITE UR-MCNC: NEGATIVE MG/ML
PH UR: 6 [PH] (ref 5–8)
PROT UR STRIP-MCNC: ABNORMAL MG/DL
RBC # UR STRIP: ABNORMAL /UL
SP GR UR: 1.02 (ref 1–1.03)
UROBILINOGEN UR QL: NORMAL

## 2022-08-26 PROCEDURE — 87186 SC STD MICRODIL/AGAR DIL: CPT

## 2022-08-26 PROCEDURE — 87086 URINE CULTURE/COLONY COUNT: CPT

## 2022-08-26 PROCEDURE — 87077 CULTURE AEROBIC IDENTIFY: CPT

## 2022-08-26 NOTE — PROGRESS NOTES
Chief Complaint   RUQ abdominal pain, nausea, and vomiting     History of Present Illness       Kelsey Durand is a 77 y.o. female who presents to [facility] for follow-up with a new onset of nausea, vomiting, reflux, right upper quadrant pain, gallstones and urinary tract infection.  Patient reports Tuesday night she had a quarter pounder and then began feeling nauseous shortly afterwards vomiting.  She reports Wednesday feeling nauseous and having right sided abdominal pain.  Patient did not go to the emergency department but did call EMS who came out to evaluate her.  She reports Tuesday was the last time that she vomited and it was a brownish color vomit.  She reports being seen by primary care who treated her for urinary tract infection and she obtained her antibiotics on Friday and began them shortly after.  She reports normal bowel movements without melena or hematochezia.  Patient denies fever,weight loss, night sweats, melena, hematochezia, hematemesis.    Labs performed on 08/24/2022 see below.    Gallbladder ultrasound performed on 08/24/2022 possible gallstones.  Positive Mcduffie sign.    Colonoscopy: Review of the patient's most recent colonoscopy performed by Dr. Streeter on 11/7/2019 nonbleeding diverticula sigmoid colon, grade 1 internal hemorrhoids, single polyp in the ascending colon, single sessile polyp in the rectum.  Repeat colonoscopy 5 years.    Patient has never had an EGD.     Results       Result Review :       CMP    CMP 2/1/22 6/13/22 8/24/22   Glucose 87 96 163 (A)   BUN 11 12 12   Creatinine 0.70 0.68 0.71   eGFR Non African Am 81     Sodium 136 137 135 (A)   Potassium 4.5 4.4 3.4 (A)   Chloride 100 101 101   Calcium 9.6 9.0 8.7   Albumin  4.00 3.50   Total Bilirubin  0.8 1.6 (A)   Alkaline Phosphatase  50 45   AST (SGOT)  21 17   ALT (SGPT)  17 14   (A) Abnormal value            CBC    CBC 10/9/21 10/12/21 8/24/22   WBC 7.33 5.76 12.53 (A)   RBC  4.26 4.18 4.13   Hemoglobin 12.8 12.4 12.6   Hematocrit 38.8 39.2 36.8   MCV 91.1 93.8 89.1   MCH 30.0 29.7 30.5   MCHC 33.0 31.6 34.2   RDW 13.8 13.1 14.3   Platelets 214 296 208   (A) Abnormal value                          Past Medical History       Past Medical History:   Diagnosis Date   • Allergic rhinitis due to pollen    • Atrophy of thyroid (acquired)    • Chronic fatigue    • Colitis    • Disease of thyroid gland    • DM (diabetes mellitus) (HCC)    • Fracture, foot Apr 4 2022    Fell in yard Mon morning. Sorta loss balance. Right foot on side   • Gallstones    • Hypercholesterolemia    • Hyperlipidemia    • Hypertension    • Hypothyroid    • Impaired fasting glucose    • Mixed hyperlipidemia    • Thyroid nodule    • Unspecified osteoarthritis, unspecified site    • Vitamin D deficiency, unspecified        Past Surgical History:   Procedure Laterality Date   • CATARACT EXTRACTION      12/14/2011 Left Cataract Surgery 11/30/2011 right eye cataract surgery   • COLONOSCOPY      11/2020 colonoscopy   • LEEP  2000   • OTHER SURGICAL HISTORY      FNA         Current Outpatient Medications:   •  acetaminophen (TYLENOL) 500 MG tablet, Take 500 mg by mouth Every 6 (Six) Hours As Needed for Mild Pain ., Disp: , Rfl:   •  cephalexin (Keflex) 500 MG capsule, Take 1 capsule by mouth 3 (Three) Times a Day., Disp: 21 capsule, Rfl: 0  •  escitalopram (Lexapro) 5 MG tablet, Take 1 tablet by mouth Daily., Disp: 90 tablet, Rfl: 1  •  fluticasone (FLONASE) 50 MCG/ACT nasal spray, 2 sprays by Each Nare route As Needed., Disp: , Rfl:   •  levothyroxine (SYNTHROID, LEVOTHROID) 100 MCG tablet, Take 1 tablet by mouth Every Morning., Disp: 90 tablet, Rfl: 1  •  metoprolol succinate XL (TOPROL-XL) 25 MG 24 hr tablet, Take 1 tablet by mouth Daily., Disp: 90 tablet, Rfl: 1  •  multivitamin with minerals tablet tablet, Take 1 tablet by mouth Daily., Disp: , Rfl:   •  omeprazole (priLOSEC) 40 MG capsule, Take 1 capsule by mouth Daily  "for 30 days., Disp: 30 capsule, Rfl: 0  •  ondansetron (Zofran) 4 MG tablet, Take 1 tablet by mouth Every 8 (Eight) Hours As Needed for Nausea or Vomiting., Disp: 30 tablet, Rfl: 0  •  psyllium (METAMUCIL) 0.52 g capsule, Take 0.52 g by mouth Daily., Disp: , Rfl:   •  simvastatin (ZOCOR) 10 MG tablet, Take 1 tablet by mouth Every Other Day., Disp: 90 tablet, Rfl: 1     Allergies   Allergen Reactions   • Sulfa Antibiotics Rash and Hives       Family History   Problem Relation Age of Onset   • Colon cancer Neg Hx         Social History     Social History Narrative   • Not on file       Objective     Vital Signs:   /71 (BP Location: Right arm, Patient Position: Sitting, Cuff Size: Adult)   Pulse 73   Ht 157.5 cm (62\")   Wt 83 kg (182 lb 14.4 oz)   SpO2 96%   BMI 33.45 kg/m²       Physical Exam  Constitutional:       General: She is not in acute distress.     Appearance: Normal appearance. She is well-developed and normal weight.   Eyes:      Conjunctiva/sclera: Conjunctivae normal.      Pupils: Pupils are equal, round, and reactive to light.      Visual Fields: Right eye visual fields normal and left eye visual fields normal.   Cardiovascular:      Rate and Rhythm: Normal rate and regular rhythm.      Heart sounds: Normal heart sounds.   Pulmonary:      Effort: Pulmonary effort is normal. No retractions.      Breath sounds: Normal breath sounds and air entry.      Comments: Inspection of chest: normal appearance  Abdominal:      General: Bowel sounds are normal.      Palpations: Abdomen is soft.      Tenderness: There is no abdominal tenderness.      Comments: No appreciable hepatosplenomegaly   Musculoskeletal:      Cervical back: Neck supple.      Right lower leg: No edema.      Left lower leg: No edema.   Lymphadenopathy:      Cervical: No cervical adenopathy.   Skin:     Findings: No lesion.      Comments: Turgor normal   Neurological:      Mental Status: She is alert and oriented to person, place, and " time.   Psychiatric:         Mood and Affect: Mood and affect normal.           Assessment & Plan          Assessment and Plan    Diagnoses and all orders for this visit:    1. Right upper quadrant abdominal pain (Primary)    2. Nausea    3. Nausea and vomiting, unspecified vomiting type    4. Gastroesophageal reflux disease, unspecified whether esophagitis present      77-year-old female presenting the office today  for follow-up with a new onset of nausea, vomiting, reflux, right upper quadrant pain, gallstones and urinary tract infection.  I have recommended that the patient undergo further evaluation with an EGD.  I have discussed this procedure in detail with the patient.  I have discussed the risks, benefits and alternatives.  I have discussed the risk of anesthesia, bleeding and perforation.  Patient understands these risks, benefits and alternatives and wishes to proceed.  I will schedule her at her earliest convenience.  Patient will continue omeprazole as prescribed.  Patient will complete antibiotic course for urinary tract infection.  Patient has had improvement in her symptoms since being treated for urinary tract infection.  We have reviewed right upper quadrant ultrasound which displayed gallstones without acute cholecystitis.  Patient will follow-up in office after EGD.  Patient agreeable to this plan will call with any questions or concerns.            Follow Up       Follow Up   Return for Follow up after endoscopy in office.  Patient was given instructions and counseling regarding her condition or for health maintenance advice. Please see specific information pulled into the AVS if appropriate.

## 2022-08-28 LAB — BACTERIA SPEC AEROBE CULT: ABNORMAL

## 2022-08-29 ENCOUNTER — OFFICE VISIT (OUTPATIENT)
Dept: GASTROENTEROLOGY | Facility: CLINIC | Age: 77
End: 2022-08-29

## 2022-08-29 ENCOUNTER — PREP FOR SURGERY (OUTPATIENT)
Dept: OTHER | Facility: HOSPITAL | Age: 77
End: 2022-08-29

## 2022-08-29 VITALS
BODY MASS INDEX: 33.66 KG/M2 | SYSTOLIC BLOOD PRESSURE: 131 MMHG | WEIGHT: 182.9 LBS | OXYGEN SATURATION: 96 % | DIASTOLIC BLOOD PRESSURE: 71 MMHG | HEIGHT: 62 IN | HEART RATE: 73 BPM

## 2022-08-29 DIAGNOSIS — R10.11 RIGHT UPPER QUADRANT ABDOMINAL PAIN: Primary | ICD-10-CM

## 2022-08-29 DIAGNOSIS — R11.2 NAUSEA AND VOMITING, UNSPECIFIED VOMITING TYPE: ICD-10-CM

## 2022-08-29 DIAGNOSIS — K21.9 GASTROESOPHAGEAL REFLUX DISEASE, UNSPECIFIED WHETHER ESOPHAGITIS PRESENT: ICD-10-CM

## 2022-08-29 DIAGNOSIS — R11.0 NAUSEA: ICD-10-CM

## 2022-08-29 PROCEDURE — 99214 OFFICE O/P EST MOD 30 MIN: CPT | Performed by: NURSE PRACTITIONER

## 2022-08-29 NOTE — PATIENT INSTRUCTIONS
Food Choices for Gastroesophageal Reflux Disease, Adult  When you have gastroesophageal reflux disease (GERD), the foods you eat and your eating habits are very important. Choosing the right foods can help ease your discomfort. Think about working with a food expert (dietitian) to help you make good choices.  What are tips for following this plan?  Reading food labels  Look for foods that are low in saturated fat. Foods that may help with your symptoms include:  Foods that have less than 5% of daily value (DV) of fat.  Foods that have 0 grams of trans fat.  Cooking  Do not murcia your food.  Cook your food by baking, steaming, grilling, or broiling. These are all methods that do not need a lot of fat for cooking.  To add flavor, try to use herbs that are low in spice and acidity.  Meal planning    Choose healthy foods that are low in fat, such as:  Fruits and vegetables.  Whole grains.  Low-fat dairy products.  Lean meats, fish, and poultry.  Eat small meals often instead of eating 3 large meals each day. Eat your meals slowly in a place where you are relaxed. Avoid bending over or lying down until 2-3 hours after eating.  Limit high-fat foods such as fatty meats or fried foods.  Limit your intake of fatty foods, such as oils, butter, and shortening.  Avoid the following as told by your doctor:  Foods that cause symptoms. These may be different for different people. Keep a food diary to keep track of foods that cause symptoms.  Alcohol.  Drinking a lot of liquid with meals.  Eating meals during the 2-3 hours before bed.    Lifestyle  Stay at a healthy weight. Ask your doctor what weight is healthy for you. If you need to lose weight, work with your doctor to do so safely.  Exercise for at least 30 minutes on 5 or more days each week, or as told by your doctor.  Wear loose-fitting clothes.  Do not smoke or use any products that contain nicotine or tobacco. If you need help quitting, ask your doctor.  Sleep with the head  of your bed higher than your feet. Use a wedge under the mattress or blocks under the bed frame to raise the head of the bed.  Chew sugar-free gum after meals.  What foods should eat?    Eat a healthy, well-balanced diet of fruits, vegetables, whole grains, low-fat dairy products, lean meats, fish, and poultry. Each person is different.  Foods that may cause symptoms in one person may not cause any symptoms in another person. Work with your doctor to find foods that are safe for you.  The items listed above may not be a complete list of what you can eat and drink. Contact a food expert for more options.  What foods should I avoid?  Limiting some of these foods may help in managing the symptoms of GERD. Everyone is different. Talk with a food expert or your doctor to help you find the exact foods to avoid, if any.  Fruits  Any fruits prepared with added fat. Any fruits that cause symptoms. For some people, this may include citrus fruits, such as oranges, grapefruit, pineapple, and brandi.  Vegetables  Deep-fried vegetables. French fries. Any vegetables prepared with added fat. Any vegetables that cause symptoms. For some people, this may include tomatoes and tomato products, chili peppers, onions and garlic, and horseradish.  Grains  Pastries or quick breads with added fat.  Meats and other proteins  High-fat meats, such as fatty beef or pork, hot dogs, ribs, ham, sausage, salami, and daniel. Fried meat or protein, including fried fish and fried chicken. Nuts and nut butters, in large amounts.  Dairy  Whole milk and chocolate milk. Sour cream. Cream. Ice cream. Cream cheese. Milkshakes.  Fats and oils  Butter. Margarine. Shortening. Ghee.  Beverages  Coffee and tea, with or without caffeine. Carbonated beverages. Sodas. Energy drinks. Fruit juice made with acidic fruits, such as orange or grapefruit. Tomato juice. Alcoholic drinks.  Sweets and desserts  Chocolate and cocoa. Donuts.  Seasonings and condiments  Pepper.  Peppermint and spearmint. Added salt. Any condiments, herbs, or seasonings that cause symptoms. For some people, this may include medel, hot sauce, or vinegar-based salad dressings.  The items listed above may not be a complete list of what you should not eat and drink. Contact a food expert for more options.  Questions to ask your doctor  Diet and lifestyle changes are often the first steps that are taken to manage symptoms of GERD. If diet and lifestyle changes do not help, talk with your doctor about taking medicines.  Where to find more information  [employer] G[employer]: aboutgerd.org  Summary  When you have GERD, food and lifestyle choices are very important in easing your symptoms.  Eat small meals often instead of 3 large meals a day. Eat your meals slowly and in a place where you are relaxed.  Avoid bending over or lying down until 2-3 hours after eating.  Limit high-fat foods such as fatty meats or fried foods.  This information is not intended to replace advice given to you by your health care provider. Make sure you discuss any questions you have with your health care provider.  Document Revised: 06/28/2021 Document Reviewed: 06/28/2021  Elsevier Patient Education © 2021 [employer]

## 2022-08-30 ENCOUNTER — TELEPHONE (OUTPATIENT)
Dept: INTERNAL MEDICINE | Facility: CLINIC | Age: 77
End: 2022-08-30

## 2022-08-30 NOTE — TELEPHONE ENCOUNTER
PT called & PT thinks she has Shingles. She is wanting to get tested for it. Also PT has a bad UTI & she isn't feeling good. What should PT do?

## 2022-08-30 NOTE — PROGRESS NOTES
Chief Complaint  shingles? (The patient thinks she may have shingles. She states it burns and hurts. On the right side of her abdomen. She has had all her shots. ) and Urinary Tract Infection (The patient is currently on an antibiotic for a uti and she states she thinks she's getting better but she still has urgency in the mornings. )  Subjective        History of Present Illness  Kelsey Durand is a 77 y.o. female who presents to [facility] for complaint of rash to right abdomin that started 2 days ago.  The rash  is described as burinng pain and itches.  She had pain 1 week ago to her right upper abdomen and was seen in the emergency department for work-up.  They told her was not her gallbladder but that she had a urinary tract infection.  The patient reports that she had been confused.  No one mentioned the rash that day.    Past Medical History:   Diagnosis Date   • Allergic rhinitis due to pollen    • Atrophy of thyroid (acquired)    • Chronic fatigue    • Colitis    • Disease of thyroid gland    • DM (diabetes mellitus) (HCC)    • Fracture, foot Apr 4 2022    Fell in yard Mon morning. Sorta loss balance. Right foot on side   • Gallstones    • Hypercholesterolemia    • Hyperlipidemia    • Hypertension    • Hypothyroid    • Impaired fasting glucose    • Mixed hyperlipidemia    • Thyroid nodule    • Unspecified osteoarthritis, unspecified site    • Vitamin D deficiency, unspecified         Past Surgical History:   Procedure Laterality Date   • CATARACT EXTRACTION      12/14/2011 Left Cataract Surgery 11/30/2011 right eye cataract surgery   • COLONOSCOPY      11/2020 colonoscopy   • LEEP  2000   • OTHER SURGICAL HISTORY      FNA        Allergies   Allergen Reactions   • Sulfa Antibiotics Rash and Hives          Current Outpatient Medications:   •  acetaminophen (TYLENOL) 500 MG tablet, Take 500 mg by mouth Every 6 (Six) Hours As Needed for Mild Pain ., Disp: , Rfl:   •   "cephalexin (Keflex) 500 MG capsule, Take 1 capsule by mouth 3 (Three) Times a Day., Disp: 21 capsule, Rfl: 0  •  escitalopram (Lexapro) 5 MG tablet, Take 1 tablet by mouth Daily., Disp: 90 tablet, Rfl: 1  •  fluticasone (FLONASE) 50 MCG/ACT nasal spray, 2 sprays by Each Nare route As Needed., Disp: , Rfl:   •  levothyroxine (SYNTHROID, LEVOTHROID) 100 MCG tablet, Take 1 tablet by mouth Every Morning., Disp: 90 tablet, Rfl: 1  •  metoprolol succinate XL (TOPROL-XL) 25 MG 24 hr tablet, Take 1 tablet by mouth Daily., Disp: 90 tablet, Rfl: 1  •  multivitamin with minerals tablet tablet, Take 1 tablet by mouth Daily., Disp: , Rfl:   •  omeprazole (priLOSEC) 40 MG capsule, Take 1 capsule by mouth Daily for 30 days., Disp: 30 capsule, Rfl: 0  •  ondansetron (Zofran) 4 MG tablet, Take 1 tablet by mouth Every 8 (Eight) Hours As Needed for Nausea or Vomiting., Disp: 30 tablet, Rfl: 0  •  psyllium (METAMUCIL) 0.52 g capsule, Take 0.52 g by mouth Daily., Disp: , Rfl:   •  simvastatin (ZOCOR) 10 MG tablet, Take 1 tablet by mouth Every Other Day., Disp: 90 tablet, Rfl: 1    Objective   /70 (BP Location: Right arm, Patient Position: Sitting, Cuff Size: Large Adult)   Pulse 79   Temp 97.1 °F (36.2 °C) (Temporal)   Ht 157.5 cm (62\")   Wt 80.7 kg (178 lb)   SpO2 98%   BMI 32.56 kg/m²    Estimated body mass index is 32.56 kg/m² as calculated from the following:    Height as of this encounter: 157.5 cm (62\").    Weight as of this encounter: 80.7 kg (178 lb).   Physical Exam  Vitals reviewed.   Constitutional:       General: She is not in acute distress.     Appearance: Normal appearance.   HENT:      Head: Normocephalic and atraumatic.   Pulmonary:      Effort: Pulmonary effort is normal.   Skin:     Findings: Rash present.      Comments: Zoster-like rash with excoriation to right abdomen    Neurological:      General: No focal deficit present.      Mental Status: She is alert.   Psychiatric:         Thought Content: " Thought content normal.        Result Review :   The following data was reviewed by: Chris Elder MD on 08/31/2022:  CMP    CMP 2/1/22 6/13/22 8/24/22   Glucose 87 96 163 (A)   BUN 11 12 12   Creatinine 0.70 0.68 0.71   eGFR Non African Am 81     Sodium 136 137 135 (A)   Potassium 4.5 4.4 3.4 (A)   Chloride 100 101 101   Calcium 9.6 9.0 8.7   Albumin  4.00 3.50   Total Bilirubin  0.8 1.6 (A)   Alkaline Phosphatase  50 45   AST (SGOT)  21 17   ALT (SGPT)  17 14   (A) Abnormal value            CBC w/diff    CBC w/Diff 10/9/21 10/12/21 8/24/22   WBC 7.33 5.76 12.53 (A)   RBC 4.26 4.18 4.13   Hemoglobin 12.8 12.4 12.6   Hematocrit 38.8 39.2 36.8   MCV 91.1 93.8 89.1   MCH 30.0 29.7 30.5   MCHC 33.0 31.6 34.2   RDW 13.8 13.1 14.3   Platelets 214 296 208   Neutrophil Rel % 69.2 59.1 84.3 (A)   Immature Granulocyte Rel % 0.4 0.7 (A) 0.2   Lymphocyte Rel % 17.2 (A) 22.9 6.2 (A)   Monocyte Rel % 10.8 10.9 9.1   Eosinophil Rel % 1.9 5.0 0.0 (A)   Basophil Rel % 0.5 1.4 0.2   (A) Abnormal value            UA    Urinalysis 8/24/22 8/24/22 8/26/22 9/8/22 2051 2051     Squamous Epithelial Cells, UA  0-2     Specific Gravity, UA 1.007   1.008   Ketones, UA Negative  40 mg/dL (A) Negative   Blood, UA Negative   Negative   Leukocytes, UA Moderate (2+) (A)  Small (1+) (A) Negative   Nitrite, UA Negative   Negative   RBC, UA  0-2 (A)     WBC, UA  21-30 (A)     Bacteria, UA  1+ (A)     (A) Abnormal value            Urine Culture    Urine Culture 8/26/22   Urine Culture >100,000 CFU/mL Klebsiella pneumoniae ssp pneumoniae (A)   (A) Abnormal value            Data reviewed: ED Provider Notes by Gennaro Garcia PA-C (08/24/2022 21:54)            Assessment and Plan    Diagnoses and all orders for this visit:    1. Herpes zoster without complication (Primary)    Other orders  -     valACYclovir (Valtrex) 1000 MG tablet; Take 1 tablet by mouth 3 (Three) Times a Day for 7 days.  Dispense: 21 tablet; Refill: 0  -     triamcinolone  (KENALOG) 0.5 % cream; Apply 1 application topically to the appropriate area as directed 2 (Two) Times a Day for 14 days. For itching rash  Dispense: 30 g; Refill: 0    Education on diagnosis, medication and treatment plan.    Follow Up     Patient was given instructions and counseling regarding her condition. Please see specific information pulled into the AVS if appropriate.   No follow-ups on file.    Chris Elder MD

## 2022-08-31 ENCOUNTER — OFFICE VISIT (OUTPATIENT)
Dept: INTERNAL MEDICINE | Facility: CLINIC | Age: 77
End: 2022-08-31

## 2022-08-31 ENCOUNTER — PATIENT OUTREACH (OUTPATIENT)
Dept: CASE MANAGEMENT | Facility: OTHER | Age: 77
End: 2022-08-31

## 2022-08-31 VITALS
HEIGHT: 62 IN | SYSTOLIC BLOOD PRESSURE: 114 MMHG | DIASTOLIC BLOOD PRESSURE: 70 MMHG | HEART RATE: 79 BPM | TEMPERATURE: 97.1 F | OXYGEN SATURATION: 98 % | WEIGHT: 178 LBS | BODY MASS INDEX: 32.76 KG/M2

## 2022-08-31 DIAGNOSIS — B02.9 HERPES ZOSTER WITHOUT COMPLICATION: Primary | ICD-10-CM

## 2022-08-31 DIAGNOSIS — I10 PRIMARY HYPERTENSION: Primary | ICD-10-CM

## 2022-08-31 DIAGNOSIS — E11.9 TYPE 2 DIABETES MELLITUS WITHOUT COMPLICATION, WITHOUT LONG-TERM CURRENT USE OF INSULIN: ICD-10-CM

## 2022-08-31 PROCEDURE — 99213 OFFICE O/P EST LOW 20 MIN: CPT | Performed by: NURSE PRACTITIONER

## 2022-08-31 RX ORDER — VALACYCLOVIR HYDROCHLORIDE 1 G/1
1000 TABLET, FILM COATED ORAL 3 TIMES DAILY
Qty: 21 TABLET | Refills: 0 | Status: SHIPPED | OUTPATIENT
Start: 2022-08-31 | End: 2022-09-07

## 2022-08-31 RX ORDER — TRIAMCINOLONE ACETONIDE 5 MG/G
1 CREAM TOPICAL 2 TIMES DAILY
Qty: 30 G | Refills: 0 | Status: SHIPPED | OUTPATIENT
Start: 2022-08-31 | End: 2022-09-14

## 2022-08-31 NOTE — OUTREACH NOTE
[facility] End of Month Documentation    This Chronic Medical Management Care Plan for Kelsey Durand, 77 y.o. female, has been monitored and managed; reviewed; established and a new plan of care implemented for the month of August.  A cumulative time of 25  minutes was spent on this patient record this month, including chart review; phone call with patient; electronic communication with primary care provider, chart, provider, patient.    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  A detailed listing of time spent for chronic care management is tracked within each outreach encounter.  Current medications include:  has a current medication list which includes the following prescription(s): acetaminophen, cephalexin, escitalopram, fluticasone, levothyroxine, metoprolol succinate xl, multivitamin with minerals, omeprazole, ondansetron, psyllium, simvastatin, triamcinolone, and valacyclovir. and the patient is reported to be patient is compliant with medication protocol,  Medications are reported to be effective.  All notes on chart for PCP to review.    The patient was monitored remotely for pain, nausea,  vomiting, flank pain.    The patient's physical needs include:  not applicable.     The patient's mental support needs include:  needs met; continued support    The patient's cognitive support needs include:  not applicable    The patient's psychosocial support needs include:  needs met    The patient's functional needs include: needs met    The patient's environmental needs include:  not applicable, NA    Care Plan overall comments:  Care plan to be started at next outreach    Refer to previous outreach notes for more information on the areas listed above.    Monthly Billing Diagnoses  (I10) Primary hypertension    (E11.9) Type 2 diabetes mellitus without complication, without long-term current use of insulin (HCC)    Medications   · Medications have been reconciled    Care Plan progress this month:      Recently Modified Care Plans Updates made since 7/31/2022 12:00 AM    No recently modified care plans.          · Current Specialty Plan of Care Status in process    Instructions   · Patient was provided an electronic copy of care plan  · CCM services were explained and offered and patient has accepted these services.  · Patient has given their written consent to receive CCM services and understands that this includes the authorization of electronic communication of medical information with the other treating providers.  · Patient understands that they may stop CCM services at any time and these changes will be effective at the end of the calendar month and will effectively revocate the agreement of CCM services.  · Patient understands that only one practitioner can furnish and be paid for CCM services during one calendar month.  Patient also understands that there may be co-payment or deductible fees in association with CCM services.  · Patient will continue with at least monthly follow-up calls with the Nurse Navigator.    JEFFERY YOUNGBLOOD  Ambulatory Case Management    8/31/2022, 13:54 EDT

## 2022-09-01 ENCOUNTER — PATIENT OUTREACH (OUTPATIENT)
Dept: CASE MANAGEMENT | Facility: OTHER | Age: 77
End: 2022-09-01

## 2022-09-01 DIAGNOSIS — E11.9 TYPE 2 DIABETES MELLITUS WITHOUT COMPLICATION, WITHOUT LONG-TERM CURRENT USE OF INSULIN: ICD-10-CM

## 2022-09-01 DIAGNOSIS — I10 PRIMARY HYPERTENSION: Primary | ICD-10-CM

## 2022-09-01 DIAGNOSIS — R11.0 NAUSEA: ICD-10-CM

## 2022-09-01 NOTE — OUTREACH NOTE
AMBULATORY CASE MANAGEMENT NOTE    Name and Relationship of Patient/Support Person:  -     CCM Interim Update        Per chart review the patient has Gastro 11-15-22 will follow up with patient       Education Documentation  No documentation found.        JEFFERY YOUNGBLOOD  Ambulatory Case Management    9/1/2022, 09:05 EDT

## 2022-09-06 ENCOUNTER — TELEPHONE (OUTPATIENT)
Dept: INTERNAL MEDICINE | Facility: CLINIC | Age: 77
End: 2022-09-06

## 2022-09-08 ENCOUNTER — LAB (OUTPATIENT)
Dept: LAB | Facility: HOSPITAL | Age: 77
End: 2022-09-08

## 2022-09-08 DIAGNOSIS — N39.0 URINARY TRACT INFECTION WITHOUT HEMATURIA, SITE UNSPECIFIED: ICD-10-CM

## 2022-09-08 DIAGNOSIS — N39.0 URINARY TRACT INFECTION WITHOUT HEMATURIA, SITE UNSPECIFIED: Primary | ICD-10-CM

## 2022-09-08 LAB
BILIRUB UR QL STRIP: NEGATIVE
CLARITY UR: CLEAR
COLOR UR: YELLOW
GLUCOSE UR STRIP-MCNC: NEGATIVE MG/DL
HGB UR QL STRIP.AUTO: NEGATIVE
KETONES UR QL STRIP: NEGATIVE
LEUKOCYTE ESTERASE UR QL STRIP.AUTO: NEGATIVE
NITRITE UR QL STRIP: NEGATIVE
PH UR STRIP.AUTO: 7 [PH] (ref 5–8)
PROT UR QL STRIP: NEGATIVE
SP GR UR STRIP: 1.01 (ref 1–1.03)
UROBILINOGEN UR QL STRIP: NORMAL

## 2022-09-08 PROCEDURE — 81003 URINALYSIS AUTO W/O SCOPE: CPT

## 2022-09-08 NOTE — TELEPHONE ENCOUNTER
Caller: Kelsey Durand    Relationship: Self    Best call back number: [phone]    Who are you requesting to speak with (clinical staff, provider,  specific staff member): VALE    What was the call regarding: PATIENT WOULD LIKE TO SPEAK WITH VALE REGARDING NOT FEELING WELL. SHE HAS SHINGLES AND A URINARY TRACT INFECTION, BUT WANTS TO SEE IF THERE IS SOMETHING ELSE GOING ON. ATTEMPTED TO WARM TRANSFER. PLEASE CALL PATIENT TO ADVISE.  
"She is just getting over a UTI and has acute Zoster infection = things \"ain't gonna be normal for a while\".    I put in orders for a repeat UA C&S.  She can do that today and call tomorrow for the results.  "
Patient informed.  She is aware  
Patient states that in the mornings she just feels tired and run down but in the afternoons she feels better.  This started when she had the UTI.  She wants to know what you feel is causing this and if there is something else she needs to do?  Just starting to get concerned.  
Patient is not pregnant (male or female)

## 2022-09-20 ENCOUNTER — PATIENT OUTREACH (OUTPATIENT)
Dept: CASE MANAGEMENT | Facility: OTHER | Age: 77
End: 2022-09-20

## 2022-09-20 DIAGNOSIS — I10 PRIMARY HYPERTENSION: Primary | ICD-10-CM

## 2022-09-20 DIAGNOSIS — E11.9 TYPE 2 DIABETES MELLITUS WITHOUT COMPLICATION, WITHOUT LONG-TERM CURRENT USE OF INSULIN: ICD-10-CM

## 2022-09-20 PROCEDURE — 99490 CHRNC CARE MGMT STAFF 1ST 20: CPT | Performed by: INTERNAL MEDICINE

## 2022-09-20 NOTE — PROGRESS NOTES
"Chief Complaint  boil/ringworm on right side (She states that she had Shingles, but this area has been there since August) and balance off (Pt states that is very off balance, she states that it is worse since she has recent UTI. /She would like to discuss taking AZO. )    Subjective          Kelsey Durand presents to [facility]     History of Present Illness  Patient pleasant 77-year-old female with underlying hyperlipidemia, hypothyroidism, IFG versus diabetes, among others, who is coming in 6/22 for her routine 4-month follow-up---> here 9/22 for urgent issue, in f/u of ER and OV 3 weeks ago = \"Zoster-like rash with excoriation to right abdomen\".     Review of Systems   Constitutional: Negative for appetite change, fatigue and fever.   HENT: Negative for congestion and ear pain.    Eyes: Negative for blurred vision.   Respiratory: Negative for cough, chest tightness, shortness of breath and wheezing.    Cardiovascular: Negative for chest pain, palpitations and leg swelling.   Gastrointestinal: Negative for abdominal pain.   Genitourinary: Negative for difficulty urinating, dysuria and hematuria.   Musculoskeletal: Negative for arthralgias and gait problem.   Skin: Negative for skin lesions.   Neurological: Negative for syncope, memory problem and confusion.   Psychiatric/Behavioral: Negative for self-injury and depressed mood.       Objective   Vital Signs:   /70 (BP Location: Left arm, Patient Position: Sitting, Cuff Size: Adult)   Temp 97.1 °F (36.2 °C)   Ht 157.5 cm (62.01\")   Wt 79.3 kg (174 lb 12.8 oz)   BMI 31.96 kg/m²           Physical Exam  Vitals and nursing note reviewed.   Constitutional:       General: She is not in acute distress.     Appearance: Normal appearance. She is not toxic-appearing.   HENT:      Head: Atraumatic.      Right Ear: External ear normal.      Left Ear: External ear normal.      Nose: Nose normal.      Mouth/Throat:      Mouth: " Mucous membranes are moist.   Eyes:      General:         Right eye: No discharge.         Left eye: No discharge.      Extraocular Movements: Extraocular movements intact.      Pupils: Pupils are equal, round, and reactive to light.   Cardiovascular:      Rate and Rhythm: Normal rate and regular rhythm.      Pulses: Normal pulses.      Heart sounds: Normal heart sounds. No murmur heard.    No gallop.   Pulmonary:      Effort: Pulmonary effort is normal. No respiratory distress.      Breath sounds: No wheezing, rhonchi or rales.   Abdominal:      General: There is no distension.      Palpations: Abdomen is soft. There is no mass.      Tenderness: There is no abdominal tenderness. There is no guarding.   Musculoskeletal:         General: No swelling or tenderness.      Cervical back: No tenderness.      Right lower leg: No edema.      Left lower leg: No edema.   Skin:     General: Skin is warm and dry.      Findings: No rash.   Neurological:      General: No focal deficit present.      Mental Status: She is alert and oriented to person, place, and time. Mental status is at baseline.      Motor: No weakness.      Gait: Gait normal.   Psychiatric:         Mood and Affect: Mood normal.         Thought Content: Thought content normal.          Result Review :   The following data was reviewed by: Chris Elder MD on 10/18/2021:                  Assessment and Plan    Diagnoses and all orders for this visit:    1. Primary hypertension (Primary)  Assessment & Plan:  Blood pressure remains well controlled as of her 9/22 urgent visit.  Patient stable to continue with low-dose metoprolol only.      2. Herpes zoster with complication  Assessment & Plan:  Patient with apparent secondarily infected lesion right lower abdomen, she has a scab on it, there are some surrounding erythema.  This is a patient should continue to resolve, we will give her first-line antibiotic as well, patient to call if fevers or if the redness  "spreads.      3. Neurologic gait dysfunction  Assessment & Plan:  Patient with some dizzy episodes, some progressive weakness, no bad falls that I am aware of.  Certainly sounds appropriate to get her in with outpatient physical therapy.  Orders placed.    Orders:  -     Ambulatory Referral to Physical Therapy Evaluate and treat    Other orders  -     cephalexin (Keflex) 500 MG capsule; Take 1 capsule by mouth 3 (Three) Times a Day.  Dispense: 21 capsule; Refill: 0     --  --  OLDER NOTES:  VISIT 4/21:  ANNUAL MEDICARE WELLNESS PHYSICAL 4/19 = reviewed all forms in office with pt; no new discoveries; active at [employer]=class and walks/ bike at home.  H.M. ISSUES:  BMI 30 and d/w watch carbs and walk.  --  \"HYPERTENSION\" controlled=better at home='s (off meds good while)---> d/w check occ and let us know=fine at home as of 4/21.    LIPIDS at goal with LDL 68...101 so f/u before change...93 fine---> 80 stable 4/21.  --  HYPOTHYROIDISM stable 10/16 with TSH 0.2 still=on same dose many years---> fine 4/21.  IFG mild/stable = FBS 92 with A1C 6.0---> 6.3 in 4/20---> 6.0 in 4/21.  --  ANXIETY D/O 1/19 and has benzo to use PRN; d/w call if feels she needs to use it too often and will add SSRI...cheerful 10/19...will use low dose lexapro as of 4/20 OV---> seems better 10/20.  B12 URM=559 in 4/20.  --  SYNCOPE is ? seizure per Dr Valadez/Flash in [facility] over the weekend, so will get EEG (tongue bite/incontinent)...EEG was neg 9/15.  --  BMD 5/17 = spine 0.9, hip 0.6 is great---> BMD 7/20 = spine 1.0, hip -0.3 = great!!  VIT D DEF remains stable on 2000 qd...28 and d/w take routinely please...44---> same 10/20.  --  DJD no new c/o.  R KNEE PAIN with no trauma, comes and goes, exam is w/o laxity, so agree with repeat mobic and use brace and then PTA if no better.  --  A.R. no flare.  --  --  MMG 4/29/21 per Dr Lawler/Fabiola Pacheco.  COLON 11/19...polyp/FH+ = daughter = 5 years per Dr. Streeter  Adacel 3/14, Prevnar '16; Pneumovax " #1 10/17; Hep A x2 10/18; d/w Shingrix 10/18;   ( Samuel 7/13, 2 girls here=Leigh Ann (no kids) and Xiao = youngest is boy in 6th grade 10/20 and girl with Asperger's is Jr at [employer]).    Follow Up   Return for Next scheduled follow up.  Patient was given instructions and counseling regarding her condition or for health maintenance advice. Please see specific information pulled into the AVS if appropriate.

## 2022-09-20 NOTE — OUTREACH NOTE
AMBULATORY CASE MANAGEMENT NOTE    Name and Relationship of Patient/Support Person:  -     CCM Interim Update        Called patient and left message to return call     Patient returned call and stated that she was feeling some better. Stated and confirmed by chart that gastro appointment had been bumped to 11-15-22 for her ESOPHAGOGASTRODUODENOSCOPY the diagnosis for this procedure is for   Right upper quadrant abdominal pain   Nausea   Nausea and vomiting, unspecified vomiting type   Gastroesophageal reflux disease, unspecified whether esophagitis present       The patient stated she has had symptoms of UTI earlier this month and that UA came back as clear stated that she now feels better but still having nausea. Patient will be seeing PCP on 9-21-22 for follow up visit. Patient aware of vist day and time. Meds discussed no refills required.Patient  Stated no further needs at this time.     Education Documentation  No documentation found.        JEFFERY YOUNGBLOOD  Ambulatory Case Management    9/20/2022, 10:59 EDT

## 2022-09-21 ENCOUNTER — OFFICE VISIT (OUTPATIENT)
Dept: INTERNAL MEDICINE | Facility: CLINIC | Age: 77
End: 2022-09-21

## 2022-09-21 VITALS
HEIGHT: 62 IN | DIASTOLIC BLOOD PRESSURE: 70 MMHG | SYSTOLIC BLOOD PRESSURE: 104 MMHG | WEIGHT: 174.8 LBS | BODY MASS INDEX: 32.17 KG/M2 | TEMPERATURE: 97.1 F

## 2022-09-21 DIAGNOSIS — I10 PRIMARY HYPERTENSION: Primary | ICD-10-CM

## 2022-09-21 DIAGNOSIS — R26.9 NEUROLOGIC GAIT DYSFUNCTION: ICD-10-CM

## 2022-09-21 DIAGNOSIS — Z23 NEED FOR VACCINATION: ICD-10-CM

## 2022-09-21 DIAGNOSIS — B02.8 HERPES ZOSTER WITH COMPLICATION: ICD-10-CM

## 2022-09-21 PROCEDURE — 90662 IIV NO PRSV INCREASED AG IM: CPT | Performed by: INTERNAL MEDICINE

## 2022-09-21 PROCEDURE — 99214 OFFICE O/P EST MOD 30 MIN: CPT | Performed by: INTERNAL MEDICINE

## 2022-09-21 PROCEDURE — G0008 ADMIN INFLUENZA VIRUS VAC: HCPCS | Performed by: INTERNAL MEDICINE

## 2022-09-21 RX ORDER — CEPHALEXIN 500 MG/1
500 CAPSULE ORAL 3 TIMES DAILY
Qty: 21 CAPSULE | Refills: 0 | Status: SHIPPED | OUTPATIENT
Start: 2022-09-21 | End: 2022-12-22

## 2022-09-21 NOTE — ASSESSMENT & PLAN NOTE
Patient with some dizzy episodes, some progressive weakness, no bad falls that I am aware of.  Certainly sounds appropriate to get her in with outpatient physical therapy.  Orders placed.

## 2022-09-21 NOTE — ASSESSMENT & PLAN NOTE
Patient with apparent secondarily infected lesion right lower abdomen, she has a scab on it, there are some surrounding erythema.  This is a patient should continue to resolve, we will give her first-line antibiotic as well, patient to call if fevers or if the redness spreads.

## 2022-09-21 NOTE — ASSESSMENT & PLAN NOTE
Blood pressure remains well controlled as of her 9/22 urgent visit.  Patient stable to continue with low-dose metoprolol only.

## 2022-09-28 ENCOUNTER — CLINICAL SUPPORT (OUTPATIENT)
Dept: INTERNAL MEDICINE | Facility: CLINIC | Age: 77
End: 2022-09-28

## 2022-09-28 DIAGNOSIS — Z23 NEED FOR VACCINATION: Primary | ICD-10-CM

## 2022-09-28 PROCEDURE — 0124A PR ADM SARSCOV2 30MCG/0.3ML BST: CPT | Performed by: INTERNAL MEDICINE

## 2022-09-28 PROCEDURE — 91312 COVID-19 (PFIZER) BIVALENT BOOSTER 12+YRS: CPT | Performed by: INTERNAL MEDICINE

## 2022-09-30 ENCOUNTER — PATIENT OUTREACH (OUTPATIENT)
Dept: CASE MANAGEMENT | Facility: OTHER | Age: 77
End: 2022-09-30

## 2022-09-30 DIAGNOSIS — E11.9 TYPE 2 DIABETES MELLITUS WITHOUT COMPLICATION, WITHOUT LONG-TERM CURRENT USE OF INSULIN: ICD-10-CM

## 2022-09-30 DIAGNOSIS — I10 PRIMARY HYPERTENSION: Primary | ICD-10-CM

## 2022-09-30 NOTE — OUTREACH NOTE
[facility] End of Month Documentation    This Chronic Medical Management Care Plan for Kelsey Durand, 77 y.o. female, has been monitored and managed; reviewed; established and a new plan of care implemented for the month of September.  A cumulative time of 28  minutes was spent on this patient record this month, including chart review; phone call with patient, chart, provider, patient.    Regarding the patient's problems: has Acquired atrophy of thyroid; Allergic rhinitis due to pollen; Mixed hyperlipidemia; Vitamin D deficiency; Triggering of digit; Type 2 diabetes mellitus without complication, without long-term current use of insulin (HCC); Primary osteoarthritis involving multiple joints; Hospital discharge follow-up; Recurrent major depression in partial remission ([facility]); Primary hypertension; Right foot pain; Nondisplaced fracture of fifth right metatarsal bone with routine healing; Acute cystitis without hematuria; Gastroesophageal reflux disease without esophagitis; Gallstones; Right upper quadrant abdominal pain; Nausea; Nausea and vomiting; Herpes zoster with complication; and Neurologic gait dysfunction on their problem list., the following items were addressed: medical records; medications; referrals to community service providers and any changes can be found within the plan section of the note.  A detailed listing of time spent for chronic care management is tracked within each outreach encounter.  Current medications include:  has a current medication list which includes the following prescription(s): acetaminophen, cephalexin, escitalopram, fluticasone, levothyroxine, metoprolol succinate xl, multivitamin with minerals, ondansetron, psyllium, and simvastatin. and the patient is reported to be patient is compliant with medication protocol,  Medications are reported to be effective.  All notes on chart for PCP to review.    The patient was monitored remotely for pain, nausea,possable UTI.    The patient's  physical needs include:  not applicable.     The patient's mental support needs include:  needs met; continued support    The patient's cognitive support needs include:  not applicable    The patient's psychosocial support needs include:  needs met    The patient's functional needs include: needs met    The patient's environmental needs include:  not applicable, NA    Care Plan overall comments:  Care plan to be started at next outreach    Refer to previous outreach notes for more information on the areas listed above.    Monthly Billing Diagnoses  (I10) Primary hypertension    (E11.9) Type 2 diabetes mellitus without complication, without long-term current use of insulin (HCC)    Medications   · Medications have been reconciled    Care Plan progress this month:      Recently Modified Care Plans Updates made since 8/30/2022 12:00 AM    No recently modified care plans.          · Current Specialty Plan of Care Status in process    Instructions   · Patient was provided an electronic copy of care plan  · CCM services were explained and offered and patient has accepted these services.  · Patient has given their written consent to receive CCM services and understands that this includes the authorization of electronic communication of medical information with the other treating providers.  · Patient understands that they may stop CCM services at any time and these changes will be effective at the end of the calendar month and will effectively revocate the agreement of CCM services.  · Patient understands that only one practitioner can furnish and be paid for CCM services during one calendar month.  Patient also understands that there may be co-payment or deductible fees in association with CCM services.  · Patient will continue with at least monthly follow-up calls with the Nurse Navigator.    JEFFERY YOUNGBLOOD  Ambulatory Case Management    9/30/2022, 10:53 EDT

## 2022-10-03 ENCOUNTER — TELEPHONE (OUTPATIENT)
Dept: GASTROENTEROLOGY | Facility: CLINIC | Age: 77
End: 2022-10-03

## 2022-10-03 RX ORDER — ESCITALOPRAM OXALATE 5 MG/1
TABLET ORAL
Qty: 90 TABLET | Refills: 1 | Status: SHIPPED | OUTPATIENT
Start: 2022-10-03

## 2022-10-18 RX ORDER — LEVOTHYROXINE SODIUM 0.1 MG/1
100 TABLET ORAL EVERY MORNING
Qty: 90 TABLET | Refills: 1 | Status: SHIPPED | OUTPATIENT
Start: 2022-10-18

## 2022-10-28 ENCOUNTER — TREATMENT (OUTPATIENT)
Dept: PHYSICAL THERAPY | Facility: CLINIC | Age: 77
End: 2022-10-28

## 2022-10-28 DIAGNOSIS — R26.89 BALANCE DISORDER: ICD-10-CM

## 2022-10-28 DIAGNOSIS — R53.1 WEAKNESS GENERALIZED: Primary | ICD-10-CM

## 2022-10-28 DIAGNOSIS — R26.9 GAIT DISTURBANCE: ICD-10-CM

## 2022-10-28 PROCEDURE — 97161 PT EVAL LOW COMPLEX 20 MIN: CPT | Performed by: PHYSICAL THERAPIST

## 2022-10-28 NOTE — PROGRESS NOTES
Physical Therapy Initial Evaluation and Plan of Care  [address] [place] [id number]    Patient: Kelsey Durand   : 1945  Diagnosis/ICD-10 Code:  Weakness generalized [R53.1]  Referring practitioner: Chris Elder MD  Date of Initial Visit: 10/28/2022  Today's Date: 10/28/2022  Patient seen for 1 sessions           Subjective Questionnaire: 30 second STS: 6 times with no UE support; TU seconds with rollator       Subjective Evaluation    History of Present Illness  Mechanism of injury: Pt presents to PT with generalized weakness and decrease in balance. Pt reports she has dealt with weakness for several years but reports in April she fell while taking the trash out and fractured the base of her fifth metatarsal. Pt reports she was in a boot and/or hard shoe until . Pt presents to PT with rollator, patient reports she uses a rollator for community ambulation and uses a cane from ambulation at home. Pt reports in the past couple of weeks she has tried to use the cane less at home. Pt does report she has had one additional fall since April. Pt states that she also feels like her legs don't want to move at times when she is standing. Pt does live alone.    Pain  Current pain ratin  At best pain ratin  At worst pain ratin    Patient Goals  Patient goals for therapy: increased strength and improved balance  Patient goal: To be able to walk without a walker and be able to go to the stores without walker           Objective          Strength/Myotome Testing     Left Hip   Planes of Motion   Flexion: 4-  Extension: 4-  Abduction: 4-  Adduction: 4-    Right Hip   Planes of Motion   Flexion: 4  Extension: 4  Abduction: 4  Adduction: 4    Left Knee   Flexion: 4  Extension: 4    Right Knee   Flexion: 4  Extension: 4    Left Ankle/Foot   Dorsiflexion: 4    Right Ankle/Foot   Dorsiflexion: 4+    Ambulation     Observational Gait     Additional Observational Gait Details  Pt ambulates  with rollator with very flexed posture and decrease in gait speed.     Functional Assessment     Comments  Pt unable to stand without AD with feet together for more then 1 second before losing balance.        See Exercise, Manual, and Modality Logs for complete treatment.       Assessment & Plan     Assessment  Impairments: abnormal gait, activity intolerance, impaired balance, impaired physical strength, lacks appropriate home exercise program, pain with function and weight-bearing intolerance  Functional Limitations: lifting, walking and standing  Assessment details: Pt presents to PT with generalized weakness and poor balance. Pt has decrease in LE strength with L LE being weaker then R LE, decrease in balance with only being able to stand without rollator for 1 second, and decrease in gait speed. Will perform PT in order to address patient's deficits in order to return patient back to maximum function. Will initiate patient a HEP once patient's balance improves.  Prognosis: good    Goals  Plan Goals: HIP PROBLEMS:    2. The patient demonstrates weakness of the bilateral hip.    LTG 2: 12 weeks: The patient will demonstrate 4+/5 strength for bilateral hip flexion, abduction, and extension in order to improve hip stability in order to improve patient gait biomechanics, ascend/descend stairs, and be able to ambulate without AD.    STATUS: New    STG 2a: 6 weeks: The patient will demonstrate 4/5 strength for bilateral hip flexion, abduction, and extension.    STATUS: New    3. The patient has gait dysfunction.    LTG 3: 12 weeks: The patient will ambulate without assistive device, independently, for community distances  in order to improve mobility and allow patient to perform activities such as grocery shopping with greater ease.    STATUS: New    LTG 3: 6 weeks: The patient will household without assistive device, independently, for community distances  in order to improve mobility and allow patient to perform  activities such as cooking, cleaning, and all ADL's.    STATUS: New    STG 3a: The patient will be independent in HEP.    STATUS: New    Mobility: Walking/Moving Around Functional Limitation    LTG 4: 12 weeks: Pt will be able to perform TUG in 13 seconds or less with no AD in order to decrease patient's fall risk.    STATUS: New    STG 4a: 6 weeks: Pt will be able to perform TUG in 18 seconds or less with no AD.    STATUS: New    LTG 5: 12 weeks: Pt will be able to perform 9 STS with no UE support in order to improve patient's mobility to perform ADL's.    STATUS: New    STG 4a: 6 weeks: Pt will be able to perform 7 STS with no UE support     STATUS: New      Plan  Therapy options: will be seen for skilled therapy services  Planned modality interventions: cryotherapy and TENS  Planned therapy interventions: manual therapy, neuromuscular re-education, ADL retraining, balance/weight-bearing training, flexibility, functional ROM exercises, gait training, home exercise program, joint mobilization, soft tissue mobilization, strengthening, stretching, therapeutic activities, spinal/joint mobilization and abdominal trunk stabilization  Frequency: 2x week  Duration in weeks: 12  Treatment plan discussed with: patient        History # of Personal Factors and/or Comorbidities: LOW (0)  Examination of Body System(s): # of elements: LOW (1-2)  Clinical Presentation: STABLE   Clinical Decision Making: LOW       Timed:         Manual Therapy:    0     mins  42177;     Therapeutic Exercise:    0     mins  54344;     Neuromuscular Tammy:    0    mins  99845;    Therapeutic Activity:     0     mins  10436;     Gait Trainin     mins  23534;     Ultrasound:     0     mins  38762;    Ionto                               0    mins   61999  Self pay                         0     mins [id number]    Un-Timed:  Electrical Stimulation:    0     mins  43428 (MC )  Traction     0     mins 53258  Low Eval     45     Mins  46932  Mod  Eval     0     Mins  01714  High Eval                       0     Mins  86081  Self Pay Eval                 0     PTSP1   Re-Eval                           0    mins  84616        Timed Treatment:   0   mins   Total Treatment:     45   mins    PT SIGNATURE: Electronically signed by Edgardo Wyatt PT  [place] LICENSE: [id number]    DATE TREATMENT INITIATED: 10/28/2022    Initial Certification  Certification Period: 10/28/2022 thru 1/25/2023  I certify that the therapy services are furnished while this patient is under my care.  The services outlined above are required by this patient, and will be reviewed every 90 days.     PHYSICIAN: Chris Elder MD   NPI: [id number]      DATE:     Please sign and return via fax to [phone] Thank you, [facility].

## 2022-11-01 ENCOUNTER — TREATMENT (OUTPATIENT)
Dept: PHYSICAL THERAPY | Facility: CLINIC | Age: 77
End: 2022-11-01

## 2022-11-01 DIAGNOSIS — R53.1 WEAKNESS GENERALIZED: Primary | ICD-10-CM

## 2022-11-01 DIAGNOSIS — R26.9 GAIT DISTURBANCE: ICD-10-CM

## 2022-11-01 DIAGNOSIS — R26.89 BALANCE DISORDER: ICD-10-CM

## 2022-11-01 PROCEDURE — 97112 NEUROMUSCULAR REEDUCATION: CPT | Performed by: PHYSICAL THERAPIST

## 2022-11-01 PROCEDURE — 97110 THERAPEUTIC EXERCISES: CPT | Performed by: PHYSICAL THERAPIST

## 2022-11-01 NOTE — PROGRESS NOTES
Physical Therapy Daily Treatment Note      Patient: Kelsey Durand   : 1945  Referring practitioner: Chris Elder MD  Date of Initial Visit: Type: THERAPY  Noted: 10/28/2022  Today's Date: 2022  Patient seen for 2 sessions           Subjective Questionnaire:       Subjective Evaluation    History of Present Illness    Subjective comment: Pt presents to clinic ambulating with rollator.         Objective   See Exercise, Manual, and Modality Logs for complete treatment.       Assessment & Plan     Assessment    Assessment details: Pt is unsteady walking without rolator and reports she goes without AD at home.  Pt struggled with balance activities and will benefit from continued PT to improve BLE strength and balance.        Visit Diagnoses:    ICD-10-CM ICD-9-CM   1. Weakness generalized  R53.1 780.79   2. Gait disturbance  R26.9 781.2   3. Balance disorder  R26.89 781.99       Progress per Plan of Care and Progress strengthening /stabilization /functional activity           Timed:  Manual Therapy:         mins  38206;  Therapeutic Exercise:    12     mins  24141;     Neuromuscular Tammy:    16    mins  26848;    Therapeutic Activity:          mins  16940;     Gait Training:           mins  77259;     Ultrasound:          mins  46560;    Electrical Stimulation:         mins  69169 ( );  Aquatic Therapy          mins  82815    Untimed:  Electrical Stimulation:         mins  86341 ( );  Mechanical Traction:         mins  41887;     Timed Treatment:   28   mins   Total Treatment:     28   mins    Electronically signed    Theresa Boggs PTA  Physical Therapist Assistant    [employer] license: [id number]

## 2022-11-02 ENCOUNTER — TREATMENT (OUTPATIENT)
Dept: PHYSICAL THERAPY | Facility: CLINIC | Age: 77
End: 2022-11-02

## 2022-11-02 DIAGNOSIS — R26.89 BALANCE DISORDER: ICD-10-CM

## 2022-11-02 DIAGNOSIS — R53.1 WEAKNESS GENERALIZED: Primary | ICD-10-CM

## 2022-11-02 DIAGNOSIS — R26.9 GAIT DISTURBANCE: ICD-10-CM

## 2022-11-02 PROCEDURE — 97112 NEUROMUSCULAR REEDUCATION: CPT | Performed by: PHYSICAL THERAPIST

## 2022-11-02 PROCEDURE — 97110 THERAPEUTIC EXERCISES: CPT | Performed by: PHYSICAL THERAPIST

## 2022-11-02 NOTE — PROGRESS NOTES
Physical Therapy Daily Treatment Note      Patient: Kelsey Durand   : 1945  Referring practitioner: Chris Elder MD  Date of Initial Visit: Type: THERAPY  Noted: 10/28/2022  Today's Date: 2022  Patient seen for 3 sessions           Subjective Questionnaire:       Subjective Evaluation    History of Present Illness    Subjective comment: Pt reports she uses a stick to walk across the driveway to get to her shed.  Pt reports she used to walk in the store with her STC and now she freezes up.       Objective   See Exercise, Manual, and Modality Logs for complete treatment.       Assessment & Plan     Assessment    Assessment details: Pt is unsteady with all balance activities except Rhomberg on floor which she can tolerated for 30 seconds 1/3 times.  Pt tolerated strengthening well.  Pt will benefit from continued PT.        Visit Diagnoses:    ICD-10-CM ICD-9-CM   1. Weakness generalized  R53.1 780.79   2. Gait disturbance  R26.9 781.2   3. Balance disorder  R26.89 781.99       Progress per Plan of Care and Progress strengthening /stabilization /functional activity           Timed:  Manual Therapy:         mins  01823;  Therapeutic Exercise:    15     mins  33269;     Neuromuscular Tammy:    23    mins  60610;    Therapeutic Activity:          mins  87569;     Gait Training:           mins  65543;     Ultrasound:          mins  73554;    Electrical Stimulation:         mins  37122 ( );  Aquatic Therapy          mins  28233    Untimed:  Electrical Stimulation:         mins  05765 ( );  Mechanical Traction:         mins  48358;     Timed Treatment:   38   mins   Total Treatment:     38   mins    Electronically signed    Theresa Boggs PTA  Physical Therapist Assistant    [employer] license: [id number]

## 2022-11-03 RX ORDER — SIMVASTATIN 10 MG
10 TABLET ORAL EVERY OTHER DAY
Qty: 90 TABLET | Refills: 1 | Status: SHIPPED | OUTPATIENT
Start: 2022-11-03

## 2022-11-07 ENCOUNTER — TREATMENT (OUTPATIENT)
Dept: PHYSICAL THERAPY | Facility: CLINIC | Age: 77
End: 2022-11-07

## 2022-11-07 DIAGNOSIS — R26.89 BALANCE DISORDER: ICD-10-CM

## 2022-11-07 DIAGNOSIS — R26.9 GAIT DISTURBANCE: ICD-10-CM

## 2022-11-07 DIAGNOSIS — R53.1 WEAKNESS GENERALIZED: Primary | ICD-10-CM

## 2022-11-07 PROCEDURE — 97110 THERAPEUTIC EXERCISES: CPT | Performed by: PHYSICAL THERAPIST

## 2022-11-07 PROCEDURE — 97530 THERAPEUTIC ACTIVITIES: CPT | Performed by: PHYSICAL THERAPIST

## 2022-11-07 NOTE — PROGRESS NOTES
Outpatient Physical Therapy  [address] [facility], [place] [address]                 Physical Therapy Daily Treatment Note    Patient: Kelsey Durand   : 1945  Diagnosis/ICD-10 Code:  Weakness generalized [R53.1]  Referring practitioner: Chris Elder MD  Date of Initial Visit: Type: THERAPY  Noted: 10/28/2022  Today's Date: 2022  Patient seen for 4 sessions             Subjective   Kelsey Durand reports she took her single point cane to [employer] yesterday instead of her Rolator.       Objective     See Exercise, Manual, and Modality Logs for complete treatment.     Assessment/Plan  Patient demonstrates hesitance due to fear of falling. Patient gravitates forward during balance activities on Airex that requires UE assistance to regain balance regularly. Patient was unable to maintain balance on foam without UE support long enough to preform head turns today. Patient required verbal cuing with majority of exercises and will benefit from continued PT.    Progress per Plan of Care         Timed:  Manual Therapy:         mins  55198;  Therapeutic Exercise:    10     mins  88990;     Neuromuscular Tammy:        mins  28168;    Therapeutic Activity:     20     mins  89411;     Gait Training:           mins  06321;    Aquatic Therapy:          mins  70171;       Untimed:  Electrical Stimulation:         mins  52987 ( );  Mechanical Traction:         mins  61646;       Timed Treatment:   30   mins   Total Treatment:     30   mins      Electronically signed:   Lori Arana PTA  Physical Therapist Assistant  [facility] License #: [id number]

## 2022-11-09 ENCOUNTER — TREATMENT (OUTPATIENT)
Dept: PHYSICAL THERAPY | Facility: CLINIC | Age: 77
End: 2022-11-09

## 2022-11-09 DIAGNOSIS — R26.89 BALANCE DISORDER: ICD-10-CM

## 2022-11-09 DIAGNOSIS — R53.1 WEAKNESS GENERALIZED: Primary | ICD-10-CM

## 2022-11-09 DIAGNOSIS — R26.9 GAIT DISTURBANCE: ICD-10-CM

## 2022-11-09 PROCEDURE — 97530 THERAPEUTIC ACTIVITIES: CPT | Performed by: PHYSICAL THERAPIST

## 2022-11-09 PROCEDURE — 97110 THERAPEUTIC EXERCISES: CPT | Performed by: PHYSICAL THERAPIST

## 2022-11-09 NOTE — PROGRESS NOTES
Outpatient Physical Therapy  [address] [facility], [place] [address]                 Physical Therapy Daily Treatment Note    Patient: Kelsey Durand   : 1945  Diagnosis/ICD-10 Code:  Weakness generalized [R53.1]  Referring practitioner: Chris Elder MD  Date of Initial Visit: Type: THERAPY  Noted: 10/28/2022  Today's Date: 2022  Patient seen for 5 sessions             Subjective   Kelsey Durand reports: last night she was dust mopping and fell in the kitchen. Patient reported she isn't sure what caused the fall but she held on to the mop as she fell onto her right side; she did not hit her head or hurt anything. Patient reported using the counter to get back up.     Patient also reports her thighs are sore from exercise, more so on the left leg.   Patient discussed with therapist that she experiances increased difficulty with her balance from garage to house after dark so she avoids going out at night.    Objective     See Exercise, Manual, and Modality Logs for complete treatment.     Assessment/Plan  Patient continues have difficulty with balance activities, as evident by her recent fall at home. Pt would benefit from skilled PT to address Strength and Balance deficits as well as any concerns with ADLs.     Progress per Plan of Care         Timed:  Manual Therapy:    0     mins  42269;  Therapeutic Exercise:    16     mins  85664;     Neuromuscular Tammy:    0    mins  44695;    Therapeutic Activity:     16     mins  57076;     Gait Trainin     mins  06358;    Aquatic Therapy:     0     mins  61628;       Untimed:  Electrical Stimulation:    0     mins  78647 ( );  Mechanical Traction:    0     mins  32088;       Timed Treatment:   32   mins   Total Treatment:     32   mins      Electronically signed:   Lori Arana PTA  Physical Therapist Assistant  [facility] License #: [id number]

## 2022-11-14 ENCOUNTER — TREATMENT (OUTPATIENT)
Dept: PHYSICAL THERAPY | Facility: CLINIC | Age: 77
End: 2022-11-14

## 2022-11-14 DIAGNOSIS — R53.1 WEAKNESS GENERALIZED: Primary | ICD-10-CM

## 2022-11-14 DIAGNOSIS — R26.89 BALANCE DISORDER: ICD-10-CM

## 2022-11-14 DIAGNOSIS — R26.9 GAIT DISTURBANCE: ICD-10-CM

## 2022-11-14 PROCEDURE — 97530 THERAPEUTIC ACTIVITIES: CPT | Performed by: PHYSICAL THERAPIST

## 2022-11-14 PROCEDURE — 97112 NEUROMUSCULAR REEDUCATION: CPT | Performed by: PHYSICAL THERAPIST

## 2022-11-14 PROCEDURE — 97110 THERAPEUTIC EXERCISES: CPT | Performed by: PHYSICAL THERAPIST

## 2022-11-14 NOTE — PROGRESS NOTES
Outpatient Physical Therapy  [address] [facility], [place] [address]                 Physical Therapy Daily Treatment Note    Patient: Kelsey Durand   : 1945  Diagnosis/ICD-10 Code:  Weakness generalized [R53.1]  Referring practitioner: Chris Elder MD  Date of Initial Visit: Type: THERAPY  Noted: 10/28/2022  Today's Date: 2022  Patient seen for 6 sessions             Subjective   Kelsey Durand reports: she has not had anymore falls at home but noticed she is able to stand up without using her hands after putting on her shoes. Patient also got a kitten on Saturday so over the weekend she used her cane more at home incase the kitten got in her way.     Objective   No complaints of increased pain or discomfort.     See Exercise, Manual, and Modality Logs for complete treatment.     Assessment/Plan  Pt demonstrated increased activity tolerance during balance activities and required less UE support. Patient tolerated progression of exercise well but showed signs of increased difficulty towards the end of activities progressed.  Pt would benefit from skilled PT to address strength and endurance deficits, transfer and gait training and any concerns with ADLs.     Progress per Plan of Care       Timed:  Manual Therapy:    0     mins  22728;  Therapeutic Exercise:    16     mins  48512;     Neuromuscular Tammy:    15    mins  43853;    Therapeutic Activity:     8     mins  22355;     Gait Trainin     mins  77335;    Aquatic Therapy:     0     mins  44646;       Untimed:  Electrical Stimulation:    0     mins  02321 ( );  Mechanical Traction:    0     mins  65249;       Timed Treatment:   39   mins   Total Treatment:     39   mins      Electronically signed:   Lori Arana PTA  Physical Therapist Assistant  [facility] License #: [id number]

## 2022-11-16 ENCOUNTER — TREATMENT (OUTPATIENT)
Dept: PHYSICAL THERAPY | Facility: CLINIC | Age: 77
End: 2022-11-16

## 2022-11-16 DIAGNOSIS — R26.89 BALANCE DISORDER: ICD-10-CM

## 2022-11-16 DIAGNOSIS — R26.9 GAIT DISTURBANCE: ICD-10-CM

## 2022-11-16 DIAGNOSIS — R53.1 WEAKNESS GENERALIZED: Primary | ICD-10-CM

## 2022-11-16 PROCEDURE — 97110 THERAPEUTIC EXERCISES: CPT | Performed by: PHYSICAL THERAPIST

## 2022-11-16 PROCEDURE — 97112 NEUROMUSCULAR REEDUCATION: CPT | Performed by: PHYSICAL THERAPIST

## 2022-11-18 ENCOUNTER — LAB (OUTPATIENT)
Dept: LAB | Facility: HOSPITAL | Age: 77
End: 2022-11-18

## 2022-11-18 ENCOUNTER — TELEPHONE (OUTPATIENT)
Dept: INTERNAL MEDICINE | Facility: CLINIC | Age: 77
End: 2022-11-18

## 2022-11-18 DIAGNOSIS — R30.0 DYSURIA: ICD-10-CM

## 2022-11-18 DIAGNOSIS — R30.0 DYSURIA: Primary | ICD-10-CM

## 2022-11-18 LAB

## 2022-11-18 PROCEDURE — 87186 SC STD MICRODIL/AGAR DIL: CPT

## 2022-11-18 PROCEDURE — 87086 URINE CULTURE/COLONY COUNT: CPT

## 2022-11-18 PROCEDURE — 81001 URINALYSIS AUTO W/SCOPE: CPT

## 2022-11-18 PROCEDURE — 87077 CULTURE AEROBIC IDENTIFY: CPT

## 2022-11-20 LAB — BACTERIA SPEC AEROBE CULT: ABNORMAL

## 2022-11-21 ENCOUNTER — TREATMENT (OUTPATIENT)
Dept: PHYSICAL THERAPY | Facility: CLINIC | Age: 77
End: 2022-11-21

## 2022-11-21 DIAGNOSIS — R26.89 BALANCE DISORDER: ICD-10-CM

## 2022-11-21 DIAGNOSIS — R26.9 GAIT DISTURBANCE: ICD-10-CM

## 2022-11-21 DIAGNOSIS — R53.1 WEAKNESS GENERALIZED: Primary | ICD-10-CM

## 2022-11-21 PROCEDURE — 97110 THERAPEUTIC EXERCISES: CPT | Performed by: PHYSICAL THERAPIST

## 2022-11-21 PROCEDURE — 97530 THERAPEUTIC ACTIVITIES: CPT | Performed by: PHYSICAL THERAPIST

## 2022-11-21 RX ORDER — NITROFURANTOIN 25; 75 MG/1; MG/1
100 CAPSULE ORAL 2 TIMES DAILY
Qty: 10 CAPSULE | Refills: 0 | Status: SHIPPED | OUTPATIENT
Start: 2022-11-21 | End: 2022-11-26

## 2022-11-21 NOTE — PROGRESS NOTES
Outpatient Physical Therapy  [address] [facility], [place] [address]                 Physical Therapy Daily Treatment Note    Patient: Kelsey Durand   : 1945  Diagnosis/ICD-10 Code:  Weakness generalized [R53.1]  Referring practitioner: Chris Elder MD  Date of Initial Visit: Type: THERAPY  Noted: 10/28/2022  Today's Date: 2022  Patient seen for 8 sessions             Subjective   Kelsey Durand reports: she is ready to discharge at her upcoming re-eval.     Objective     See Exercise, Manual, and Modality Logs for complete treatment.     Assessment/Plan  Pt progressing as evident by decreased falls and increased tolerance of PT session. Patient requiring less frequent cuing with exercise. Pt would benefit from skilled PT to address strength and endurance deficits, transfer and gait training and any concerns with ADLs.     Progress per Plan of Care         Timed:  Manual Therapy:         mins  90859;  Therapeutic Exercise:    15     mins  27685;     Neuromuscular Tammy:        mins  37463;    Therapeutic Activity:     10     mins  14002;     Gait Training:           mins  37462;    Aquatic Therapy:          mins  27206;       Untimed:  Electrical Stimulation:         mins  07152 ( );  Mechanical Traction:         mins  51140;       Timed Treatment:   25   mins   Total Treatment:     25   mins      Electronically signed:   Lori Arana PTA  Physical Therapist Assistant  [facility] License #: [id number]

## 2022-11-22 RX ORDER — METOPROLOL SUCCINATE 25 MG/1
25 TABLET, EXTENDED RELEASE ORAL DAILY
Qty: 90 TABLET | Refills: 1 | Status: SHIPPED | OUTPATIENT
Start: 2022-11-22

## 2022-11-23 ENCOUNTER — TREATMENT (OUTPATIENT)
Dept: PHYSICAL THERAPY | Facility: CLINIC | Age: 77
End: 2022-11-23

## 2022-11-23 DIAGNOSIS — R53.1 WEAKNESS GENERALIZED: Primary | ICD-10-CM

## 2022-11-23 DIAGNOSIS — R26.9 GAIT DISTURBANCE: ICD-10-CM

## 2022-11-23 DIAGNOSIS — R26.89 BALANCE DISORDER: ICD-10-CM

## 2022-11-23 PROCEDURE — 97112 NEUROMUSCULAR REEDUCATION: CPT | Performed by: PHYSICAL THERAPIST

## 2022-11-23 PROCEDURE — 97110 THERAPEUTIC EXERCISES: CPT | Performed by: PHYSICAL THERAPIST

## 2022-11-23 NOTE — PROGRESS NOTES
Outpatient Physical Therapy  [address] [facility], [place] [address]                 Physical Therapy Daily Treatment Note    Patient: Kelsey Durand   : 1945  Diagnosis/ICD-10 Code:  Weakness generalized [R53.1]  Referring practitioner: Chris Elder MD  Date of Initial Visit: Type: THERAPY  Noted: 10/28/2022  Today's Date: 2022  Patient seen for 9 sessions             Subjective   Kelsey Durand reports: she had some pain in her knee this morning in her left knee.     Objective     See Exercise, Manual, and Modality Logs for complete treatment.     Assessment/Plan  Patient presented to clinic using cane and is wearing a brace on her left knee due to pain and stiffness she had this morning. Patient has been able to progress from using a rolling walker at home to using a cane in her home. Patient had improved tolerance to balance exercises today and required less UE support.    Progress per Plan of Care       Timed:  Manual Therapy:    0     mins  00998;  Therapeutic Exercise:    15     mins  82513;     Neuromuscular Tammy:    8    mins  91643;    Therapeutic Activity:     0     mins  63648;     Gait Trainin     mins  55599;    Aquatic Therapy:     0     mins  76441;       Untimed:  Electrical Stimulation:    0     mins  58355 ( );  Mechanical Traction:    0     mins  95611;       Timed Treatment:   23   mins   Total Treatment:     23   mins      Electronically signed:   Lori Arana PTA  Physical Therapist Assistant  [facility] License #: [id number]

## 2022-11-23 NOTE — PROGRESS NOTES
Progress Assessment  [address] [place] [id number]        Patient: Kelsey Durand   : 1945  Diagnosis/ICD-10 Code:  Weakness generalized [R53.1]  Referring practitioner: Chris Elder MD  Date of Initial Visit: Type: THERAPY  Noted: 10/28/2022  Today's Date: 2022  Patient seen for 9 sessions      Subjective:   Kelsey Durand reports: 0/10 pain  Subjective Questionnaire: 30 second STS: 10 times and TU seconds with [facility]  Clinical Progress: improved  Home Program Compliance: Yes  Treatment has included: therapeutic exercise, neuromuscular re-education, manual therapy, therapeutic activity and gait training    Subjective Pt reports she feels like PT has helped make her stronger but reports she is ready to be discharged from PT.   Objective     Left Hip   Planes of Motion   Flexion: 4+  Extension: 4+  Abduction: 4+  Adduction: 4+     Right Hip   Planes of Motion   Flexion: 4+  Extension: 4+  Abduction: 4+  Adduction: 4+     Left Knee   Flexion: 4+  Extension: 4+     Right Knee   Flexion: 4+  Extension: 4+     Left Ankle/Foot   Dorsiflexion: 4+     Right Ankle/Foot   Dorsiflexion: 4+    Assessment/Plan     Pt did make progress with improving TUG and STS test and improved in strength since initial evaluation. Pt does still have weakness and some balance deficits in addition to being a fall risk due to her TUG score. Discussed this with patient and patient understood but still wants to discharge from PT. Will be discharging patient but discussed with patient to call if she ever has any questions.     Plan Goals: HIP PROBLEMS:    2. The patient demonstrates weakness of the bilateral hip.    LTG 2: 12 weeks: The patient will demonstrate 4+/5 strength for bilateral hip flexion, abduction, and extension in order to improve hip stability in order to improve patient gait biomechanics, ascend/descend stairs, and be able to ambulate without AD.    STATUS: MET    STG 2a: 6 weeks: The patient will  demonstrate 4/5 strength for bilateral hip flexion, abduction, and extension.    STATUS: MET    3. The patient has gait dysfunction.    LTG 3: 12 weeks: The patient will ambulate without assistive device, independently, for community distances  in order to improve mobility and allow patient to perform activities such as grocery shopping with greater ease.    STATUS: NOT MET    LTG 3: 6 weeks: The patient will household without assistive device, independently, for community distances  in order to improve mobility and allow patient to perform activities such as cooking, cleaning, and all ADL's.    STATUS: NOT MET    STG 3a: The patient will be independent in HEP.    STATUS: MET    Mobility: Walking/Moving Around Functional Limitation    LTG 4: 12 weeks: Pt will be able to perform TUG in 13 seconds or less with no AD in order to decrease patient's fall risk.    STATUS: NOT MET    STG 4a: 6 weeks: Pt will be able to perform TUG in 18 seconds or less with no AD.    STATUS: NOT MET    LTG 5: 12 weeks: Pt will be able to perform 9 STS with no UE support in order to improve patient's mobility to perform ADL's.    STATUS: MET    STG 4a: 6 weeks: Pt will be able to perform 7 STS with no UE support     STATUS: MET  Progress toward previous goals: Partially Met    See Exercise, Manual, and Modality Logs for complete treatment.         Recommendations: Discharge    PT SIGNATURE: Electronically signed by Lori Arana [facility] LICENSE: [id number]    Based upon review of the patient's progress and continued therapy plan, it is my medical opinion that Kelsey Durand should continue physical therapy treatment at [facility] PHYSICAL THERAPY  [address] [facility] [place] [address] [phone].      Timed:                 Manual Therapy:    0     mins  29307;     Therapeutic Exercise:    10     mins  32450;     Neuromuscular Tammy:    0    mins  28295;    Therapeutic Activity:     0     mins  44543;      Gait Trainin     mins  98445;     Ultrasound:     0     mins  99360;    Ionto                               0    mins   03318  Self pay                         0     mins [id number]    Un-Timed:  Electrical Stimulation:    0     mins  36779 ( )  [employer]    0     mins 18020  Dry Needling     0     mins self-pay  Traction     0     mins 52689    Timed Treatment:   10   mins   Total Treatment:     10   mins      I certify that the therapy services are furnished while this patient is under my care.  The services outlined above are required by this patient, and will be reviewed every 90 days.

## 2022-12-06 ENCOUNTER — LAB (OUTPATIENT)
Dept: LAB | Facility: HOSPITAL | Age: 77
End: 2022-12-06

## 2022-12-06 DIAGNOSIS — E55.9 VITAMIN D DEFICIENCY: ICD-10-CM

## 2022-12-06 DIAGNOSIS — E78.2 MIXED HYPERLIPIDEMIA: ICD-10-CM

## 2022-12-06 DIAGNOSIS — E03.4 ACQUIRED ATROPHY OF THYROID: ICD-10-CM

## 2022-12-06 DIAGNOSIS — Z00.00 WELL ADULT EXAM: ICD-10-CM

## 2022-12-06 DIAGNOSIS — E11.9 TYPE 2 DIABETES MELLITUS WITHOUT COMPLICATION, WITHOUT LONG-TERM CURRENT USE OF INSULIN: ICD-10-CM

## 2022-12-06 DIAGNOSIS — I10 PRIMARY HYPERTENSION: ICD-10-CM

## 2022-12-06 LAB
25(OH)D3 SERPL-MCNC: 34 NG/ML (ref 30–100)
ALBUMIN SERPL-MCNC: 4.2 G/DL (ref 3.5–5.2)
ALBUMIN UR-MCNC: 4.5 MG/DL
ALBUMIN/GLOB SERPL: 1.3 G/DL
ALP SERPL-CCNC: 55 U/L (ref 39–117)
ALT SERPL W P-5'-P-CCNC: 17 U/L (ref 1–33)
ANION GAP SERPL CALCULATED.3IONS-SCNC: 6.4 MMOL/L (ref 5–15)
AST SERPL-CCNC: 21 U/L (ref 1–32)
BASOPHILS # BLD AUTO: 0.06 10*3/MM3 (ref 0–0.2)
BASOPHILS NFR BLD AUTO: 1.2 % (ref 0–1.5)
BILIRUB SERPL-MCNC: 0.6 MG/DL (ref 0–1.2)
BUN SERPL-MCNC: 19 MG/DL (ref 8–23)
BUN/CREAT SERPL: 23.2 (ref 7–25)
CALCIUM SPEC-SCNC: 9.1 MG/DL (ref 8.6–10.5)
CHLORIDE SERPL-SCNC: 102 MMOL/L (ref 98–107)
CHOLEST SERPL-MCNC: 160 MG/DL (ref 0–200)
CO2 SERPL-SCNC: 28.6 MMOL/L (ref 22–29)
CREAT SERPL-MCNC: 0.82 MG/DL (ref 0.57–1)
CREAT UR-MCNC: 53.3 MG/DL
DEPRECATED RDW RBC AUTO: 44 FL (ref 37–54)
EGFRCR SERPLBLD CKD-EPI 2021: 72.9 ML/MIN/1.73
EOSINOPHIL # BLD AUTO: 0.15 10*3/MM3 (ref 0–0.4)
EOSINOPHIL NFR BLD AUTO: 3 % (ref 0.3–6.2)
ERYTHROCYTE [DISTWIDTH] IN BLOOD BY AUTOMATED COUNT: 13.5 % (ref 12.3–15.4)
GLOBULIN UR ELPH-MCNC: 3.2 GM/DL
GLUCOSE SERPL-MCNC: 101 MG/DL (ref 65–99)
HBA1C MFR BLD: 6.2 % (ref 4.8–5.6)
HCT VFR BLD AUTO: 39 % (ref 34–46.6)
HDLC SERPL-MCNC: 63 MG/DL (ref 40–60)
HGB BLD-MCNC: 12.6 G/DL (ref 12–15.9)
IMM GRANULOCYTES # BLD AUTO: 0.03 10*3/MM3 (ref 0–0.05)
IMM GRANULOCYTES NFR BLD AUTO: 0.6 % (ref 0–0.5)
LDLC SERPL CALC-MCNC: 84 MG/DL (ref 0–100)
LDLC/HDLC SERPL: 1.32 {RATIO}
LYMPHOCYTES # BLD AUTO: 1.14 10*3/MM3 (ref 0.7–3.1)
LYMPHOCYTES NFR BLD AUTO: 23 % (ref 19.6–45.3)
MCH RBC QN AUTO: 29.2 PG (ref 26.6–33)
MCHC RBC AUTO-ENTMCNC: 32.3 G/DL (ref 31.5–35.7)
MCV RBC AUTO: 90.5 FL (ref 79–97)
MICROALBUMIN/CREAT UR: 84.4 MG/G
MONOCYTES # BLD AUTO: 0.56 10*3/MM3 (ref 0.1–0.9)
MONOCYTES NFR BLD AUTO: 11.3 % (ref 5–12)
NEUTROPHILS NFR BLD AUTO: 3.01 10*3/MM3 (ref 1.7–7)
NEUTROPHILS NFR BLD AUTO: 60.9 % (ref 42.7–76)
NRBC BLD AUTO-RTO: 0 /100 WBC (ref 0–0.2)
PLATELET # BLD AUTO: 278 10*3/MM3 (ref 140–450)
PMV BLD AUTO: 10.2 FL (ref 6–12)
POTASSIUM SERPL-SCNC: 4.2 MMOL/L (ref 3.5–5.2)
PROT SERPL-MCNC: 7.4 G/DL (ref 6–8.5)
RBC # BLD AUTO: 4.31 10*6/MM3 (ref 3.77–5.28)
SODIUM SERPL-SCNC: 137 MMOL/L (ref 136–145)
T4 FREE SERPL-MCNC: 1.4 NG/DL (ref 0.93–1.7)
TRIGL SERPL-MCNC: 68 MG/DL (ref 0–150)
TSH SERPL DL<=0.05 MIU/L-ACNC: 2.03 UIU/ML (ref 0.27–4.2)
VLDLC SERPL-MCNC: 13 MG/DL (ref 5–40)
WBC NRBC COR # BLD: 4.95 10*3/MM3 (ref 3.4–10.8)

## 2022-12-06 PROCEDURE — 80053 COMPREHEN METABOLIC PANEL: CPT

## 2022-12-06 PROCEDURE — 82570 ASSAY OF URINE CREATININE: CPT

## 2022-12-06 PROCEDURE — 85025 COMPLETE CBC W/AUTO DIFF WBC: CPT

## 2022-12-06 PROCEDURE — 36415 COLL VENOUS BLD VENIPUNCTURE: CPT

## 2022-12-06 PROCEDURE — 82043 UR ALBUMIN QUANTITATIVE: CPT

## 2022-12-06 PROCEDURE — 82306 VITAMIN D 25 HYDROXY: CPT

## 2022-12-06 PROCEDURE — 83036 HEMOGLOBIN GLYCOSYLATED A1C: CPT

## 2022-12-06 PROCEDURE — 84443 ASSAY THYROID STIM HORMONE: CPT

## 2022-12-06 PROCEDURE — 84439 ASSAY OF FREE THYROXINE: CPT

## 2022-12-06 PROCEDURE — 80061 LIPID PANEL: CPT

## 2022-12-22 ENCOUNTER — OFFICE VISIT (OUTPATIENT)
Dept: INTERNAL MEDICINE | Facility: CLINIC | Age: 77
End: 2022-12-22

## 2022-12-22 VITALS
DIASTOLIC BLOOD PRESSURE: 60 MMHG | HEIGHT: 62 IN | TEMPERATURE: 97.3 F | BODY MASS INDEX: 32.65 KG/M2 | WEIGHT: 177.4 LBS | OXYGEN SATURATION: 97 % | SYSTOLIC BLOOD PRESSURE: 121 MMHG | HEART RATE: 63 BPM

## 2022-12-22 DIAGNOSIS — E78.2 MIXED HYPERLIPIDEMIA: ICD-10-CM

## 2022-12-22 DIAGNOSIS — R30.0 DYSURIA: ICD-10-CM

## 2022-12-22 DIAGNOSIS — I10 PRIMARY HYPERTENSION: Primary | ICD-10-CM

## 2022-12-22 DIAGNOSIS — E11.9 TYPE 2 DIABETES MELLITUS WITHOUT COMPLICATION, WITHOUT LONG-TERM CURRENT USE OF INSULIN: ICD-10-CM

## 2022-12-22 DIAGNOSIS — E55.9 VITAMIN D DEFICIENCY: ICD-10-CM

## 2022-12-22 DIAGNOSIS — E03.4 ACQUIRED ATROPHY OF THYROID: ICD-10-CM

## 2022-12-22 PROCEDURE — 99214 OFFICE O/P EST MOD 30 MIN: CPT | Performed by: INTERNAL MEDICINE

## 2022-12-22 RX ORDER — MELOXICAM 7.5 MG/1
TABLET ORAL
Qty: 21 TABLET | Refills: 0 | Status: SHIPPED | OUTPATIENT
Start: 2022-12-22

## 2022-12-22 NOTE — ASSESSMENT & PLAN NOTE
TSH is 2.0 as of 12/22 office visit, so patient stable to continue with moderate dose levothyroxine 100 mcg daily.

## 2022-12-22 NOTE — ASSESSMENT & PLAN NOTE
LDL is very stable at 84 as of her 12/22 office visit.  She is being treated for primary prevention, so stable to continue with just very low-dose simvastatin.

## 2022-12-22 NOTE — ASSESSMENT & PLAN NOTE
Vitamin D is holding at 34 as of her 12/22 office visit.  This is with just intermittent multivitamin and/or just what she gets in her diet.  Certainly stable to continue same

## 2022-12-22 NOTE — PROGRESS NOTES
"Chief Complaint  Hypothyroidism, Follow-up (Pt states that this is routine, she had labs.), Ringfinger pian and swelling (On Left hand, she had to take her rings off and it hurts down into her hand. ), and Bialteral leg pain at night    Subjective          Kelsey Durand presents to [facility] INTERNAL MED[facility]INE     History of present illness:  Patient pleasant 77-year-old female with underlying hyperlipidemia, hypothyroidism, IFG versus diabetes, among others, who is coming in 12/22 for her routine 4-month follow-up.  We will review her medication list, go over her labs in detail, and address other concerns at that time.    Review of Systems   Constitutional: Negative for appetite change, fatigue and fever.   HENT: Negative for congestion and ear pain.    Eyes: Negative for blurred vision.   Respiratory: Negative for cough, chest tightness, shortness of breath and wheezing.    Cardiovascular: Negative for chest pain, palpitations and leg swelling.   Gastrointestinal: Negative for abdominal pain.   Genitourinary: Negative for difficulty urinating, dysuria and hematuria.   Musculoskeletal: Negative for arthralgias and gait problem.   Skin: Negative for skin lesions.   Neurological: Negative for syncope, memory problem and confusion.   Psychiatric/Behavioral: Negative for self-injury and depressed mood.       Objective   Vital Signs:   /60   Pulse 63   Temp 97.3 °F (36.3 °C) (Skin)   Ht 157.5 cm (62.01\")   Wt 80.5 kg (177 lb 6.4 oz)   SpO2 97%   BMI 32.44 kg/m²           Physical Exam  Vitals and nursing note reviewed.   Constitutional:       General: She is not in acute distress.     Appearance: Normal appearance. She is not toxic-appearing.   HENT:      Head: Atraumatic.      Right Ear: External ear normal.      Left Ear: External ear normal.      Nose: Nose normal.      Mouth/Throat:      Mouth: Mucous membranes are moist.   Eyes:      General:         Right eye: No discharge.    "      Left eye: No discharge.      Extraocular Movements: Extraocular movements intact.      Pupils: Pupils are equal, round, and reactive to light.   Cardiovascular:      Rate and Rhythm: Normal rate and regular rhythm.      Pulses: Normal pulses.      Heart sounds: Normal heart sounds. No murmur heard.    No gallop.   Pulmonary:      Effort: Pulmonary effort is normal. No respiratory distress.      Breath sounds: No wheezing, rhonchi or rales.   Abdominal:      General: There is no distension.      Palpations: Abdomen is soft. There is no mass.      Tenderness: There is no abdominal tenderness. There is no guarding.   Musculoskeletal:         General: No swelling or tenderness.      Cervical back: No tenderness.      Right lower leg: No edema.      Left lower leg: No edema.   Skin:     General: Skin is warm and dry.      Findings: No rash.   Neurological:      General: No focal deficit present.      Mental Status: She is alert and oriented to person, place, and time. Mental status is at baseline.      Motor: No weakness.      Gait: Gait normal.   Psychiatric:         Mood and Affect: Mood normal.         Thought Content: Thought content normal.          Result Review :   The following data was reviewed by: Chris Elder MD on 10/18/2021:                  Assessment and Plan    Diagnoses and all orders for this visit:    1. Primary hypertension (Primary)  Assessment & Plan:  Blood pressures well controlled as of her 12/22 office visit.  She stable to continue with just low-dose metoprolol.    Orders:  -     Basic Metabolic Panel; Future    2. Type 2 diabetes mellitus without complication, without long-term current use of insulin (HCC)  Assessment & Plan:  A1c is very stable at 6.2 as of her 12/22 office visit.  She does not currently acquire any treatment for this other than conservative dietary restrictions.    Orders:  -     Hemoglobin A1c; Future    3. Acquired atrophy of thyroid  Assessment & Plan:  TSH is 2.0  "as of 12/22 office visit, so patient stable to continue with moderate dose levothyroxine 100 mcg daily.      4. Vitamin D deficiency  Assessment & Plan:  Vitamin D is holding at 34 as of her 12/22 office visit.  This is with just intermittent multivitamin and/or just what she gets in her diet.  Certainly stable to continue same      5. Mixed hyperlipidemia  Assessment & Plan:  LDL is very stable at 84 as of her 12/22 office visit.  She is being treated for primary prevention, so stable to continue with just very low-dose simvastatin.      Other orders  -     meloxicam (Mobic) 7.5 MG tablet; 2 tablets once daily with food for a week, then 1 tablet once daily with food for a week.  Dispense: 21 tablet; Refill: 0     --  --  OLDER NOTES:  VISIT 4/21:  ANNUAL MEDICARE WELLNESS PHYSICAL 4/19 = reviewed all forms in office with pt; no new discoveries; active at mall=class and walks/ bike at home.  H.M. ISSUES:  BMI 30 and d/w watch carbs and walk.  --  \"HYPERTENSION\" controlled=better at home='s (off meds good while)---> d/w check occ and let us know=fine at home as of 4/21.    LIPIDS at goal with LDL 68...101 so f/u before change...93 fine---> 80 stable 4/21.  --  HYPOTHYROIDISM stable 10/16 with TSH 0.2 still=on same dose many years---> fine 4/21.  IFG mild/stable = FBS 92 with A1C 6.0---> 6.3 in 4/20---> 6.0 in 4/21.  --  ANXIETY D/O 1/19 and has benzo to use PRN; d/w call if feels she needs to use it too often and will add SSRI...cheerful 10/19...will use low dose lexapro as of 4/20 OV---> seems better 10/20.  B12 GBR=559 in 4/20.  --  SYNCOPE is ? seizure per Dr Valadez/Flash in [facility] over the weekend, so will get EEG (tongue bite/incontinent)...EEG was neg 9/15.  --  BMD 5/17 = spine 0.9, hip 0.6 is great---> BMD 7/20 = spine 1.0, hip -0.3 = great!!  VIT D DEF remains stable on 2000 qd...28 and d/w take routinely please...44---> same 10/20.  --  DJD no new c/o.  R KNEE PAIN with no trauma, comes and goes, exam " is w/o laxity, so agree with repeat mobic and use brace and then PTA if no better.  --  A.R. no flare.  --  --  MMG 4/29/21 per Dr Lawler/Fabiola Pacheco.  COLON 11/19...polyp/FH+ = daughter = 5 years per Dr. Streeter  Adacel 3/14, Prevnar '16; Pneumovax #1 10/17; Hep A x2 10/18; d/w Shingrix 10/18;   ( Samuel 7/13, 2 girls here=Leigh Ann (no kids) and Xiao = youngest is boy in 9th grade 12/22 and girl with Asperger's is at [facility]).    Follow Up   Return in about 4 months (around 4/22/2023).  Patient was given instructions and counseling regarding her condition or for health maintenance advice. Please see specific information pulled into the AVS if appropriate.

## 2022-12-22 NOTE — ASSESSMENT & PLAN NOTE
Blood pressures well controlled as of her 12/22 office visit.  She stable to continue with just low-dose metoprolol.

## 2022-12-22 NOTE — ASSESSMENT & PLAN NOTE
A1c is very stable at 6.2 as of her 12/22 office visit.  She does not currently acquire any treatment for this other than conservative dietary restrictions.

## 2023-01-04 ENCOUNTER — TELEPHONE (OUTPATIENT)
Dept: INTERNAL MEDICINE | Facility: CLINIC | Age: 78
End: 2023-01-04

## 2023-01-04 ENCOUNTER — TELEPHONE (OUTPATIENT)
Dept: CASE MANAGEMENT | Facility: OTHER | Age: 78
End: 2023-01-04
Payer: MEDICARE

## 2023-01-04 NOTE — TELEPHONE ENCOUNTER
..Called patient and left message to return my call. My  name, reason for call and contact info was left on message.

## 2023-01-04 NOTE — TELEPHONE ENCOUNTER
Caller: Kelsey Durand    Relationship to patient: Self    Best call back number:   [phone]        Patient is needing: PATIENT CALLED IN AND SAID SHE HAD RECEIVED A CALL FROM OFFICE. SHE IS REQUESTING A CALL BACK PLEASE. LAST PERSON THAT HAD CONTACTED WAS MR. HOPE

## 2023-01-06 ENCOUNTER — PATIENT OUTREACH (OUTPATIENT)
Dept: CASE MANAGEMENT | Facility: OTHER | Age: 78
End: 2023-01-06
Payer: MEDICARE

## 2023-01-06 ENCOUNTER — TELEPHONE (OUTPATIENT)
Dept: CASE MANAGEMENT | Facility: OTHER | Age: 78
End: 2023-01-06
Payer: MEDICARE

## 2023-01-06 DIAGNOSIS — I10 PRIMARY HYPERTENSION: Primary | ICD-10-CM

## 2023-01-06 DIAGNOSIS — E11.9 TYPE 2 DIABETES MELLITUS WITHOUT COMPLICATION, WITHOUT LONG-TERM CURRENT USE OF INSULIN: ICD-10-CM

## 2023-01-06 NOTE — OUTREACH NOTE
AMBULATORY CASE MANAGEMENT NOTE    Name and Relationship of Patient/Support Person: Kelsey Durand - Self    Adult Patient Profile  Questions/Answers    Flowsheet Row Most Recent Value   Symptoms/Conditions Managed at Home diabetes, type 2   Barriers to Managing Health none   Diabetes Management Strategies blood glucose testing   Frequency of Blood Glucose Testing other (see comments)   A1C Target 5.5   Usual Blood Glucose WNL   Last A1C Result 6.2   Diabetes Self-Management Outcome 5 (very good)   How to be Addressed Mrs. Durand   How Well Do You Speak English? very well   Source of Information patient   Admission in Past 90 Days none      CCM Interim Update        I reached out to the patient and she stated that she was doing well . Stated that she had a good holiday season and got to spend a lot of time with family. The gastro procedure that was scheduled for Dec was canceled by the patient . She stated that the ABD pain had subsided . She stated that she discover that she had shingles and that she felt that is what was causing the pain . Stated that she was due for a Hector later this year . ( not on care gaps) Patient stated her blood sugar was under control her A1c was 6.5. BP WNL . Patient stated that she has had issues with her left ring finger with minor swelling and stiffness, PCP aware. Patient stated that she also had pain in right foot and upon further diagnosis she found out that she had a fracture , fracture was treated and patient now states no pain other than some arthritis . ..Chart reviewed , meds reviewed, patient stated no further needs at this time.All future appts reviewed . All questions were answered and contact information provided . Future outreach scheduled .         Education Documentation  No documentation found.        JEFFERY YOUNGBLOOD  Ambulatory Case Management    1/6/2023, 11:20 EST

## 2023-01-10 ENCOUNTER — TRANSCRIBE ORDERS (OUTPATIENT)
Dept: ADMINISTRATIVE | Facility: HOSPITAL | Age: 78
End: 2023-01-10
Payer: MEDICARE

## 2023-01-10 DIAGNOSIS — Z12.39 SCREENING BREAST EXAMINATION: Primary | ICD-10-CM

## 2023-01-30 ENCOUNTER — PATIENT OUTREACH (OUTPATIENT)
Dept: CASE MANAGEMENT | Facility: OTHER | Age: 78
End: 2023-01-30
Payer: MEDICARE

## 2023-01-30 DIAGNOSIS — E11.9 TYPE 2 DIABETES MELLITUS WITHOUT COMPLICATION, WITHOUT LONG-TERM CURRENT USE OF INSULIN: ICD-10-CM

## 2023-01-30 DIAGNOSIS — I10 PRIMARY HYPERTENSION: Primary | ICD-10-CM

## 2023-01-30 PROCEDURE — 99490 CHRNC CARE MGMT STAFF 1ST 20: CPT | Performed by: INTERNAL MEDICINE

## 2023-01-30 NOTE — OUTREACH NOTE
AMBULATORY CASE MANAGEMENT NOTE    Name and Relationship of Patient/Support Person:  -     CCM End of Month Documentation    This Chronic Medical Management Care Plan for Kelsey Durand, 77 y.o. female, has been monitored and managed; reviewed; established and a new plan of care implemented for the month of January.  A cumulative time of 22  minutes was spent on this patient record this month, including chart review; phone call with patient, chart, provider, patient.    Regarding the patient's problems: has Acquired atrophy of thyroid; Allergic rhinitis due to pollen; Mixed hyperlipidemia; Vitamin D deficiency; Triggering of digit; Type 2 diabetes mellitus without complication, without long-term current use of insulin (HCC); Primary osteoarthritis involving multiple joints; Hospital discharge follow-up; Recurrent major depression in partial remission (HCC); Primary hypertension; Right foot pain; Nondisplaced fracture of fifth right metatarsal bone with routine healing; Acute cystitis without hematuria; Gastroesophageal reflux disease without esophagitis; Gallstones; Right upper quadrant abdominal pain; Nausea; Nausea and vomiting; Herpes zoster with complication; and Neurologic gait dysfunction on their problem list., the following items were addressed: medical records; medications; referrals to community service providers and any changes can be found within the plan section of the note.  A detailed listing of time spent for chronic care management is tracked within each outreach encounter.  Current medications include:  has a current medication list which includes the following prescription(s): acetaminophen, escitalopram, fluticasone, levothyroxine, meloxicam, metoprolol succinate xl, multivitamin with minerals, ondansetron, psyllium, and simvastatin. and the patient is reported to be patient is compliant with medication protocol,  Medications are reported to be effective.  All notes on chart for PCP to  review.    The patient was monitored remotely for pain.    The patient's physical needs include:  not applicable.     The patient's mental support needs include:  needs met; continued support    The patient's cognitive support needs include:  not applicable    The patient's psychosocial support needs include:  needs met    The patient's functional needs include: needs met    The patient's environmental needs include:  not applicable, NA    Care Plan overall comments:  Care plan to be started at next outreach    Refer to previous outreach notes for more information on the areas listed above.    Monthly Billing Diagnoses  (I10) Primary hypertension    (E11.9) Type 2 diabetes mellitus without complication, without long-term current use of insulin (HCC)    Medications   · Medications have been reconciled    Care Plan progress this month:      Recently Modified Care Plans Updates made since 12/30/2022 12:00 AM    No recently modified care plans.          · Current Specialty Plan of Care Status in process    Instructions   · Patient was provided an electronic copy of care plan  · CCM services were explained and offered and patient has accepted these services.  · Patient has given their written consent to receive CCM services and understands that this includes the authorization of electronic communication of medical information with the other treating providers.  · Patient understands that they may stop CCM services at any time and these changes will be effective at the end of the calendar month and will effectively revocate the agreement of CCM services.  · Patient understands that only one practitioner can furnish and be paid for CCM services during one calendar month.  Patient also understands that there may be co-payment or deductible fees in association with CCM services.  · Patient will continue with at least monthly follow-up calls with the Ambulatory .    JEFFERY YOUNGBLOOD  Ambulatory Case Management    1/30/2023,  14:48 EST    Education Documentation  No documentation found.        JEFFERY YOUNGBLOOD  Ambulatory Case Management    1/30/2023, 14:48 EST

## 2023-02-03 NOTE — TELEPHONE ENCOUNTER
Caller: Kelsey Durand     Relationship: [unfilled]     Best call back number: [phone]    What is your medical concern? PAIN WHILE URINATING    How long has this issue been going on? 1 DAY    Have you been treated for this issue? YES    PATIENT STATES SHE TAKES AZOS TWICE A DAY AND BACKED DOSAGE TO ONCE A DAY. PATIENT STATES SHE IS WAS UP AND DOWN ALL NIGHT AT TIMES BARELY MAKING IT TO BATHROOM. PATIENT ALSO STATES THERE IS A DULL PAIN WHEN SHE IS URINATING THAT GOES AWAY ONCE SHE IS DONE URINATING. . PLEASE CALL PATIENT AND LET HER KNOW WHAT MD HORNE WANTS TO DO IN THE MATTER.  
Let patient know I put orders in for a urinalysis.  She should try to get that done today, and call us Monday for the results.  If there is no signs of urine infection, we can get her started on some bladder medication at that time.  
Pt notified   
[Patient] : Patient

## 2023-02-10 ENCOUNTER — PATIENT OUTREACH (OUTPATIENT)
Dept: CASE MANAGEMENT | Facility: OTHER | Age: 78
End: 2023-02-10
Payer: MEDICARE

## 2023-02-10 DIAGNOSIS — E11.9 TYPE 2 DIABETES MELLITUS WITHOUT COMPLICATION, WITHOUT LONG-TERM CURRENT USE OF INSULIN: Primary | ICD-10-CM

## 2023-02-10 DIAGNOSIS — I10 PRIMARY HYPERTENSION: ICD-10-CM

## 2023-02-10 NOTE — OUTREACH NOTE
AMBULATORY CASE MANAGEMENT NOTE    Name and Relationship of Patient/Support Person: Kelsey Durand - Self    CCM Interim Update        CM (me) reached out to the patient and she stated she was doing well today . She stated that her foot fracture had completely healed and was not causing any pain. Stated that her finger still was swollen and painful stated that the meds that was prescribed to help had not help her pain . Stated that she will take tylenol OTC for pain PRN. The patient stated that Bp was WNL as was her blood sugar . Last A1c 6.2 well below level set at 7.0 . Will continue to follow up and watch Blood Sugar in the future to insure continued low levels . Patient stated she had been told not to consume Grapefruit while taking simvastatin. After review of medication list and research I educated the patient that it was not advised to consume grapefruit while taking that medication. The patient stated that she would drink about 6 ounces of orange juice with her breakfast each morning. I confirmed with an MD as well that orange juice was ok to consume while taking the med. Patient was advised ...Chart reviewed , meds reviewed, patient stated no further needs at this time.All future appts reviewed . All questions were answered and contact information provided . Future outreach scheduled .        Education Documentation  No documentation found.        JEFFERY YOUNGBLOOD  Ambulatory Case Management    2/10/2023, 14:13 EST

## 2023-02-28 ENCOUNTER — PATIENT OUTREACH (OUTPATIENT)
Dept: CASE MANAGEMENT | Facility: OTHER | Age: 78
End: 2023-02-28
Payer: MEDICARE

## 2023-02-28 DIAGNOSIS — E11.9 TYPE 2 DIABETES MELLITUS WITHOUT COMPLICATION, WITHOUT LONG-TERM CURRENT USE OF INSULIN: Primary | ICD-10-CM

## 2023-02-28 DIAGNOSIS — I10 PRIMARY HYPERTENSION: ICD-10-CM

## 2023-02-28 PROCEDURE — 99490 CHRNC CARE MGMT STAFF 1ST 20: CPT | Performed by: INTERNAL MEDICINE

## 2023-02-28 NOTE — OUTREACH NOTE
AMBULATORY CASE MANAGEMENT NOTE    Name and Relationship of Patient/Support Person:  -     [facility] End of Month Documentation    This Chronic Medical Management Care Plan for Kelsey Durand, 77 y.o. female, has been monitored and managed; reviewed and a new plan of care implemented for the month of February.  A cumulative time of 23  minutes was spent on this patient record this month, including chart review; phone call with patient, Med education.    Regarding the patient's problems: has Acquired atrophy of thyroid; Allergic rhinitis due to pollen; Mixed hyperlipidemia; Vitamin D deficiency; Triggering of digit; Type 2 diabetes mellitus without complication, without long-term current use of insulin (HCC); Primary osteoarthritis involving multiple joints; Hospital discharge follow-up; Recurrent major depression in partial remission (HCC); Primary hypertension; Right foot pain; Nondisplaced fracture of fifth right metatarsal bone with routine healing; Acute cystitis without hematuria; Gastroesophageal reflux disease without esophagitis; Gallstones; Right upper quadrant abdominal pain; Nausea; Nausea and vomiting; Herpes zoster with complication; and Neurologic gait dysfunction on their problem list., the following items were addressed: medical records; medications; referrals to community service providers and any changes can be found within the plan section of the note.  A detailed listing of time spent for chronic care management is tracked within each outreach encounter.  Current medications include:  has a current medication list which includes the following prescription(s): acetaminophen, escitalopram, fluticasone, levothyroxine, meloxicam, metoprolol succinate xl, multivitamin with minerals, ondansetron, psyllium, and simvastatin. and the patient is reported to be patient is compliant with medication protocol,  Medications are reported to be effective.  All notes on chart for PCP to review.    The patient was  monitored remotely for pain; blood pressure; blood glucose, nausea,possable UTI.    The patient's physical needs include:  needs met.     The patient's mental support needs include:  needs met; continued support    The patient's cognitive support needs include:  not applicable    The patient's psychosocial support needs include:  needs met    The patient's functional needs include: needs met    The patient's environmental needs include:  not applicable, NA    Care Plan overall comments:  Care plan in place    Refer to previous outreach notes for more information on the areas listed above.    Monthly Billing Diagnoses  (E11.9) Type 2 diabetes mellitus without complication, without long-term current use of insulin (HCC)    (I10) Primary hypertension    Medications   · Medications have been reconciled    Care Plan progress this month:      Recently Modified Care Plans Updates made since 1/28/2023 12:00 AM     Diabetes Type 2 (Adult)         Problem Priority Last Modified     Glycemic Management (Diabetes, Type 2) --  2/10/2023  2:10 PM by Zion Howe MA              Goal Recent Progress Last Modified     Glycemic Management Optimized --  2/10/2023  2:10 PM by Zion Howe MA     Evidence-based guidance:   Anticipate A1C testing (point-of-care) every 3 to 6 months based on goal attainment.   Review mutually-set A1C goal or target range.   Anticipate use of antihyperglycemic with or without insulin and periodic adjustments; consider active involvement of pharmacist.   Provide medical nutrition therapy and development of individualized eating.   Compare self-reported symptoms of hypo or hyperglycemia to blood glucose levels, diet and fluid intake, current medications, psychosocial and physiologic stressors, change in activity and barriers to care adherence.   Promote self-monitoring of blood glucose levels.   Assess and address barriers to management plan, such as food insecurity, age, developmental ability,  depression, anxiety, fear of hypoglycemia or weight gain, as well as medication cost, side effects and complicated regimen.   Consider referral to community-based diabetes education program, visiting nurse, community health worker or health .   Encourage regular dental care for treatment of periodontal disease; refer to dental provider when needed.    Notes:            Task Due Date Last Modified     Alleviate Barriers to Glycemic Management --  2/10/2023  2:11 PM by Zion Howe MA     Care Management Activities:      - A1C testing facilitated  - barriers to adherence to treatment plan identified  - blood glucose monitoring encouraged  - mutual A1C goal set or reviewed  - self-awareness of signs/symptoms of hypo or hyperglycemia encouraged      Notes:              Problem Priority Last Modified     Disease Progression (Diabetes, Type 2) --  2/10/2023  2:10 PM by Zion Howe MA              Goal Recent Progress Last Modified     Disease Progression Prevented or Minimized --  2/10/2023  2:10 PM by Zion Howe MA     Evidence-based guidance:   Prepare patient for laboratory and diagnostic exams based on risk and presentation.   Encourage lifestyle changes, such as increased intake of plant-based foods, stress reduction, consistent physical activity and smoking cessation to prevent long-term complications and chronic disease.    Individualize activity and exercise recommendations while considering potential limitations, such as neuropathy, retinopathy or the ability to prevent hyperglycemia or hypoglycemia.    Prepare patient for use of pharmacologic therapy that may include antihypertensive, analgesic, prostaglandin E1 with periodic adjustments, based on presenting chronic condition and laboratory results.   Assess signs/symptoms and risk factors for hypertension, sleep-disordered breathing, neuropathy (including changes in gait and balance), retinopathy, nephropathy and sexual dysfunction.   Address  pregnancy planning and contraceptive choice, especially when prescribing antihypertensive or statin.   Ensure completion of annual comprehensive foot exam and dilated eye exam.    Implement additional individualized goals and interventions based on identified risk factors.   Prepare patient for consultation or referral for specialist care, such as ophthalmology, neurology, cardiology, podiatry, nephrology or perinatology.    Notes:            Task Due Date Last Modified     Monitor and Manage Follow-up for Comorbidities --  2/10/2023  2:11 PM by Zion Howe MA     Care Management Activities:      - activity based on tolerance and functional limitations encouraged  - healthy lifestyle promoted  - response to pharmacologic therapy monitored      Notes:                      · Current Specialty Plan of Care Status not yet initiated    Instructions   · Patient was provided an electronic copy of care plan  · CCM services were explained and offered and patient has accepted these services.  · Patient has given their written consent to receive CCM services and understands that this includes the authorization of electronic communication of medical information with the other treating providers.  · Patient understands that they may stop CCM services at any time and these changes will be effective at the end of the calendar month and will effectively revocate the agreement of CCM services.  · Patient understands that only one practitioner can furnish and be paid for CCM services during one calendar month.  Patient also understands that there may be co-payment or deductible fees in association with CCM services.  · Patient will continue with at least monthly follow-up calls with the Ambulatory .    JEFFERY YOUNGBLOOD  Ambulatory Case Management    2/28/2023, 14:43 EST    Education Documentation  No documentation found.        JEFFERY YOUNGBLOOD  Ambulatory Case Management    2/28/2023, 14:43 EST

## 2023-04-03 ENCOUNTER — LAB (OUTPATIENT)
Dept: LAB | Facility: HOSPITAL | Age: 78
End: 2023-04-03
Payer: MEDICARE

## 2023-04-03 DIAGNOSIS — I10 PRIMARY HYPERTENSION: ICD-10-CM

## 2023-04-03 DIAGNOSIS — E11.9 TYPE 2 DIABETES MELLITUS WITHOUT COMPLICATION, WITHOUT LONG-TERM CURRENT USE OF INSULIN: ICD-10-CM

## 2023-04-03 LAB
ANION GAP SERPL CALCULATED.3IONS-SCNC: 10 MMOL/L (ref 5–15)
BUN SERPL-MCNC: 14 MG/DL (ref 8–23)
BUN/CREAT SERPL: 16.9 (ref 7–25)
CALCIUM SPEC-SCNC: 9.3 MG/DL (ref 8.6–10.5)
CHLORIDE SERPL-SCNC: 99 MMOL/L (ref 98–107)
CO2 SERPL-SCNC: 28 MMOL/L (ref 22–29)
CREAT SERPL-MCNC: 0.83 MG/DL (ref 0.57–1)
EGFRCR SERPLBLD CKD-EPI 2021: 71.8 ML/MIN/1.73
GLUCOSE SERPL-MCNC: 104 MG/DL (ref 65–99)
HBA1C MFR BLD: 6.1 % (ref 4.8–5.6)
POTASSIUM SERPL-SCNC: 4.5 MMOL/L (ref 3.5–5.2)
SODIUM SERPL-SCNC: 137 MMOL/L (ref 136–145)

## 2023-04-03 PROCEDURE — 80048 BASIC METABOLIC PNL TOTAL CA: CPT

## 2023-04-03 PROCEDURE — 83036 HEMOGLOBIN GLYCOSYLATED A1C: CPT

## 2023-04-03 PROCEDURE — 36415 COLL VENOUS BLD VENIPUNCTURE: CPT

## 2023-04-10 RX ORDER — ESCITALOPRAM OXALATE 5 MG/1
TABLET ORAL
Qty: 90 TABLET | Refills: 1 | Status: SHIPPED | OUTPATIENT
Start: 2023-04-10

## 2023-04-11 ENCOUNTER — TELEPHONE (OUTPATIENT)
Dept: INTERNAL MEDICINE | Facility: CLINIC | Age: 78
End: 2023-04-11
Payer: MEDICARE

## 2023-04-11 DIAGNOSIS — R30.0 DYSURIA: Primary | ICD-10-CM

## 2023-04-11 LAB
AMORPH URATE CRY URNS QL MICRO: ABNORMAL /HPF
BACTERIA UR QL AUTO: ABNORMAL /HPF
BILIRUB UR QL STRIP: NEGATIVE
CLARITY UR: ABNORMAL
COLOR UR: YELLOW
GLUCOSE UR STRIP-MCNC: NEGATIVE MG/DL
HGB UR QL STRIP.AUTO: NEGATIVE
HYALINE CASTS UR QL AUTO: ABNORMAL /LPF
KETONES UR QL STRIP: NEGATIVE
LEUKOCYTE ESTERASE UR QL STRIP.AUTO: ABNORMAL
NITRITE UR QL STRIP: POSITIVE
PH UR STRIP.AUTO: 6.5 [PH] (ref 5–8)
PROT UR QL STRIP: ABNORMAL
RBC # UR STRIP: ABNORMAL /HPF
REF LAB TEST METHOD: ABNORMAL
SP GR UR STRIP: 1.02 (ref 1–1.03)
SQUAMOUS #/AREA URNS HPF: ABNORMAL /HPF
UROBILINOGEN UR QL STRIP: ABNORMAL
WBC # UR STRIP: ABNORMAL /HPF

## 2023-04-11 PROCEDURE — 87086 URINE CULTURE/COLONY COUNT: CPT | Performed by: INTERNAL MEDICINE

## 2023-04-11 PROCEDURE — 81001 URINALYSIS AUTO W/SCOPE: CPT | Performed by: INTERNAL MEDICINE

## 2023-04-11 RX ORDER — NITROFURANTOIN 25; 75 MG/1; MG/1
100 CAPSULE ORAL 2 TIMES DAILY
Qty: 10 CAPSULE | Refills: 0 | Status: SHIPPED | OUTPATIENT
Start: 2023-04-11 | End: 2023-04-16

## 2023-04-11 NOTE — TELEPHONE ENCOUNTER
Caller: MARINA BANUELOS    Relationship: Emergency Contact    Best call back number: [phone]    What orders are you requesting (i.e. lab or imaging): URINALYSIS    In what timeframe would the patient need to come in: ASAP    Where will you receive your lab/imaging services: [facility]    Additional notes: PATIENTS DAUGHTER CALLED STATING THAT SHE WOULD LIKE TO  A URINE SAMPLE CUP TO TAKE TO HER MOTHER SO A URINALYSIS CAN BE DONE

## 2023-04-11 NOTE — TELEPHONE ENCOUNTER
Pt states she had woken up this morning and her bed was full of urine. Pt states daughter will be bringing in a specimen cup, needing order for lab to check for uti. Please advise.

## 2023-04-12 ENCOUNTER — PATIENT OUTREACH (OUTPATIENT)
Dept: CASE MANAGEMENT | Facility: OTHER | Age: 78
End: 2023-04-12
Payer: MEDICARE

## 2023-04-12 DIAGNOSIS — I10 PRIMARY HYPERTENSION: ICD-10-CM

## 2023-04-12 DIAGNOSIS — E11.9 TYPE 2 DIABETES MELLITUS WITHOUT COMPLICATION, WITHOUT LONG-TERM CURRENT USE OF INSULIN: Primary | ICD-10-CM

## 2023-04-12 NOTE — OUTREACH NOTE
AMBULATORY CASE MANAGEMENT NOTE    Name and Relationship of Patient/Support Person:  -     CCM Interim Update           CM outreached to patient . Patient stated that she had got up on 4-11-23 and had urinated on herself while in bed. She stated this was not normal and was concerned that she may have a UTI . Patient supplied sample to the lab for diagnostics . The lab results returned the following results. 1 Result Note         Component  Ref Range & Units 1 d ago 4 mo ago 7 mo ago 2 yr ago   RBC, UA  None Seen, 0-2 /HPF 0-2  6-12 Abnormal   0-2 Abnormal  R  NONE SEEN R    WBC, UA  None Seen, 0-2 /HPF 21-30 Abnormal   31-50 Abnormal   21-30 Abnormal  R  OCC (2-5) Abnormal  R    Bacteria, UA  None Seen /HPF 4+ Abnormal   4+ Abnormal   1+ Abnormal      Squamous Epithelial Cells, UA  None Seen, 0-2 /HPF 0-2  0-2  0-2     Hyaline Casts, UA  None Seen /LPF 0-2  None Seen  0-2     Amorphous Crystals, UA  None Seen /HPF Small/1+       Methodology Manual Light Microscopy  Automated Microscopy  Automated Microscopy     [facility]          Results were shared with the patient . Patient stated understanding. I also informed the patient that the PCP had sent script for Microbid to her pharmacy . The patient stated she would  meds and start today with meds as prescribed. The patient stated that the day prior she had experienced a weak spell while in the bathroom and sat on the floor for a while until it passed. Patient stated she did not check her blood sugar nor her BP at this time. Patient states this has not happened before. Patient states she feels she drinks enough fluids as not to become dehydrated. Patient states that if she should have another episode like this she will take her BB and seek emergency help if needed.   .Chart reviewed , meds reviewed, patient stated no further needs at this time.All future appts reviewed . All questions were answered and contact information provided .  Future outreach scheduled .         Education Documentation  No documentation found.        JEFFERY YOUNGBLOOD  Ambulatory Case Management    4/12/2023, 11:17 EDT

## 2023-04-12 NOTE — TELEPHONE ENCOUNTER
CM reached out to the patient , patient was advised of lab results as well as meds sent to pharmacy .

## 2023-04-13 LAB — BACTERIA SPEC AEROBE CULT: ABNORMAL

## 2023-04-23 PROBLEM — S92.354D NONDISPLACED FRACTURE OF FIFTH RIGHT METATARSAL BONE WITH ROUTINE HEALING: Status: RESOLVED | Noted: 2022-04-25 | Resolved: 2023-04-23

## 2023-04-23 PROBLEM — M79.671 RIGHT FOOT PAIN: Status: RESOLVED | Noted: 2022-04-05 | Resolved: 2023-04-23

## 2023-04-23 PROBLEM — N30.00 ACUTE CYSTITIS WITHOUT HEMATURIA: Status: RESOLVED | Noted: 2022-08-25 | Resolved: 2023-04-23

## 2023-04-23 PROBLEM — M65.30 TRIGGERING OF DIGIT: Status: RESOLVED | Noted: 2018-05-10 | Resolved: 2023-04-23

## 2023-04-23 PROBLEM — B02.8 HERPES ZOSTER WITH COMPLICATION: Status: RESOLVED | Noted: 2022-09-21 | Resolved: 2023-04-23

## 2023-04-23 PROBLEM — R11.0 NAUSEA: Status: RESOLVED | Noted: 2022-08-29 | Resolved: 2023-04-23

## 2023-04-23 PROBLEM — Z00.00 MEDICARE ANNUAL WELLNESS VISIT, SUBSEQUENT: Status: ACTIVE | Noted: 2023-04-23

## 2023-04-24 ENCOUNTER — OFFICE VISIT (OUTPATIENT)
Dept: INTERNAL MEDICINE | Facility: CLINIC | Age: 78
End: 2023-04-24
Payer: MEDICARE

## 2023-04-24 ENCOUNTER — HOSPITAL ENCOUNTER (OUTPATIENT)
Dept: GENERAL RADIOLOGY | Facility: HOSPITAL | Age: 78
Discharge: HOME | End: 2023-04-24
Payer: MEDICARE

## 2023-04-24 VITALS
HEIGHT: 62 IN | SYSTOLIC BLOOD PRESSURE: 138 MMHG | DIASTOLIC BLOOD PRESSURE: 68 MMHG | HEART RATE: 67 BPM | BODY MASS INDEX: 33.01 KG/M2 | TEMPERATURE: 97.1 F | OXYGEN SATURATION: 97 % | WEIGHT: 179.4 LBS

## 2023-04-24 DIAGNOSIS — R26.89 SHUFFLING GAIT: ICD-10-CM

## 2023-04-24 DIAGNOSIS — M54.40 ACUTE LEFT-SIDED LOW BACK PAIN WITH SCIATICA, SCIATICA LATERALITY UNSPECIFIED: ICD-10-CM

## 2023-04-24 DIAGNOSIS — E11.9 TYPE 2 DIABETES MELLITUS WITHOUT COMPLICATION, WITHOUT LONG-TERM CURRENT USE OF INSULIN: ICD-10-CM

## 2023-04-24 DIAGNOSIS — I10 PRIMARY HYPERTENSION: ICD-10-CM

## 2023-04-24 DIAGNOSIS — M81.0 POSTMENOPAUSAL BONE LOSS: ICD-10-CM

## 2023-04-24 DIAGNOSIS — R26.9 NEUROLOGIC GAIT DYSFUNCTION: ICD-10-CM

## 2023-04-24 DIAGNOSIS — Z00.00 MEDICARE ANNUAL WELLNESS VISIT, SUBSEQUENT: Primary | ICD-10-CM

## 2023-04-24 DIAGNOSIS — R26.9 NEUROLOGIC GAIT DYSFUNCTION: Primary | ICD-10-CM

## 2023-04-24 DIAGNOSIS — E78.2 MIXED HYPERLIPIDEMIA: ICD-10-CM

## 2023-04-24 DIAGNOSIS — R30.0 DYSURIA: ICD-10-CM

## 2023-04-24 DIAGNOSIS — E03.4 ACQUIRED ATROPHY OF THYROID: ICD-10-CM

## 2023-04-24 PROCEDURE — 72100 X-RAY EXAM L-S SPINE 2/3 VWS: CPT

## 2023-04-24 PROCEDURE — 73502 X-RAY EXAM HIP UNI 2-3 VIEWS: CPT

## 2023-04-24 RX ORDER — GABAPENTIN 100 MG/1
CAPSULE ORAL
Qty: 60 CAPSULE | Refills: 2 | Status: SHIPPED | OUTPATIENT
Start: 2023-04-24

## 2023-04-24 NOTE — ASSESSMENT & PLAN NOTE
JUSTIN completed 4/23.  Patient is having some progressive weakness, particularly of lower extremities, utilizing a rollator nowadays, but no bad falls, no hospitalizations past year.  She does have a living will, it was on file at the hospital previously, she will get us another copy on return to office.

## 2023-04-24 NOTE — ASSESSMENT & PLAN NOTE
This is more of an issue as of her 4/23 office visit.  She was previously utilizing cane when she was out, she feels safer with the rollator presently.  No recent falls per se, but patient and increased with skin also with left-sided back pain coming down the left leg.  Organ to get x-rays of her lumbar spine and the left hip initially, patient will utilize over-the-counter Tylenol etc. for now.  Some issues in regards to her gait and balance deserve evaluation by neurology in the near future.  Patient to call if she has new symptoms.  Of note, patient did benefit some from outpatient PT around 10/22.

## 2023-04-24 NOTE — ASSESSMENT & PLAN NOTE
Patient's A1c is stable at 6.1 as of her 4/23 office visit.  Still no indication for medication obviously, will continue to keep an eye on this though.

## 2023-04-24 NOTE — PROGRESS NOTES
"Chief Complaint  Hyperlipidemia, Follow-up (Pt states that this is routine, she had labs. ), and low back pain (Pt states that she has low back pain on the left side and it radiates down into her left leg. Her right leg from knee down is like pin needles in it. /She states that her walking is worse and her legs don't work like they use to. )    Subjective          Kelsey Durand presents to [facility] INTER[facility]     History of present illness:  Patient is a pleasant 78-year-old female with underlying hyperlipidemia, hypothyroidism, IFG versus diabetes, among others, who is coming in 4/23 for her routine 4-month follow-up.  We will review her medication list, go over her labs in detail, and address other concerns at that time.    Review of Systems   Constitutional: Negative for appetite change, fatigue and fever.   HENT: Negative for congestion and ear pain.    Eyes: Negative for blurred vision.   Respiratory: Negative for cough, chest tightness, shortness of breath and wheezing.    Cardiovascular: Negative for chest pain, palpitations and leg swelling.   Gastrointestinal: Negative for abdominal pain.   Genitourinary: Negative for difficulty urinating, dysuria and hematuria.   Musculoskeletal: Negative for arthralgias and gait problem.   Skin: Negative for skin lesions.   Neurological: Negative for syncope, memory problem and confusion.   Psychiatric/Behavioral: Negative for self-injury and depressed mood.       Objective   Vital Signs:   /68   Pulse 67   Temp 97.1 °F (36.2 °C) (Skin)   Ht 157.5 cm (62.01\")   Wt 81.4 kg (179 lb 6.4 oz)   SpO2 97%   BMI 32.80 kg/m²           Physical Exam  Vitals and nursing note reviewed.   Constitutional:       General: She is not in acute distress.     Appearance: Normal appearance. She is not toxic-appearing.   HENT:      Head: Atraumatic.      Right Ear: External ear normal.      Left Ear: External ear normal.      Nose: Nose normal.      " Mouth/Throat:      Mouth: Mucous membranes are moist.   Eyes:      General:         Right eye: No discharge.         Left eye: No discharge.      Extraocular Movements: Extraocular movements intact.      Pupils: Pupils are equal, round, and reactive to light.   Cardiovascular:      Rate and Rhythm: Normal rate and regular rhythm.      Pulses: Normal pulses.      Heart sounds: Normal heart sounds. No murmur heard.    No gallop.   Pulmonary:      Effort: Pulmonary effort is normal. No respiratory distress.      Breath sounds: No wheezing, rhonchi or rales.   Abdominal:      General: There is no distension.      Palpations: Abdomen is soft. There is no mass.      Tenderness: There is no abdominal tenderness. There is no guarding.   Musculoskeletal:         General: No swelling or tenderness.      Cervical back: No tenderness.      Right lower leg: No edema.      Left lower leg: No edema.   Skin:     General: Skin is warm and dry.      Findings: No rash.   Neurological:      General: No focal deficit present.      Mental Status: She is alert and oriented to person, place, and time. Mental status is at baseline.      Motor: No weakness.      Gait: Gait normal.   Psychiatric:         Mood and Affect: Mood normal.         Thought Content: Thought content normal.          Result Review :   The following data was reviewed by: Chris Elder MD on 10/18/2021:                  Assessment and Plan    Diagnoses and all orders for this visit:    1. Medicare annual wellness visit, subsequent (Primary)  Assessment & Plan:  AWV completed 4/23.  Patient is having some progressive weakness, particularly of lower extremities, utilizing a rollator nowadays, but no bad falls, no hospitalizations past year.  She does have a living will, it was on file at the hospital previously, she will get us another copy on return to office.      2. Primary hypertension  Assessment & Plan:  Blood pressure stable as of her 4/23 office visit.  Heart rate  is in the 60s, she is stable to continue with just very low-dose metoprolol.    Orders:  -     Comprehensive Metabolic Panel; Future    3. Type 2 diabetes mellitus without complication, without long-term current use of insulin  Assessment & Plan:  Patient's A1c is stable at 6.1 as of her 4/23 office visit.  Still no indication for medication obviously, will continue to keep an eye on this though.    Orders:  -     Hemoglobin A1c; Future    4. Mixed hyperlipidemia  Assessment & Plan:  Patient was at goal in 12/22, continue just low-dose simvastatin, labs on return to office late summer    Orders:  -     Lipid Panel; Future    5. Acquired atrophy of thyroid  Assessment & Plan:  Patient clinically euthyroid, continue same dosing, labs on return to office late summer.    Orders:  -     TSH; Future    6. Dysuria  -     Urinalysis With Culture If Indicated -; Future    7. Postmenopausal bone loss  -     DEXA Bone Density Axial; Future    8. Acute left-sided low back pain with sciatica, sciatica laterality unspecified  -     XR Spine Lumbar 2 or 3 View; Future  -     XR Hip With or Without Pelvis 2 - 3 View Left; Future    9. Neurologic gait dysfunction  Assessment & Plan:  This is more of an issue as of her 4/23 office visit.  She was previously utilizing cane when she was out, she feels safer with the rollator presently.  No recent falls per se, but patient and increased with skin also with left-sided back pain coming down the left leg.  Organ to get x-rays of her lumbar spine and the left hip initially, patient will utilize over-the-counter Tylenol etc. for now.  Some issues in regards to her gait and balance deserve evaluation by neurology in the near future.  Patient to call if she has new symptoms.  Of note, patient did benefit some from outpatient PT around 10/22.    Orders:  -     Ambulatory Referral to Neurology    10. Shuffling gait  -     Ambulatory Referral to Neurology     --  --  OLDER NOTES:  VISIT  "4/21:  ANNUAL MEDICARE WELLNESS PHYSICAL 4/19 = reviewed all forms in office with pt; no new discoveries; active at mall=class and walks/ bike at home.  H.M. ISSUES:  BMI 30 and d/w watch carbs and walk.  --  \"HYPERTENSION\" controlled=better at home='s (off meds good while)---> d/w check occ and let us know=fine at home as of 4/21.    LIPIDS at goal with LDL 68...101 so f/u before change...93 fine---> 80 stable 4/21.  --  HYPOTHYROIDISM stable 10/16 with TSH 0.2 still=on same dose many years---> fine 4/21.  IFG mild/stable = FBS 92 with A1C 6.0---> 6.3 in 4/20---> 6.0 in 4/21.  --  ANXIETY D/O 1/19 and has benzo to use PRN; d/w call if feels she needs to use it too often and will add SSRI...cheerful 10/19...will use low dose lexapro as of 4/20 OV---> seems better 10/20.  B12 YVZ=061 in 4/20.  --  SYNCOPE is ? seizure per Dr Valadez/Flash in [facility] over the weekend, so will get EEG (tongue bite/incontinent)...EEG was neg 9/15.  --  BMD 5/17 = spine 0.9, hip 0.6 is great---> BMD 7/20 = spine 1.0, hip -0.3 = great!!  VIT D DEF remains stable on 2000 qd...28 and d/w take routinely please...44---> same 10/20.  --  DJD no new c/o.  R KNEE PAIN with no trauma, comes and goes, exam is w/o laxity, so agree with repeat mobic and use brace and then PTA if no better.  --  A.R. no flare.  --  --  MMG 4/29/21 per Dr Lawler/Fabiola Pacheco.  COLON 11/19...polyp/FH+ = daughter = 5 years per Dr. Streeter  Adacel 3/14, Prevnar '16; Pneumovax #1 10/17; Hep A x2 10/18; d/w Shingrix 10/18;   ( Samuel 7/13, 2 girls here=Leigh Ann (no kids) and Xiao = youngest is boy in 9th grade 12/22 and girl with Asperger's is at [facility]).    Follow Up   No follow-ups on file.  Patient was given instructions and counseling regarding her condition or for health maintenance advice. Please see specific information pulled into the AVS if appropriate.       Answers for HPI/ROS submitted by the patient on 4/22/2023  Please describe your symptoms.: My legs " hurt then after i get up they start to feel better. Sometimes the lower part of my leg hurt then it stops., Off and on. Take Tylenol , My right heel hurts. It easies up after a while. Had another UTI. You gave me meds  Have you had these symptoms before?: Yes  How long have you been having these symptoms?: Greater than 2 weeks  Please list any medications you are currently taking for this condition.: Tylenol  Please describe any probable cause for these symptoms. : Old age  What is the primary reason for your visit?: Other

## 2023-04-24 NOTE — ASSESSMENT & PLAN NOTE
Patient was at goal in 12/22, continue just low-dose simvastatin, labs on return to office late summer

## 2023-04-24 NOTE — ASSESSMENT & PLAN NOTE
Blood pressure stable as of her 4/23 office visit.  Heart rate is in the 60s, she is stable to continue with just very low-dose metoprolol.

## 2023-04-24 NOTE — PROGRESS NOTES
The ABCs of the Annual Wellness Visit  Subsequent Medicare Wellness Visit    Subjective      Kelsey Durand is a 78 y.o. female who presents for a Subsequent Medicare Wellness Visit.    The following portions of the patient's history were reviewed and   updated as appropriate: allergies, current medications, past family history, past medical history, past social history, past surgical history and problem list.    Compared to one year ago, the patient feels her physical   health is worse.    Compared to one year ago, the patient feels her mental   health is the same.    Recent Hospitalizations:  She was not admitted to the hospital during the last year.       Current Medical Providers:  Patient Care Team:  Chris Elder MD as PCP - General (Internal Medicine)  Zion Howe MA as Ambulatory  Assistant ([facility])    Outpatient Medications Prior to Visit   Medication Sig Dispense Refill   • acetaminophen (TYLENOL) 500 MG tablet Take 1 tablet by mouth Every 6 (Six) Hours As Needed for Mild Pain.     • escitalopram (LEXAPRO) 5 MG tablet TAKE 1 TABLET BY MOUTH EVERY DAY 90 tablet 1   • fluticasone (FLONASE) 50 MCG/ACT nasal spray 2 sprays by Each Nare route As Needed.     • levothyroxine (SYNTHROID, LEVOTHROID) 100 MCG tablet Take 1 tablet by mouth Every Morning. 90 tablet 1   • metoprolol succinate XL (TOPROL-XL) 25 MG 24 hr tablet Take 1 tablet by mouth Daily. 90 tablet 1   • multivitamin with minerals tablet tablet Take 1 tablet by mouth Daily.     • ondansetron (Zofran) 4 MG tablet Take 1 tablet by mouth Every 8 (Eight) Hours As Needed for Nausea or Vomiting. 30 tablet 0   • psyllium (METAMUCIL) 0.52 g capsule Take 1 capsule by mouth Daily.     • simvastatin (ZOCOR) 10 MG tablet Take 1 tablet by mouth Every Other Day. 90 tablet 1   • meloxicam (Mobic) 7.5 MG tablet 2 tablets once daily with food for a week, then 1 tablet once daily with food for a week. 21 tablet 0     No  "facility-administered medications prior to visit.       No opioid medication identified on active medication list. I have reviewed chart for other potential  high risk medication/s and harmful drug interactions in the elderly.          Aspirin is not on active medication list.  Aspirin use is not indicated based on review of current medical condition/s. Risk of harm outweighs potential benefits.  .    Patient Active Problem List   Diagnosis   • Acquired atrophy of thyroid   • Mixed hyperlipidemia   • Vitamin D deficiency   • Type 2 diabetes mellitus without complication, without long-term current use of insulin   • Primary osteoarthritis involving multiple joints   • Hospital discharge follow-up   • Recurrent major depression in partial remission   • Primary hypertension   • Gastroesophageal reflux disease without esophagitis   • Gallstones   • Right upper quadrant abdominal pain   • Nausea and vomiting   • Neurologic gait dysfunction   • Medicare annual wellness visit, subsequent     Advance Care Planning   Advance Care Planning     Advance Directive is not on file.  ACP discussion was held with the patient during this visit. Patient has an advance directive (not in EMR), copy requested.     Objective    Vitals:    04/24/23 1017   BP: 138/68   Pulse: 67   Temp: 97.1 °F (36.2 °C)   TempSrc: Skin   SpO2: 97%   Weight: 81.4 kg (179 lb 6.4 oz)   Height: 157.5 cm (62.01\")     Estimated body mass index is 32.8 kg/m² as calculated from the following:    Height as of this encounter: 157.5 cm (62.01\").    Weight as of this encounter: 81.4 kg (179 lb 6.4 oz).    BMI is >= 30 and <35. (Class 1 Obesity). The following options were offered after discussion;: nutrition counseling/recommendations      Does the patient have evidence of cognitive impairment?   No    Lab Results   Component Value Date    HGBA1C 6.10 (H) 04/03/2023          HEALTH RISK ASSESSMENT    Smoking Status:  Social History     Tobacco Use   Smoking Status " Never   • Passive exposure: Yes   Smokeless Tobacco Never     Alcohol Consumption:  Social History     Substance and Sexual Activity   Alcohol Use Not Currently     Fall Risk Screen:    ÁLVARO Fall Risk Assessment was completed, and patient is at HIGH risk for falls. Assessment completed on:2023    Depression Screenin/24/2023    10:20 AM   PHQ-2/PHQ-9 Depression Screening   Little Interest or Pleasure in Doing Things 0-->not at all   Feeling Down, Depressed or Hopeless 0-->not at all   PHQ-9: Brief Depression Severity Measure Score 0       Health Habits and Functional and Cognitive Screenin/24/2023    10:00 AM   Functional & Cognitive Status   Do you have difficulty preparing food and eating? No   Do you have difficulty bathing yourself, getting dressed or grooming yourself? No   Do you have difficulty using the toilet? No   Do you have difficulty moving around from place to place? Yes   Do you have trouble with steps or getting out of a bed or a chair? Yes   Current Diet Well Balanced Diet   Do you need help using the phone?  No   Are you deaf or do you have serious difficulty hearing?  No   Do you need help with transportation? No   Do you need help shopping? No   Do you need help preparing meals?  No   Do you need help with housework?  No   Do you need help with laundry? No   Do you need help taking your medications? No   Do you need help managing money? No   Do you ever drive or ride in a car without wearing a seat belt? No   Do you have difficulty concentrating, remembering or making decisions? No       Age-appropriate Screening Schedule:  Refer to the list below for future screening recommendations based on patient's age, sex and/or medical conditions. Orders for these recommended tests are listed in the plan section. The patient has been provided with a written plan.    Health Maintenance   Topic Date Due   • DXA SCAN  2022   • DIABETIC EYE EXAM  2022   • ANNUAL WELLNESS  VISIT  02/10/2023   • INFLUENZA VACCINE  08/01/2023   • HEMOGLOBIN A1C  10/03/2023   • LIPID PANEL  12/06/2023   • URINE MICROALBUMIN  12/06/2023   • TDAP/TD VACCINES (2 - Td or Tdap) 03/13/2024   • COLORECTAL CANCER SCREENING  11/07/2024   • COVID-19 Vaccine  Completed   • Pneumococcal Vaccine 65+  Completed   • ZOSTER VACCINE  Completed                  CMS Preventative Services Quick Reference  Risk Factors Identified During Encounter:    None Identified    The above risks/problems have been discussed with the patient.  Pertinent information has been shared with the patient in the After Visit Summary.    Diagnoses and all orders for this visit:    1. Medicare annual wellness visit, subsequent (Primary)  Assessment & Plan:  AWV completed 4/23.  Patient is having some progressive weakness, particularly of lower extremities, utilizing a rollator nowadays, but no bad falls, no hospitalizations past year.  She does have a living will, it was on file at the hospital previously, she will get us another copy on return to office.      2. Primary hypertension  Assessment & Plan:  Blood pressure stable as of her 4/23 office visit.  Heart rate is in the 60s, she is stable to continue with just very low-dose metoprolol.    Orders:  -     Comprehensive Metabolic Panel; Future    3. Type 2 diabetes mellitus without complication, without long-term current use of insulin  Assessment & Plan:  Patient's A1c is stable at 6.1 as of her 4/23 office visit.  Still no indication for medication obviously, will continue to keep an eye on this though.    Orders:  -     Hemoglobin A1c; Future    4. Mixed hyperlipidemia  Assessment & Plan:  Patient was at goal in 12/22, continue just low-dose simvastatin, labs on return to office late summer    Orders:  -     Lipid Panel; Future    5. Acquired atrophy of thyroid  Assessment & Plan:  Patient clinically euthyroid, continue same dosing, labs on return to office late summer.    Orders:  -      TSH; Future    6. Dysuria  -     Urinalysis With Culture If Indicated -; Future    7. Postmenopausal bone loss  -     DEXA Bone Density Axial; Future      Follow Up:   Next Medicare Wellness visit to be scheduled in 1 year.      An After Visit Summary and PPPS were made available to the patient.

## 2023-04-27 ENCOUNTER — PATIENT OUTREACH (OUTPATIENT)
Dept: CASE MANAGEMENT | Facility: OTHER | Age: 78
End: 2023-04-27
Payer: MEDICARE

## 2023-04-27 DIAGNOSIS — E03.4 ACQUIRED ATROPHY OF THYROID: ICD-10-CM

## 2023-04-27 DIAGNOSIS — E11.9 TYPE 2 DIABETES MELLITUS WITHOUT COMPLICATION, WITHOUT LONG-TERM CURRENT USE OF INSULIN: Primary | ICD-10-CM

## 2023-04-27 DIAGNOSIS — I10 PRIMARY HYPERTENSION: ICD-10-CM

## 2023-04-27 DIAGNOSIS — E78.2 MIXED HYPERLIPIDEMIA: ICD-10-CM

## 2023-04-27 NOTE — OUTREACH NOTE
AMBULATORY CASE MANAGEMENT NOTE    Name and Relationship of Patient/Support Person:  -     CCM Interim Update    [facility] End of Month Documentation    This Chronic Medical Management Care Plan for Kelsey Durand, 78 y.o. female, has been monitored and managed; reviewed and a new plan of care implemented for the month of April.  A cumulative time of 29  minutes was spent on this patient record this month, including chart review; phone call with patient, Med education, labs discussed.    Regarding the patient's problems: has Acquired atrophy of thyroid; Mixed hyperlipidemia; Vitamin D deficiency; Type 2 diabetes mellitus without complication, without long-term current use of insulin; Primary osteoarthritis involving multiple joints; Hospital discharge follow-up; Recurrent major depression in partial remission; Primary hypertension; Gastroesophageal reflux disease without esophagitis; Gallstones; Right upper quadrant abdominal pain; Nausea and vomiting; Neurologic gait dysfunction; and Medicare annual wellness visit, subsequent on their problem list., the following items were addressed: medical records; medications; referrals to community service providers and any changes can be found within the plan section of the note.  A detailed listing of time spent for chronic care management is tracked within each outreach encounter.  Current medications include:  has a current medication list which includes the following prescription(s): acetaminophen, escitalopram, fluticasone, gabapentin, levothyroxine, metoprolol succinate xl, multivitamin with minerals, ondansetron, psyllium, and simvastatin. and the patient is reported to be patient is compliant with medication protocol,  Medications are reported to be effective.  All notes on chart for PCP to review.    The patient was monitored remotely for pain; blood pressure; blood glucose, nausea,possable UTI.    The patient's physical needs include:  needs met.     The patient's  mental support needs include:  needs met; continued support    The patient's cognitive support needs include:  not applicable    The patient's psychosocial support needs include:  needs met    The patient's functional needs include: needs met    The patient's environmental needs include:  not applicable, NA    Care Plan overall comments:  Care plan in place    Refer to previous outreach notes for more information on the areas listed above.    Monthly Billing Diagnoses  (E11.9) Type 2 diabetes mellitus without complication, without long-term current use of insulin    (I10) Primary hypertension    (E78.2) Mixed hyperlipidemia    (E03.4) Acquired atrophy of thyroid    Medications   · Medications have been reconciled    Care Plan progress this month:      Recently Modified Care Plans Updates made since 3/27/2023 12:00 AM    No recently modified care plans.          · Current Specialty Plan of Care Status in process    Instructions   · Patient was provided an electronic copy of care plan  · CCM services were explained and offered and patient has accepted these services.  · Patient has given their written consent to receive CCM services and understands that this includes the authorization of electronic communication of medical information with the other treating providers.  · Patient understands that they may stop CCM services at any time and these changes will be effective at the end of the calendar month and will effectively revocate the agreement of CCM services.  · Patient understands that only one practitioner can furnish and be paid for CCM services during one calendar month.  Patient also understands that there may be co-payment or deductible fees in association with CCM services.  · Patient will continue with at least monthly follow-up calls with the Ambulatory .    JEFFERY YOUNGBLOOD  Ambulatory Case Management    4/27/2023, 08:57 EDT        Education Documentation  No documentation found.        JEFFERY YOUNGBLOOD  Ambulatory  Case Management    4/27/2023, 08:57 EDT

## 2023-04-28 RX ORDER — LEVOTHYROXINE SODIUM 0.1 MG/1
100 TABLET ORAL EVERY MORNING
Qty: 90 TABLET | Refills: 1 | Status: SHIPPED | OUTPATIENT
Start: 2023-04-28

## 2023-04-28 RX ORDER — SIMVASTATIN 10 MG
10 TABLET ORAL EVERY OTHER DAY
Qty: 45 TABLET | Refills: 1 | Status: SHIPPED | OUTPATIENT
Start: 2023-04-28

## 2023-05-02 ENCOUNTER — OFFICE VISIT (OUTPATIENT)
Dept: NEUROLOGY | Facility: CLINIC | Age: 78
End: 2023-05-02
Payer: MEDICARE

## 2023-05-02 VITALS
HEIGHT: 62 IN | HEART RATE: 63 BPM | SYSTOLIC BLOOD PRESSURE: 129 MMHG | WEIGHT: 176 LBS | BODY MASS INDEX: 32.39 KG/M2 | DIASTOLIC BLOOD PRESSURE: 80 MMHG

## 2023-05-02 DIAGNOSIS — G62.9 SENSORY NEUROPATHY: Primary | ICD-10-CM

## 2023-05-02 PROCEDURE — 1160F RVW MEDS BY RX/DR IN RCRD: CPT | Performed by: PSYCHIATRY & NEUROLOGY

## 2023-05-02 PROCEDURE — 3074F SYST BP LT 130 MM HG: CPT | Performed by: PSYCHIATRY & NEUROLOGY

## 2023-05-02 PROCEDURE — 1159F MED LIST DOCD IN RCRD: CPT | Performed by: PSYCHIATRY & NEUROLOGY

## 2023-05-02 PROCEDURE — 99203 OFFICE O/P NEW LOW 30 MIN: CPT | Performed by: PSYCHIATRY & NEUROLOGY

## 2023-05-02 PROCEDURE — 3079F DIAST BP 80-89 MM HG: CPT | Performed by: PSYCHIATRY & NEUROLOGY

## 2023-05-02 NOTE — PROGRESS NOTES
"Chief Complaint  Gait Problem    Subjective          Kelsey Durand is a 78 y.o. female who presents to [facility]  History of Present Illness  78-year-old woman evaluated for gait abnormalities.  She states that she has had gait abnormality since 2017.  She tells me a story that she was at a [employer] ringing for the [employer] she froze up.  Since that time she has had gait abnormalities.  She states that once in a while she has a difficult time moving her legs intermittently.  She states that she uses a cane 3-4 times a day at home because she has a cat.  Most of the time she has holds onto the furniture.  When she is out in [facility] she holds onto the grocery cart and she is able to do groceries without any problems.  She uses a rollator walker when she is out like today.  There is a question of her feet shuffling.  She states this has been doing this since she is afraid to fall if she does not hold onto anything.  She has no bradykinesia.  She is independent with activities of daily living.  She lives by herself.  She is driving.  She has a history of diabetes.    Objective   Vital Signs:   /80   Pulse 63   Ht 157.5 cm (62.01\")   Wt 79.8 kg (176 lb)   BMI 32.18 kg/m²     Physical Exam   She is alert, fluent, phasic, follows commands well.  EOMs are full all directions of gaze, facial strength is full, soft palate elevation and tongue are normal.  There is no facial bradykinesia.  There is no bradykinesia of the upper or lower extremities.  There is no rigidity or cogwheeling.  Reflexes are symmetrically decreased in the biceps triceps patellar's and absent in the ankles.  Sensations decreased to pinprick in a stocking distribution to the midfoot vibration is impaired higher than the ankles left greater than the right.  Romberg is positive.  On Station and gait she is holding onto the furniture in the office and would not let go.  She is afraid to " let go.  She is able to walk steadily with a walker without any gait abnormalities.        Assessment and Plan  Diagnoses and all orders for this visit:    1. Sensory neuropathy (Primary)  Assessment & Plan:  She has severe sensory neuropathy with loss of proprioception most likely secondary to diabetic neuropathy.  I discussed with her that the treatment is to avoid falls by using a cane, walker or holding onto objects and to avoid uneven surfaces, steps, inclines.  I discussed with her that she does not have Parkinson's syndrome or disease.         Total time spent with the patient and coordinating patient care was 35 minutes.    Follow Up  No follow-ups on file.  Patient was given instructions and counseling regarding her condition or for health maintenance advice. Please see specific information pulled into the AVS if appropriate.

## 2023-05-02 NOTE — ASSESSMENT & PLAN NOTE
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  I discussed with her that the treatment is to avoid falls by using a cane, walker or holding onto objects and to avoid uneven surfaces, steps, inclines.  I discussed with her that she does not have Parkinson's syndrome or disease.  I would recommend for laboratory work-up such as vitamin B12 and folate as well as immunofixation electrophoresis to be added to her next labs.  Thank you for let me participate in her care

## 2023-05-04 ENCOUNTER — HOSPITAL ENCOUNTER (OUTPATIENT)
Dept: MAMMOGRAPHY | Facility: HOSPITAL | Age: 78
Discharge: HOME | End: 2023-05-04
Admitting: OBSTETRICS & GYNECOLOGY
Payer: MEDICARE

## 2023-05-04 DIAGNOSIS — Z12.39 SCREENING BREAST EXAMINATION: ICD-10-CM

## 2023-05-04 PROCEDURE — 77067 SCR MAMMO BI INCL CAD: CPT

## 2023-05-04 PROCEDURE — 77063 BREAST TOMOSYNTHESIS BI: CPT

## 2023-05-11 ENCOUNTER — HOSPITAL ENCOUNTER (OUTPATIENT)
Dept: BONE DENSITY | Facility: HOSPITAL | Age: 78
Discharge: HOME | End: 2023-05-11
Payer: MEDICARE

## 2023-05-11 DIAGNOSIS — M81.0 POSTMENOPAUSAL BONE LOSS: ICD-10-CM

## 2023-05-11 PROCEDURE — 77080 DXA BONE DENSITY AXIAL: CPT

## 2023-05-15 ENCOUNTER — TELEPHONE (OUTPATIENT)
Dept: INTERNAL MEDICINE | Facility: CLINIC | Age: 78
End: 2023-05-15
Payer: MEDICARE

## 2023-05-15 DIAGNOSIS — R26.9 NEUROLOGIC GAIT DYSFUNCTION: ICD-10-CM

## 2023-05-15 RX ORDER — GABAPENTIN 300 MG/1
300 CAPSULE ORAL NIGHTLY
Qty: 90 CAPSULE | Refills: 1 | Status: SHIPPED | OUTPATIENT
Start: 2023-05-15

## 2023-05-15 NOTE — TELEPHONE ENCOUNTER
Let patient know I sent a prescription over for 300 mg capsule, take 1 every evening.    You said she was taking 3 capsules daily, so not sure if it was one capsule 3 times a day, or all 3 of them at bedtime.    Generally recommend patient take at least 300 to 400 mg in the evening at 1 time before adding doses during the day.  So even if she was taking 100 3 times daily, tell her to try this first and let us know.

## 2023-05-15 NOTE — TELEPHONE ENCOUNTER
Caller: Kelsey Durand    Relationship: Self    Best call back number: [phone]    What is the best time to reach you: ANY    Who are you requesting to speak with (clinical staff, provider,  specific staff member): VALE MALLOY    What was the call regarding: PATIENT WOULD LIKE TO SPEAK TO VALE REGARDING A MEDICATION.     Do you require a callback: YES

## 2023-05-15 NOTE — TELEPHONE ENCOUNTER
Spoke with patient and she stated she has increased her gabapentin to 3 caps daily and she is almost out of the prescription and when she call the pharmacy she was told it was too early to refill this medication, patient states she has enough for this week but she will need a refill for next week. Please advise

## 2023-05-24 ENCOUNTER — PATIENT OUTREACH (OUTPATIENT)
Dept: CASE MANAGEMENT | Facility: OTHER | Age: 78
End: 2023-05-24
Payer: MEDICARE

## 2023-05-24 DIAGNOSIS — I10 PRIMARY HYPERTENSION: ICD-10-CM

## 2023-05-24 DIAGNOSIS — E11.9 TYPE 2 DIABETES MELLITUS WITHOUT COMPLICATION, WITHOUT LONG-TERM CURRENT USE OF INSULIN: Primary | ICD-10-CM

## 2023-05-24 NOTE — OUTREACH NOTE
AMBULATORY CASE MANAGEMENT NOTE    Name and Relationship of Patient/Support Person: Kelsey Durand - Self    CCM Interim Update        CM reached out to the patient . PT stated that she was feeling ok at the time of the call. Stated that she was still having hip pain . PT denied setting throughout the day and elevating LE to see if that would reduce pain. Currently taking Gabapentin for pain 300 mg before bed. Patient stated that she would increase the med if needed CM recommended that she not increase without confirming with PCP first and the Patient verbalized understanding.Patient stated that she normally used a bolanos for ambulation stated she has a cat that sometimes gets under her feet . CM cautioned her about the tripping hazard Patient verbalized understanding. CM discussed , in great detail , other possible hazards within the home such as stairs, getting in and out of tub and possible trip hazards within the home. Patient stated understanding.Chart reviewed , meds reviewed, patient stated no further needs at this time.All future appts reviewed . All questions were answered and contact information provided . Future outreach scheduled .          Education Documentation  No documentation found.        JEFFERY YOUNGBLOOD  Ambulatory Case Management    5/24/2023, 11:14 EDT

## 2023-05-30 ENCOUNTER — PATIENT OUTREACH (OUTPATIENT)
Dept: CASE MANAGEMENT | Facility: OTHER | Age: 78
End: 2023-05-30

## 2023-05-30 DIAGNOSIS — I10 PRIMARY HYPERTENSION: ICD-10-CM

## 2023-05-30 DIAGNOSIS — E11.9 TYPE 2 DIABETES MELLITUS WITHOUT COMPLICATION, WITHOUT LONG-TERM CURRENT USE OF INSULIN: Primary | ICD-10-CM

## 2023-05-30 DIAGNOSIS — E78.2 MIXED HYPERLIPIDEMIA: ICD-10-CM

## 2023-05-30 NOTE — OUTREACH NOTE
AMBULATORY CASE MANAGEMENT NOTE    Name and Relationship of Patient/Support Person:  -     [facility] End of Month Documentation    This Chronic Medical Management Care Plan for Kelsey Durand, 78 y.o. female, has been monitored and managed; reviewed and a new plan of care implemented for the month of May.  A cumulative time of 27  minutes was spent on this patient record this month, including chart review; phone call with patient, Home saftey , chart review, phone call with patient.    Regarding the patient's problems: has Acquired atrophy of thyroid; Mixed hyperlipidemia; Vitamin D deficiency; Type 2 diabetes mellitus without complication, without long-term current use of insulin; Primary osteoarthritis involving multiple joints; Hospital discharge follow-up; Recurrent major depression in partial remission; Primary hypertension; Gastroesophageal reflux disease without esophagitis; Gallstones; Right upper quadrant abdominal pain; Nausea and vomiting; Neurologic gait dysfunction; Medicare annual wellness visit, subsequent; and Sensory neuropathy on their problem list., the following items were addressed: medical records; medications; referrals to community service providers and any changes can be found within the plan section of the note.  A detailed listing of time spent for chronic care management is tracked within each outreach encounter.  Current medications include:  has a current medication list which includes the following prescription(s): acetaminophen, escitalopram, fluticasone, gabapentin, levothyroxine, metoprolol succinate xl, multivitamin with minerals, ondansetron, psyllium, and simvastatin. and the patient is reported to be patient is compliant with medication protocol,  Medications are reported to be effective.  All notes on chart for PCP to review.    The patient was monitored remotely for pain; blood pressure; blood glucose.    The patient's physical needs include:  needs met.     The patient's  mental support needs include:  needs met; continued support    The patient's cognitive support needs include:  not applicable    The patient's psychosocial support needs include:  needs met    The patient's functional needs include: needs met    The patient's environmental needs include:  not applicable, NA    Care Plan overall comments:  Care plan in place    Refer to previous outreach notes for more information on the areas listed above.    Monthly Billing Diagnoses  (E11.9) Type 2 diabetes mellitus without complication, without long-term current use of insulin    (I10) Primary hypertension    (E78.2) Mixed hyperlipidemia    Medications   · Medications have been reconciled    Care Plan progress this month:      Recently Modified Care Plans Updates made since 4/29/2023 12:00 AM    No recently modified care plans.          · Current Specialty Plan of Care Status not yet initiated    Instructions   · Patient was provided an electronic copy of care plan  · CCM services were explained and offered and patient has accepted these services.  · Patient has given their written consent to receive CCM services and understands that this includes the authorization of electronic communication of medical information with the other treating providers.  · Patient understands that they may stop CCM services at any time and these changes will be effective at the end of the calendar month and will effectively revocate the agreement of CCM services.  · Patient understands that only one practitioner can furnish and be paid for CCM services during one calendar month.  Patient also understands that there may be co-payment or deductible fees in association with CCM services.  · Patient will continue with at least monthly follow-up calls with the Ambulatory .    JEFFERY YOUNGBLOOD  Ambulatory Case Management    5/30/2023, 14:41 EDT    Education Documentation  No documentation found.        JEFFERY YOUNGBLOOD  Ambulatory Case Management    5/30/2023, 14:40  EDT

## 2023-07-31 ENCOUNTER — PATIENT OUTREACH (OUTPATIENT)
Dept: CASE MANAGEMENT | Facility: OTHER | Age: 78
End: 2023-07-31
Payer: MEDICARE

## 2023-07-31 DIAGNOSIS — I10 PRIMARY HYPERTENSION: ICD-10-CM

## 2023-07-31 DIAGNOSIS — E11.9 TYPE 2 DIABETES MELLITUS WITHOUT COMPLICATION, WITHOUT LONG-TERM CURRENT USE OF INSULIN: Primary | ICD-10-CM

## 2023-08-02 ENCOUNTER — PATIENT OUTREACH (OUTPATIENT)
Dept: CASE MANAGEMENT | Facility: OTHER | Age: 78
End: 2023-08-02
Payer: MEDICARE

## 2023-08-02 DIAGNOSIS — E11.9 TYPE 2 DIABETES MELLITUS WITHOUT COMPLICATION, WITHOUT LONG-TERM CURRENT USE OF INSULIN: ICD-10-CM

## 2023-08-02 DIAGNOSIS — I10 PRIMARY HYPERTENSION: Primary | ICD-10-CM

## 2023-08-10 ENCOUNTER — CLINICAL SUPPORT (OUTPATIENT)
Dept: INTERNAL MEDICINE | Facility: CLINIC | Age: 78
End: 2023-08-10
Payer: MEDICARE

## 2023-08-10 DIAGNOSIS — E03.4 ACQUIRED ATROPHY OF THYROID: ICD-10-CM

## 2023-08-10 DIAGNOSIS — E11.9 TYPE 2 DIABETES MELLITUS WITHOUT COMPLICATION, WITHOUT LONG-TERM CURRENT USE OF INSULIN: ICD-10-CM

## 2023-08-10 DIAGNOSIS — I10 PRIMARY HYPERTENSION: ICD-10-CM

## 2023-08-10 DIAGNOSIS — E78.2 MIXED HYPERLIPIDEMIA: ICD-10-CM

## 2023-08-10 LAB
ALBUMIN SERPL-MCNC: 4.1 G/DL (ref 3.5–5.2)
ALBUMIN/GLOB SERPL: 1.4 G/DL
ALP SERPL-CCNC: 52 U/L (ref 39–117)
ALT SERPL W P-5'-P-CCNC: 14 U/L (ref 1–33)
ANION GAP SERPL CALCULATED.3IONS-SCNC: 9.6 MMOL/L (ref 5–15)
AST SERPL-CCNC: 22 U/L (ref 1–32)
BILIRUB SERPL-MCNC: 0.8 MG/DL (ref 0–1.2)
BUN SERPL-MCNC: 13 MG/DL (ref 8–23)
BUN/CREAT SERPL: 18.1 (ref 7–25)
CALCIUM SPEC-SCNC: 9.2 MG/DL (ref 8.6–10.5)
CHLORIDE SERPL-SCNC: 103 MMOL/L (ref 98–107)
CHOLEST SERPL-MCNC: 150 MG/DL (ref 0–200)
CO2 SERPL-SCNC: 27.4 MMOL/L (ref 22–29)
CREAT SERPL-MCNC: 0.72 MG/DL (ref 0.57–1)
EGFRCR SERPLBLD CKD-EPI 2021: 84.6 ML/MIN/1.73
GLOBULIN UR ELPH-MCNC: 3 GM/DL
GLUCOSE SERPL-MCNC: 96 MG/DL (ref 65–99)
HBA1C MFR BLD: 6.3 % (ref 4.8–5.6)
HDLC SERPL-MCNC: 57 MG/DL (ref 40–60)
LDLC SERPL CALC-MCNC: 76 MG/DL (ref 0–100)
LDLC/HDLC SERPL: 1.31 {RATIO}
POTASSIUM SERPL-SCNC: 4.7 MMOL/L (ref 3.5–5.2)
PROT SERPL-MCNC: 7.1 G/DL (ref 6–8.5)
SODIUM SERPL-SCNC: 140 MMOL/L (ref 136–145)
TRIGL SERPL-MCNC: 93 MG/DL (ref 0–150)
TSH SERPL DL<=0.05 MIU/L-ACNC: 1.38 UIU/ML (ref 0.27–4.2)
VLDLC SERPL-MCNC: 17 MG/DL (ref 5–40)

## 2023-08-10 PROCEDURE — 36415 COLL VENOUS BLD VENIPUNCTURE: CPT | Performed by: INTERNAL MEDICINE

## 2023-08-10 PROCEDURE — 83036 HEMOGLOBIN GLYCOSYLATED A1C: CPT | Performed by: INTERNAL MEDICINE

## 2023-08-10 PROCEDURE — 80053 COMPREHEN METABOLIC PANEL: CPT | Performed by: INTERNAL MEDICINE

## 2023-08-10 PROCEDURE — 80061 LIPID PANEL: CPT | Performed by: INTERNAL MEDICINE

## 2023-08-10 PROCEDURE — 84443 ASSAY THYROID STIM HORMONE: CPT | Performed by: INTERNAL MEDICINE

## 2023-08-17 ENCOUNTER — TELEPHONE (OUTPATIENT)
Dept: INTERNAL MEDICINE | Facility: CLINIC | Age: 78
End: 2023-08-17
Payer: MEDICARE

## 2023-08-17 PROCEDURE — 81003 URINALYSIS AUTO W/O SCOPE: CPT | Performed by: INTERNAL MEDICINE

## 2023-08-17 NOTE — TELEPHONE ENCOUNTER
Caller: Kelsey Durand    Relationship to patient: Self    Best call back number: [phone]    Patient is needing: PATIENT CALLED IN AND SAID SHE THINKS SHE HAS A BLADDER INFECTION AND WOULD LIKE TO BRING IN A URINE SAMPLE. PATIENT IS REQUESTING A CALL BACK PLEASE AND REQUESTED VALE IF AVAILABLE.

## 2023-08-17 NOTE — TELEPHONE ENCOUNTER
Caller: Kelsey Durand    Relationship: Self    Best call back number:[phone]     What is the best time to reach you: ANY    Who are you requesting to speak with (clinical staff, provider,  specific staff member): CLINICAL    What was the call regarding: PATIENT WOULD LIKE A CALL BACK ABOUT SOME WEAKNESS SHE HAS HAD THE PAST WEEK.

## 2023-08-17 NOTE — TELEPHONE ENCOUNTER
Pt brought in a urine sample, we are going to see results before doing anything with weakness message, Leigh Ann voiced understanding.

## 2023-08-24 ENCOUNTER — OFFICE VISIT (OUTPATIENT)
Dept: INTERNAL MEDICINE | Facility: CLINIC | Age: 78
End: 2023-08-24
Payer: MEDICARE

## 2023-08-24 VITALS
DIASTOLIC BLOOD PRESSURE: 78 MMHG | TEMPERATURE: 97.8 F | WEIGHT: 181 LBS | HEART RATE: 86 BPM | RESPIRATION RATE: 18 BRPM | OXYGEN SATURATION: 96 % | HEIGHT: 62 IN | BODY MASS INDEX: 33.31 KG/M2 | SYSTOLIC BLOOD PRESSURE: 120 MMHG

## 2023-08-24 DIAGNOSIS — R29.6 RECURRENT FALLS WHILE WALKING: ICD-10-CM

## 2023-08-24 DIAGNOSIS — G62.9 SENSORY NEUROPATHY: ICD-10-CM

## 2023-08-24 DIAGNOSIS — E03.4 ACQUIRED ATROPHY OF THYROID: ICD-10-CM

## 2023-08-24 DIAGNOSIS — R73.01 IFG (IMPAIRED FASTING GLUCOSE): ICD-10-CM

## 2023-08-24 DIAGNOSIS — R26.9 NEUROLOGIC GAIT DYSFUNCTION: ICD-10-CM

## 2023-08-24 DIAGNOSIS — I10 PRIMARY HYPERTENSION: Primary | ICD-10-CM

## 2023-08-24 DIAGNOSIS — E78.2 MIXED HYPERLIPIDEMIA: ICD-10-CM

## 2023-08-24 PROBLEM — E11.9 TYPE 2 DIABETES MELLITUS WITHOUT COMPLICATION, WITHOUT LONG-TERM CURRENT USE OF INSULIN: Status: RESOLVED | Noted: 2021-10-16 | Resolved: 2023-08-24

## 2023-08-24 PROCEDURE — 1160F RVW MEDS BY RX/DR IN RCRD: CPT | Performed by: INTERNAL MEDICINE

## 2023-08-24 PROCEDURE — 3074F SYST BP LT 130 MM HG: CPT | Performed by: INTERNAL MEDICINE

## 2023-08-24 PROCEDURE — 99214 OFFICE O/P EST MOD 30 MIN: CPT | Performed by: INTERNAL MEDICINE

## 2023-08-24 PROCEDURE — 1159F MED LIST DOCD IN RCRD: CPT | Performed by: INTERNAL MEDICINE

## 2023-08-24 PROCEDURE — 3078F DIAST BP <80 MM HG: CPT | Performed by: INTERNAL MEDICINE

## 2023-08-24 RX ORDER — ESCITALOPRAM OXALATE 5 MG/1
5 TABLET ORAL DAILY
Qty: 90 TABLET | Refills: 1 | Status: SHIPPED | OUTPATIENT
Start: 2023-08-24

## 2023-08-24 RX ORDER — LEVOTHYROXINE SODIUM 0.1 MG/1
100 TABLET ORAL EVERY MORNING
Qty: 90 TABLET | Refills: 1 | Status: SHIPPED | OUTPATIENT
Start: 2023-08-24

## 2023-08-24 RX ORDER — METOPROLOL SUCCINATE 25 MG/1
25 TABLET, EXTENDED RELEASE ORAL DAILY
Qty: 90 TABLET | Refills: 1 | Status: SHIPPED | OUTPATIENT
Start: 2023-08-24

## 2023-08-24 NOTE — PROGRESS NOTES
"Chief Complaint  Hypertension (79 y/o female is here today to follow up on labs. Patient needs refills on metoprolol, escitalopram and levothyroxine. Patient states Rt leg stay swollen a lot, and her Lt knee feels swollen. Patient states she is having trouble walking, patient states at one time she lost balance. No other issues at this  time.)    Subjective          Kelsey Durand presents to [facility]     History of Present Illness:  Patient is a pleasant 78-year-old female with underlying hyperlipidemia, hypothyroidism, IFG, among others, who is coming in 8/23 for her routine 4-month follow-up.  We will review her medication list, go over her labs in detail, and address other concerns at that time.    Review of Systems   Constitutional:  Negative for appetite change, fatigue and fever.   HENT:  Negative for congestion and ear pain.    Eyes:  Negative for blurred vision.   Respiratory:  Negative for cough, chest tightness, shortness of breath and wheezing.    Cardiovascular:  Negative for chest pain, palpitations and leg swelling.   Gastrointestinal:  Negative for abdominal pain.   Genitourinary:  Negative for difficulty urinating, dysuria and hematuria.   Musculoskeletal:  Negative for arthralgias and gait problem.   Skin:  Negative for skin lesions.   Neurological:  Negative for syncope, memory problem and confusion.   Psychiatric/Behavioral:  Negative for self-injury and depressed mood.      Objective   Vital Signs:   /78 (BP Location: Right arm, Patient Position: Sitting, Cuff Size: Adult)   Pulse 86   Temp 97.8 øF (36.6 øC) (Temporal)   Resp 18   Ht 157.5 cm (62.01\")   Wt 82.1 kg (181 lb)   SpO2 96%   BMI 33.10 kg/mý           Physical Exam  Vitals and nursing note reviewed.   Constitutional:       General: She is not in acute distress.     Appearance: Normal appearance. She is not toxic-appearing.   HENT:      Head: Atraumatic.      Right Ear: External " ear normal.      Left Ear: External ear normal.      Nose: Nose normal.      Mouth/Throat:      Mouth: Mucous membranes are moist.   Eyes:      General:         Right eye: No discharge.         Left eye: No discharge.      Extraocular Movements: Extraocular movements intact.      Pupils: Pupils are equal, round, and reactive to light.   Cardiovascular:      Rate and Rhythm: Normal rate and regular rhythm.      Pulses: Normal pulses.      Heart sounds: Normal heart sounds. No murmur heard.    No gallop.   Pulmonary:      Effort: Pulmonary effort is normal. No respiratory distress.      Breath sounds: No wheezing, rhonchi or rales.   Abdominal:      General: There is no distension.      Palpations: Abdomen is soft. There is no mass.      Tenderness: There is no abdominal tenderness. There is no guarding.   Musculoskeletal:         General: No swelling or tenderness.      Cervical back: No tenderness.      Right lower leg: No edema.      Left lower leg: No edema.   Skin:     General: Skin is warm and dry.      Findings: No rash.   Neurological:      General: No focal deficit present.      Mental Status: She is alert and oriented to person, place, and time. Mental status is at baseline.      Motor: No weakness.      Gait: Gait normal.   Psychiatric:         Mood and Affect: Mood normal.         Thought Content: Thought content normal.        Result Review :   The following data was reviewed by: Chris Elder MD on 10/18/2021:                  Assessment and Plan    Diagnoses and all orders for this visit:    1. Primary hypertension (Primary)  Assessment & Plan:  Blood pressure well controlled as of her 8/23 office visit.  Pulses around 80, so certainly stable to continue with low-dose metoprolol.    Orders:  -     Basic Metabolic Panel; Future    2. Mixed hyperlipidemia  Assessment & Plan:  LDL is 76 as of 8/23 office visit.  Patient stable to continue just very low-dose simvastatin, she only takes 10 mg every other  day.      3. IFG (impaired fasting glucose)  Assessment & Plan:  Patient's A1c is stable at 6.1 as of her 4/23 office visit. Still no indication for medication obviously, will continue to keep an eye on this though.---> A1c is up just slightly to 6.3 as of 8/23 office visit.  This is still very reasonable, no medications indicated, will keep an eye on it though.    Orders:  -     Hemoglobin A1c; Future    4. Acquired atrophy of thyroid  Assessment & Plan:  TSH is 1.4 as of 8/23, so patient stable to continue 100 mcg daily.      5. Sensory neuropathy  Assessment & Plan:  I reviewed her note from neurology at her 8/23 office visit.  Looks like she unfortunately has some diabetic related neuropathy despite A1c not being in the diabetic range.  She is having some increased issues with weakness and a recent fall as of her 8/23 OV, so we will get her back in with physical therapy to work on generalized strengthening.  Additionally we will get a few extra labs on return to office as per neurology's recommendation.    Orders:  -     Vitamin B12 anemia; Future  -     Folate anemia; Future  -     MICHELLE + PE; Future  -     Ambulatory Referral to Physical Therapy Evaluate and treat    6. Neurologic gait dysfunction  -     Ambulatory Referral to Physical Therapy Evaluate and treat    7. Recurrent falls while walking  -     Ambulatory Referral to Physical Therapy Evaluate and treat    Other orders  -     escitalopram (LEXAPRO) 5 MG tablet; Take 1 tablet by mouth Daily.  Dispense: 90 tablet; Refill: 1  -     levothyroxine (SYNTHROID, LEVOTHROID) 100 MCG tablet; Take 1 tablet by mouth Every Morning.  Dispense: 90 tablet; Refill: 1  -     metoprolol succinate XL (TOPROL-XL) 25 MG 24 hr tablet; Take 1 tablet by mouth Daily.  Dispense: 90 tablet; Refill: 1       --  --  OLDER NOTES:  VISIT 4/21:  ANNUAL MEDICARE WELLNESS PHYSICAL 4/19 = reviewed all forms in office with pt; no new discoveries; active at mall=class and walks/ bike at  "home.  H.M. ISSUES:  BMI 30 and d/w watch carbs and walk.  --  \"HYPERTENSION\" controlled=better at home='s (off meds good while)---> d/w check occ and let us know=fine at home as of 4/21.    LIPIDS at goal with LDL 68...101 so f/u before change...93 fine---> 80 stable 4/21.  --  HYPOTHYROIDISM stable 10/16 with TSH 0.2 still=on same dose many years---> fine 4/21.  IFG mild/stable = FBS 92 with A1C 6.0---> 6.3 in 4/20---> 6.0 in 4/21.  --  ANXIETY D/O 1/19 and has benzo to use PRN; d/w call if feels she needs to use it too often and will add SSRI...cheerful 10/19...will use low dose lexapro as of 4/20 OV---> seems better 10/20.  B12 WZA=101 in 4/20.  --  SYNCOPE is ? seizure per Dr Valadez/Flash in [facility] over the weekend, so will get EEG (tongue bite/incontinent)...EEG was neg 9/15.  --  BMD 5/17 = spine 0.9, hip 0.6 is great---> BMD 7/20 = spine 1.0, hip -0.3 = great!!  VIT D DEF remains stable on 2000 qd...28 and d/w take routinely please...44---> same 10/20.  --  DJD no new c/o.  R KNEE PAIN with no trauma, comes and goes, exam is w/o laxity, so agree with repeat mobic and use brace and then PTA if no better.  --  A.R. no flare.  --  --  MMG 5/4/2023 per Dr Lawler/Fabiola Pacheco.  COLON 11/19...polyp/FH+ = daughter = 5 years per Dr. Streeter  Adacel 3/14, Prevnar '16; Pneumovax #1 10/17; Hep A x2 10/18; d/w Shingrix 10/18;   ( Samuel 7/13, 2 girls here=Leigh Ann (no kids) and Xiao = youngest is boy in 9th grade 12/22 and girl with Asperger's is at [facility]).    Follow Up   Return in about 4 months (around 12/24/2023).  Patient was given instructions and counseling regarding her condition or for health maintenance advice. Please see specific information pulled into the AVS if appropriate.       "

## 2023-08-24 NOTE — ASSESSMENT & PLAN NOTE
Blood pressure well controlled as of her 8/23 office visit.  Pulses around 80, so certainly stable to continue with low-dose metoprolol.

## 2023-08-24 NOTE — ASSESSMENT & PLAN NOTE
LDL is 76 as of 8/23 office visit.  Patient stable to continue just very low-dose simvastatin, she only takes 10 mg every other day.

## 2023-08-24 NOTE — ASSESSMENT & PLAN NOTE
Patient's A1c is stable at 6.1 as of her 4/23 office visit. Still no indication for medication obviously, will continue to keep an eye on this though.---> A1c is up just slightly to 6.3 as of 8/23 office visit.  This is still very reasonable, no medications indicated, will keep an eye on it though.

## 2023-08-24 NOTE — ASSESSMENT & PLAN NOTE
I reviewed her note from neurology at her 8/23 office visit.  Looks like she unfortunately has some diabetic related neuropathy despite A1c not being in the diabetic range.  She is having some increased issues with weakness and a recent fall as of her 8/23 OV, so we will get her back in with physical therapy to work on generalized strengthening.  Additionally we will get a few extra labs on return to office as per neurology's recommendation.

## 2023-08-31 ENCOUNTER — PATIENT OUTREACH (OUTPATIENT)
Dept: CASE MANAGEMENT | Facility: OTHER | Age: 78
End: 2023-08-31
Payer: MEDICARE

## 2023-08-31 DIAGNOSIS — I10 PRIMARY HYPERTENSION: Primary | ICD-10-CM

## 2023-08-31 DIAGNOSIS — E11.9 TYPE 2 DIABETES MELLITUS WITHOUT COMPLICATION, WITHOUT LONG-TERM CURRENT USE OF INSULIN: ICD-10-CM

## 2023-08-31 DIAGNOSIS — E78.2 MIXED HYPERLIPIDEMIA: ICD-10-CM

## 2023-08-31 DIAGNOSIS — R19.7 DIARRHEA, UNSPECIFIED TYPE: ICD-10-CM

## 2023-08-31 NOTE — OUTREACH NOTE
AMBULATORY CASE MANAGEMENT NOTE    Name and Relationship of Patient/Support Person:  -     [facility] End of Month Documentation    This Chronic Medical Management Care Plan for Kelsey Durand, 78 y.o. female, has been monitored and managed; reviewed and a new plan of care implemented for the month of August.  A cumulative time of 25  minutes was spent on this patient record this month, including chart review; phone call with patient; electronic communication with primary care provider, Home saftey , chart review, phone call with patient.    Regarding the patient's problems: has Acquired atrophy of thyroid; Mixed hyperlipidemia; Vitamin D deficiency; Primary osteoarthritis involving multiple joints; Hospital discharge follow-up; Recurrent major depression in partial remission; Primary hypertension; Gastroesophageal reflux disease without esophagitis; Gallstones; Right upper quadrant abdominal pain; Nausea and vomiting; Neurologic gait dysfunction; Medicare annual wellness visit, subsequent; Sensory neuropathy; and IFG (impaired fasting glucose) on their problem list., the following items were addressed: medical records; medications and any changes can be found within the plan section of the note.  A detailed listing of time spent for chronic care management is tracked within each outreach encounter.  Current medications include:  has a current medication list which includes the following prescription(s): acetaminophen, escitalopram, fluticasone, levothyroxine, metoprolol succinate xl, multivitamin with minerals, psyllium, and simvastatin. and the patient is reported to be patient is compliant with medication protocol,  Medications are reported to be effective.  Regarding these diagnoses, referrals were made to the following provider(s):  .  All notes on chart for PCP to review.    The patient was monitored remotely for blood pressure; blood glucose, nausea,possable UTI.    The patient's physical needs include:  needs  met.     The patient's mental support needs include:  needs met; continued support    The patient's cognitive support needs include:  not applicable    The patient's psychosocial support needs include:  needs met    The patient's functional needs include: needs met    The patient's environmental needs include:  not applicable, NA    Care Plan overall comments:  Care plan in place    Refer to previous outreach notes for more information on the areas listed above.    Monthly Billing Diagnoses  (I10) Primary hypertension    (E11.9) Type 2 diabetes mellitus without complication, without long-term current use of insulin    (E78.2) Mixed hyperlipidemia    (R19.7) Diarrhea, unspecified type    Medications   Medications have been reconciled    Care Plan progress this month:      Recently Modified Care Plans Updates made since 7/31/2023 12:00 AM      No recently modified care plans.            Current Specialty Plan of Care Status not yet initiated    Instructions   Patient was provided an electronic copy of care plan  CCM services were explained and offered and patient has accepted these services.  Patient has given their written consent to receive CCM services and understands that this includes the authorization of electronic communication of medical information with the other treating providers.  Patient understands that they may stop CCM services at any time and these changes will be effective at the end of the calendar month and will effectively revocate the agreement of CCM services.  Patient understands that only one practitioner can furnish and be paid for CCM services during one calendar month.  Patient also understands that there may be co-payment or deductible fees in association with CCM services.  Patient will continue with at least monthly follow-up calls with the Ambulatory .    JEFFERY YOUNGBLOOD  Ambulatory Case Management    8/31/2023, 09:30 EDT    Education Documentation  No documentation found.        JEFFERY  B  Ambulatory Case Management    8/31/2023, 09:30 EDT

## 2023-09-07 ENCOUNTER — APPOINTMENT (OUTPATIENT)
Dept: CT IMAGING | Facility: HOSPITAL | Age: 78
End: 2023-09-07
Payer: MEDICARE

## 2023-09-07 ENCOUNTER — HOSPITAL ENCOUNTER (EMERGENCY)
Facility: HOSPITAL | Age: 78
Discharge: HOME | End: 2023-09-07
Attending: EMERGENCY MEDICINE
Payer: MEDICARE

## 2023-09-07 VITALS
SYSTOLIC BLOOD PRESSURE: 124 MMHG | WEIGHT: 181.3 LBS | HEART RATE: 69 BPM | DIASTOLIC BLOOD PRESSURE: 71 MMHG | BODY MASS INDEX: 33.36 KG/M2 | RESPIRATION RATE: 17 BRPM | HEIGHT: 62 IN | OXYGEN SATURATION: 94 % | TEMPERATURE: 98.6 F

## 2023-09-07 DIAGNOSIS — K52.9 ACUTE GASTROENTERITIS: Primary | ICD-10-CM

## 2023-09-07 LAB
ALBUMIN SERPL-MCNC: 4 G/DL (ref 3.5–5.2)
ALBUMIN/GLOB SERPL: 1.3 G/DL
ALP SERPL-CCNC: 51 U/L (ref 39–117)
ALT SERPL W P-5'-P-CCNC: 14 U/L (ref 1–33)
ANION GAP SERPL CALCULATED.3IONS-SCNC: 7.3 MMOL/L (ref 5–15)
AST SERPL-CCNC: 20 U/L (ref 1–32)
BACTERIA UR QL AUTO: ABNORMAL /HPF
BASOPHILS # BLD AUTO: 0.02 10*3/MM3 (ref 0–0.2)
BASOPHILS NFR BLD AUTO: 0.2 % (ref 0–1.5)
BILIRUB SERPL-MCNC: 0.9 MG/DL (ref 0–1.2)
BILIRUB UR QL STRIP: NEGATIVE
BUN SERPL-MCNC: 14 MG/DL (ref 8–23)
BUN/CREAT SERPL: 17.9 (ref 7–25)
CALCIUM SPEC-SCNC: 9.5 MG/DL (ref 8.6–10.5)
CHLORIDE SERPL-SCNC: 101 MMOL/L (ref 98–107)
CLARITY UR: CLEAR
CO2 SERPL-SCNC: 30.7 MMOL/L (ref 22–29)
COLOR UR: YELLOW
CREAT SERPL-MCNC: 0.78 MG/DL (ref 0.57–1)
D-LACTATE SERPL-SCNC: 1.4 MMOL/L (ref 0.5–2)
D-LACTATE SERPL-SCNC: 2.6 MMOL/L (ref 0.5–2)
DEPRECATED RDW RBC AUTO: 49.1 FL (ref 37–54)
EGFRCR SERPLBLD CKD-EPI 2021: 76.9 ML/MIN/1.73
EOSINOPHIL # BLD AUTO: 0.06 10*3/MM3 (ref 0–0.4)
EOSINOPHIL NFR BLD AUTO: 0.6 % (ref 0.3–6.2)
ERYTHROCYTE [DISTWIDTH] IN BLOOD BY AUTOMATED COUNT: 14.8 % (ref 12.3–15.4)
GLOBULIN UR ELPH-MCNC: 3 GM/DL
GLUCOSE SERPL-MCNC: 153 MG/DL (ref 65–99)
GLUCOSE UR STRIP-MCNC: NEGATIVE MG/DL
HCT VFR BLD AUTO: 41.1 % (ref 34–46.6)
HGB BLD-MCNC: 13.7 G/DL (ref 12–15.9)
HGB UR QL STRIP.AUTO: NEGATIVE
HOLD SPECIMEN: NORMAL
HOLD SPECIMEN: NORMAL
HYALINE CASTS UR QL AUTO: ABNORMAL /LPF
IMM GRANULOCYTES # BLD AUTO: 0.03 10*3/MM3 (ref 0–0.05)
IMM GRANULOCYTES NFR BLD AUTO: 0.3 % (ref 0–0.5)
KETONES UR QL STRIP: ABNORMAL
LEUKOCYTE ESTERASE UR QL STRIP.AUTO: ABNORMAL
LIPASE SERPL-CCNC: 36 U/L (ref 13–60)
LYMPHOCYTES # BLD AUTO: 0.39 10*3/MM3 (ref 0.7–3.1)
LYMPHOCYTES NFR BLD AUTO: 4.2 % (ref 19.6–45.3)
MCH RBC QN AUTO: 30.2 PG (ref 26.6–33)
MCHC RBC AUTO-ENTMCNC: 33.3 G/DL (ref 31.5–35.7)
MCV RBC AUTO: 90.7 FL (ref 79–97)
MONOCYTES # BLD AUTO: 0.51 10*3/MM3 (ref 0.1–0.9)
MONOCYTES NFR BLD AUTO: 5.5 % (ref 5–12)
NEUTROPHILS NFR BLD AUTO: 8.3 10*3/MM3 (ref 1.7–7)
NEUTROPHILS NFR BLD AUTO: 89.2 % (ref 42.7–76)
NITRITE UR QL STRIP: NEGATIVE
NRBC BLD AUTO-RTO: 0 /100 WBC (ref 0–0.2)
PH UR STRIP.AUTO: 7 [PH] (ref 5–8)
PLATELET # BLD AUTO: 228 10*3/MM3 (ref 140–450)
PMV BLD AUTO: 9.7 FL (ref 6–12)
POTASSIUM SERPL-SCNC: 4.1 MMOL/L (ref 3.5–5.2)
PROT SERPL-MCNC: 7 G/DL (ref 6–8.5)
PROT UR QL STRIP: NEGATIVE
RBC # BLD AUTO: 4.53 10*6/MM3 (ref 3.77–5.28)
RBC # UR STRIP: ABNORMAL /HPF
REF LAB TEST METHOD: ABNORMAL
SARS-COV-2 RNA RESP QL NAA+PROBE: NOT DETECTED
SODIUM SERPL-SCNC: 139 MMOL/L (ref 136–145)
SP GR UR STRIP: 1.02 (ref 1–1.03)
SQUAMOUS #/AREA URNS HPF: ABNORMAL /HPF
UROBILINOGEN UR QL STRIP: ABNORMAL
WBC # UR STRIP: ABNORMAL /HPF
WBC NRBC COR # BLD: 9.31 10*3/MM3 (ref 3.4–10.8)
WHOLE BLOOD HOLD COAG: NORMAL
WHOLE BLOOD HOLD SPECIMEN: NORMAL

## 2023-09-07 PROCEDURE — 81001 URINALYSIS AUTO W/SCOPE: CPT | Performed by: EMERGENCY MEDICINE

## 2023-09-07 PROCEDURE — 87086 URINE CULTURE/COLONY COUNT: CPT | Performed by: EMERGENCY MEDICINE

## 2023-09-07 PROCEDURE — 83605 ASSAY OF LACTIC ACID: CPT

## 2023-09-07 PROCEDURE — 96374 THER/PROPH/DIAG INJ IV PUSH: CPT

## 2023-09-07 PROCEDURE — 85025 COMPLETE CBC W/AUTO DIFF WBC: CPT

## 2023-09-07 PROCEDURE — 25010000002 ONDANSETRON PER 1 MG: Performed by: EMERGENCY MEDICINE

## 2023-09-07 PROCEDURE — 87635 SARS-COV-2 COVID-19 AMP PRB: CPT | Performed by: EMERGENCY MEDICINE

## 2023-09-07 PROCEDURE — 99285 EMERGENCY DEPT VISIT HI MDM: CPT

## 2023-09-07 PROCEDURE — 74177 CT ABD & PELVIS W/CONTRAST: CPT

## 2023-09-07 PROCEDURE — 83690 ASSAY OF LIPASE: CPT

## 2023-09-07 PROCEDURE — 25510000001 IOPAMIDOL PER 1 ML: Performed by: EMERGENCY MEDICINE

## 2023-09-07 PROCEDURE — 83605 ASSAY OF LACTIC ACID: CPT | Performed by: EMERGENCY MEDICINE

## 2023-09-07 PROCEDURE — 80053 COMPREHEN METABOLIC PANEL: CPT

## 2023-09-07 RX ORDER — ONDANSETRON 2 MG/ML
4 INJECTION INTRAMUSCULAR; INTRAVENOUS ONCE
Status: COMPLETED | OUTPATIENT
Start: 2023-09-07 | End: 2023-09-07

## 2023-09-07 RX ORDER — SODIUM CHLORIDE 0.9 % (FLUSH) 0.9 %
10 SYRINGE (ML) INJECTION AS NEEDED
Status: DISCONTINUED | OUTPATIENT
Start: 2023-09-07 | End: 2023-09-07 | Stop reason: HOSPADM

## 2023-09-07 RX ORDER — ONDANSETRON 4 MG/1
4 TABLET, ORALLY DISINTEGRATING ORAL 4 TIMES DAILY PRN
Qty: 9 TABLET | Refills: 0 | Status: SHIPPED | OUTPATIENT
Start: 2023-09-07

## 2023-09-07 RX ORDER — DICYCLOMINE HYDROCHLORIDE 10 MG/1
10 CAPSULE ORAL 3 TIMES DAILY PRN
Qty: 12 CAPSULE | Refills: 0 | Status: SHIPPED | OUTPATIENT
Start: 2023-09-07

## 2023-09-07 RX ADMIN — ONDANSETRON 4 MG: 2 INJECTION INTRAMUSCULAR; INTRAVENOUS at 06:53

## 2023-09-07 RX ADMIN — IOPAMIDOL 100 ML: 755 INJECTION, SOLUTION INTRAVENOUS at 05:44

## 2023-09-07 NOTE — ED PROVIDER NOTES
Time: 4:40 AM EDT  Date of encounter:  9/7/2023  Independent Historian/Clinical History and Information was obtained by:   Patient    History is limited by: N/A    Chief Complaint: Nausea vomiting and diarrhea      History of Present Illness:  Patient is a 78 y.o. year old female who presents to the emergency department for evaluation of nausea vomiting diarrhea.    Patient states since last night she has had severe nausea with vomiting and diffuse watery diarrhea.  She has had crampy abdominal pain ever since.  She took Phenergan at home with some relief and Zofran via EMS with improvement of her symptoms.  She states she is thirsty and hungry at this time.  She denies fevers or chills.  She ate new food from the grocery store which she has not had before.  She has a grandson with COVID but does not believe that she was exposed.    HPI    Patient Care Team  Primary Care Provider: Chris Elder MD    Past Medical History:     Allergies   Allergen Reactions    Sulfa Antibiotics Rash and Hives     Past Medical History:   Diagnosis Date    Allergic rhinitis due to pollen     Atrophy of thyroid (acquired)     Chronic fatigue     Colitis     Difficulty walking 2018    Disease of thyroid gland     DM (diabetes mellitus)     Fracture, foot Apr 4 2022    Fell in yard Mon morning. Sorta loss balance. Right foot on side    Gallstones     Hypercholesterolemia     Hyperlipidemia     Hypertension     Hypothyroid     Impaired fasting glucose     Mixed hyperlipidemia     Syncope 2016?    Thyroid nodule     Unspecified osteoarthritis, unspecified site     Vitamin D deficiency, unspecified      Past Surgical History:   Procedure Laterality Date    CATARACT EXTRACTION      12/14/2011 Left Cataract Surgery 11/30/2011 right eye cataract surgery    COLONOSCOPY      11/2020 colonoscopy    LEEP  2000    OTHER SURGICAL HISTORY      FNA     Family History   Problem Relation Age of Onset    Colon cancer Neg Hx        Home  Medications:  Prior to Admission medications    Medication Sig Start Date End Date Taking? Authorizing Provider   acetaminophen (TYLENOL) 500 MG tablet Take 1 tablet by mouth Every 6 (Six) Hours As Needed for Mild Pain.    Berta Madera MD   escitalopram (LEXAPRO) 5 MG tablet Take 1 tablet by mouth Daily. 8/24/23   Chris Elder MD   fluticasone (FLONASE) 50 MCG/ACT nasal spray 2 sprays by Each Nare route As Needed.    Berta Madera MD   levothyroxine (SYNTHROID, LEVOTHROID) 100 MCG tablet Take 1 tablet by mouth Every Morning. 8/24/23   Chris Elder MD   metoprolol succinate XL (TOPROL-XL) 25 MG 24 hr tablet Take 1 tablet by mouth Daily. 8/24/23   Chris Elder MD   multivitamin with minerals tablet tablet Take 1 tablet by mouth Daily.    Berta Madera MD   psyllium (METAMUCIL) 0.52 g capsule Take 1 capsule by mouth Daily.    Berta Madera MD   simvastatin (ZOCOR) 10 MG tablet Take 1 tablet by mouth Every Other Day. 4/28/23   Chris Elder MD        Social History:   Social History     Tobacco Use    Smoking status: Never     Passive exposure: Yes    Smokeless tobacco: Never   Vaping Use    Vaping Use: Never used   Substance Use Topics    Alcohol use: Not Currently    Drug use: Never         Review of Systems:  Review of Systems   Constitutional:  Negative for chills and fever.   HENT:  Negative for congestion, ear pain and sore throat.    Eyes:  Negative for pain.   Respiratory:  Negative for cough, chest tightness and shortness of breath.    Cardiovascular:  Negative for chest pain.   Gastrointestinal:  Positive for abdominal pain, diarrhea, nausea and vomiting.   Genitourinary:  Negative for flank pain and hematuria.   Musculoskeletal:  Negative for joint swelling.   Skin:  Negative for pallor.   Neurological:  Negative for seizures and headaches.   All other systems reviewed and are negative.     Physical Exam:  /71   Pulse 69   Temp 98.6 °F (37 °C) (Oral)   Resp 17   " Ht 157.5 cm (62\")   Wt 82.2 kg (181 lb 4.8 oz)   SpO2 94%   BMI 33.16 kg/m²     Physical Exam  Vitals and nursing note reviewed.   Constitutional:       General: She is not in acute distress.     Appearance: Normal appearance. She is not toxic-appearing.   HENT:      Head: Normocephalic and atraumatic.      Jaw: There is normal jaw occlusion.   Eyes:      General: Lids are normal.      Extraocular Movements: Extraocular movements intact.      Conjunctiva/sclera: Conjunctivae normal.      Pupils: Pupils are equal, round, and reactive to light.   Cardiovascular:      Rate and Rhythm: Normal rate and regular rhythm.      Pulses: Normal pulses.      Heart sounds: Normal heart sounds.   Pulmonary:      Effort: Pulmonary effort is normal. No respiratory distress.      Breath sounds: Normal breath sounds. No wheezing or rhonchi.   Abdominal:      General: Abdomen is flat.      Palpations: Abdomen is soft.      Tenderness: There is abdominal tenderness. There is no guarding or rebound.      Comments: Mild lower abdominal tenderness   Musculoskeletal:         General: Normal range of motion.      Cervical back: Normal range of motion and neck supple.      Right lower leg: No edema.      Left lower leg: No edema.   Skin:     General: Skin is warm and dry.   Neurological:      Mental Status: She is alert and oriented to person, place, and time. Mental status is at baseline.   Psychiatric:         Mood and Affect: Mood normal.             Procedures:  Procedures      Medical Decision Making:      Comorbidities that affect care:    Hypertension, hyperlipidemia, diabetes, thyroid disease, hypercholesterolemia    External Notes reviewed:    Previous Clinic Note: PCP visit August 24, 2023      The following orders were placed and all results were independently analyzed by me:  Orders Placed This Encounter   Procedures    Urine Culture - Urine,    COVID-19,CEPHEID/YOSI,COR/TERE/PAD/ZIGGY/MAD IN-HOUSE(OR EMERGENT/ADD-ON),NP SWAB IN " TRANSPORT MEDIA 3-4 HR TAT, RT-PCR - Swab, Nasopharynx    CT Abdomen Pelvis With Contrast    [place] Draw    Comprehensive Metabolic Panel    Lipase    Urinalysis With Microscopic If Indicated (No Culture) - Urine, Clean Catch    Lactic Acid, Plasma    CBC Auto Differential    STAT Lactic Acid, Reflex    Urinalysis, Microscopic Only - Urine, Clean Catch    NPO Diet NPO Type: Strict NPO    Undress & Gown    Insert Peripheral IV    CBC & Differential    Green Top (Gel)    Lavender Top    Gold Top - SST    Light Blue Top       Medications Given in the Emergency Department:  Medications   sodium chloride 0.9 % flush 10 mL (has no administration in time range)   iopamidol (ISOVUE-370) 76 % injection 100 mL (100 mL Intravenous Given 9/7/23 0544)   ondansetron (ZOFRAN) injection 4 mg (4 mg Intravenous Given 9/7/23 0653)        ED Course:         Labs:    Lab Results (last 24 hours)       Procedure Component Value Units Date/Time    CBC & Differential [[id number]]  (Abnormal) Collected: 09/07/23 0220    Specimen: Blood Updated: 09/07/23 0231    Narrative:      The following orders were created for panel order CBC & Differential.  Procedure                               Abnormality         Status                     ---------                               -----------         ------                     CBC Auto Differential[[id number]]        Abnormal            Final result                 Please view results for these tests on the individual orders.    Comprehensive Metabolic Panel [[id number]]  (Abnormal) Collected: 09/07/23 0220    Specimen: Blood Updated: 09/07/23 0254     Glucose 153 mg/dL      BUN 14 mg/dL      Creatinine 0.78 mg/dL      Sodium 139 mmol/L      Potassium 4.1 mmol/L      Chloride 101 mmol/L      CO2 30.7 mmol/L      Calcium 9.5 mg/dL      Total Protein 7.0 g/dL      Albumin 4.0 g/dL      ALT (SGPT) 14 U/L      AST (SGOT) 20 U/L      Alkaline Phosphatase 51 U/L      Total Bilirubin 0.9 mg/dL      Globulin  3.0 gm/dL      A/G Ratio 1.3 g/dL      BUN/Creatinine Ratio 17.9     Anion Gap 7.3 mmol/L      eGFR 76.9 mL/min/1.73     Narrative:      GFR Normal >60  Chronic Kidney Disease <60  Kidney Failure <15    The GFR formula is only valid for adults with stable renal function between ages 18 and 70.    Lipase [[id number]]  (Normal) Collected: 09/07/23 0220    Specimen: Blood Updated: 09/07/23 0254     Lipase 36 U/L     Lactic Acid, Plasma [[id number]]  (Abnormal) Collected: 09/07/23 0220    Specimen: Blood Updated: 09/07/23 0305     Lactate 2.6 mmol/L     CBC Auto Differential [[id number]]  (Abnormal) Collected: 09/07/23 0220    Specimen: Blood Updated: 09/07/23 0231     WBC 9.31 10*3/mm3      RBC 4.53 10*6/mm3      Hemoglobin 13.7 g/dL      Hematocrit 41.1 %      MCV 90.7 fL      MCH 30.2 pg      MCHC 33.3 g/dL      RDW 14.8 %      RDW-SD 49.1 fl      MPV 9.7 fL      Platelets 228 10*3/mm3      Neutrophil % 89.2 %      Lymphocyte % 4.2 %      Monocyte % 5.5 %      Eosinophil % 0.6 %      Basophil % 0.2 %      Immature Grans % 0.3 %      Neutrophils, Absolute 8.30 10*3/mm3      Lymphocytes, Absolute 0.39 10*3/mm3      Monocytes, Absolute 0.51 10*3/mm3      Eosinophils, Absolute 0.06 10*3/mm3      Basophils, Absolute 0.02 10*3/mm3      Immature Grans, Absolute 0.03 10*3/mm3      nRBC 0.0 /100 WBC     Urinalysis With Microscopic If Indicated (No Culture) - Urine, Clean Catch [[id number]]  (Abnormal) Collected: 09/07/23 0346    Specimen: Urine, Clean Catch Updated: 09/07/23 0355     Color, UA Yellow     Appearance, UA Clear     pH, UA 7.0     Specific Gravity, UA 1.016     Glucose, UA Negative     Ketones, UA Trace     Bilirubin, UA Negative     Blood, UA Negative     Protein, UA Negative     Leuk Esterase, UA Moderate (2+)     Nitrite, UA Negative     Urobilinogen, UA 1.0 E.U./dL    Urinalysis, Microscopic Only - Urine, Clean Catch [[id number]]  (Abnormal) Collected: 09/07/23 0346    Specimen: Urine, Clean Catch Updated:  09/07/23 0357     RBC, UA 3-5 /HPF      WBC, UA 6-12 /HPF      Bacteria, UA None Seen /HPF      Squamous Epithelial Cells, UA 3-6 /HPF      Hyaline Casts, UA 0-2 /LPF      Methodology Automated Microscopy    Urine Culture - Urine, Urine, Clean Catch [[id number]] Collected: 09/07/23 0346    Specimen: Urine, Clean Catch Updated: 09/07/23 0436    COVID-19,CEPHEID/YOSI,COR/TERE/PAD/ZGIGY/MAD IN-HOUSE(OR EMERGENT/ADD-ON),NP SWAB IN TRANSPORT MEDIA 3-4 HR TAT, RT-PCR - Swab, Nasopharynx [[id number]]  (Normal) Collected: 09/07/23 0509    Specimen: Swab from Nasopharynx Updated: 09/07/23 0654     COVID19 Not Detected    Narrative:      Fact sheet for providers: https://www.fda.gov/media/888208/download     Fact sheet for patients: https://www.fda.gov/media/699801/download  Fact sheet for providers: https://www.fda.gov/media/705880/download     Fact sheet for patients: https://www.fda.gov/media/973115/download    STAT Lactic Acid, Reflex [[id number]]  (Normal) Collected: 09/07/23 0514    Specimen: Blood Updated: 09/07/23 0538     Lactate 1.4 mmol/L              Imaging:    CT Abdomen Pelvis With Contrast    Result Date: 9/7/2023  PROCEDURE: CT ABDOMEN PELVIS W CONTRAST  COMPARISON: 4/28/2004.  INDICATIONS: 78-year-old female w/ h/o diffuse abdominal pain & vomiting since 9/6/2023.  TECHNIQUE: After obtaining the patient's consent, 873 CT images were created with non-ionic intravenous contrast material.  No oral contrast agent was administered for the study.  PROTOCOL:   Standard CT imaging protocol performed.    RADIATION:   Total DLP: 898.7 mGy*cm   Automated exposure control was utilized to minimize radiation dose. CONTRAST: 75 mL Isovue 370 I.V.  FINDINGS: There is a small hiatal hernia.  There is some indistinctness of the wall of the distal esophagus as well as the small hiatal hernia.  Age-indeterminate esophagitis cannot be excluded.  Please correlate clinically.  There may be mild cardiomegaly.  Mild fluid distension of  small bowel and colon is seen, which may represent a mild age-indeterminate but possibly acute to subacute infectious/inflammatory enterocolitis.  No pneumatosis portal or mesenteric venous gas is seen to suggest ischemic enterocolitis.  No mechanical bowel obstruction.  No pneumoperitoneum.  No pericolonic or perienteric fluid collections are seen to suggest abscess.  Colonic diverticula are present, especially involving the left colon.  No acute diverticulitis.  Please note that the colon is redundant.  The cecum extends across midline and resides in the left lower quadrant, such as seen on image 122 of series 201, image 97 of series 202, image 132 of series 203, image 123 of series 301, and adjacent images.  This finding is new since the prior 4/28/2004 CT study.  The appendix is not clearly identified.  Please correlate with the surgical history.  There may be a generalized adynamic ileus.  There may be diffuse hepatic steatosis.  Probably no hepatomegaly.  No splenomegaly.  Gallstones are suggested without acute cholecystitis.  No acute pancreatitis.  No biliary ductal dilatation.  No adrenal mass.  No aneurysmal dilatation of the aortoiliac arterial system.  Atherosclerotic changes are present.  No acute intraperitoneal or retroperitoneal hemorrhage.  No suspicious uterine or adnexal mass.  Degenerative changes are seen throughout the imaged spine.  There may be diffuse idiopathic skeletal hyperostosis (DISH).  Degenerative changes involve the hip joints and the bilateral sacroiliac joints.  No acute fracture.  No aggressive osseous lesion is suggested.  There may be pubic osteitis.  There may be atelectasis and/or fibrosis in the lung bases.  No acute infiltrate is suggested.  No nephrolithiasis or ureterolithiasis.  No hydronephrosis.  No acute pyelonephritis.  There is probably a benign 1.9 cm anterior lower pole right renal cyst, seen previously.  It has a CT number of about 11 Hounsfield units or slightly  less on the initial phase of the study.  Its CT number on the delayed phase is about eighteen Hounsfield units or less as seen on image 118 of series 301.        1. Age-indeterminate esophagitis is possible.  2. A mild age-indeterminate infectious/inflammatory enterocolitis is possible.  There may be an associated generalized adynamic ileus.  A mechanical bowel obstruction is thought to be unlikely.  No pneumoperitoneum or pneumatosis.  3. The appendix is not clearly identified.  It may be surgically absent.  Please correlate clinically.  4. No suspicious uterine or adnexal mass.  5. There are gallstones without acute cholecystitis.  No acute pancreatitis.  6. No hydronephrosis or obstructive uropathy.  No ureterolithiasis.  No acute pyelonephritis.  No urinary bladder calculi.  7. No definite ascites.  No acute intraperitoneal or retroperitoneal hemorrhage.  8. There may be mild cardiomegaly.  9. Please see above comments for further detail   1.   Please note that portions of this note were completed with a voice recognition program.  PAM ABDI JR, MD       Electronically Signed and Approved By: PAM ABDI JR, MD on 9/07/2023 at 6:30                 Differential Diagnosis and Discussion:    Abdominal Pain: Based on the patient's signs and symptoms, I considered abdominal aortic aneurysm, small bowel obstruction, pancreatitis, acute cholecystitis, acute appendecitis, peptic ulcer disease, gastritis, colitis, endocrine disorders, irritable bowel syndrome and other differential diagnosis an etiology of the patient's abdominal pain.  Diarrhea: Differential diagnosis includes but is not limited to malabsorption syndrome, bacterial infection, carcinoid syndrome, pancreatic hypersecretion, viral infection, celiac sprue, Crohn's disease, ulcerative colitis, ischemic colitis, colitis, hypermotility, and irritable bowel syndrome.  Vomiting: Differential diagnosis includes but is not limited to migraine, labyrinthine  disorders, psychogenic, metabolic and endocrine causes, peptic ulcer, gastric outlet obstruction, gastritis, gastroenteritis, appendicitis, intestinal obstruction, paralytic ileus, food poisoning, cholecystitis, acute hepatitis, acute pancreatitis, acute febrile illness, and myocardial infarction.    All labs were reviewed and interpreted by me.  CT scan radiology impression was interpreted by me.    MDM           Patient Care Considerations:    ANTIBIOTICS: I considered prescribing antibiotics as an outpatient however no evidence of bacterial infection      Consultants/Shared Management Plan:    None    Social Determinants of Health:    Patient is independent, reliable, and has access to care.       Disposition and Care Coordination:    Discharged: The patient is suitable and stable for discharge with no need for consideration of observation or admission.    I have explained the patient´s condition, diagnoses and treatment plan based on the information available to me at this time. I have answered questions and addressed any concerns. The patient has a good  understanding of the patient´s diagnosis, condition, and treatment plan as can be expected at this point. The vital signs have been stable. The patient´s condition is stable and appropriate for discharge from the emergency department.      The patient will pursue further outpatient evaluation with the primary care physician or other designated or consulting physician as outlined in the discharge instructions. They are agreeable to this plan of care and follow-up instructions have been explained in detail. The patient has received these instructions in written format and have expressed an understanding of the discharge instructions. The patient is aware that any significant change in condition or worsening of symptoms should prompt an immediate return to this or the closest emergency department or call to 911.  I have explained discharge medications and the need  for follow up with the patient/caretakers. This was also printed in the discharge instructions. Patient was discharged with the following medications and follow up:      Medication List        New Prescriptions      dicyclomine 10 MG capsule  Commonly known as: BENTYL  Take 1 capsule by mouth 3 (Three) Times a Day As Needed for Abdominal Cramping.     ondansetron ODT 4 MG disintegrating tablet  Commonly known as: ZOFRAN-ODT  Place 1 tablet on the tongue 4 (Four) Times a Day As Needed for Nausea or Vomiting.               Where to Get Your Medications        These medications were sent to [facility] - [address] - [phone]  - [phone]  [address] [place] [address]      Phone: [phone]   dicyclomine 10 MG capsule  ondansetron ODT 4 MG disintegrating tablet      Chris Elder MD  [address]  [place] [address]  [phone]    Schedule an appointment as soon as possible for a visit          Final diagnoses:   Acute gastroenteritis        ED Disposition       ED Disposition   Discharge    Condition   Stable    Comment   --               This medical record created using voice recognition software.             Lonny Madden MD  09/07/23 0860

## 2023-09-08 LAB — BACTERIA SPEC AEROBE CULT: NORMAL

## 2023-09-11 ENCOUNTER — PATIENT OUTREACH (OUTPATIENT)
Dept: CASE MANAGEMENT | Facility: OTHER | Age: 78
End: 2023-09-11
Payer: MEDICARE

## 2023-09-11 ENCOUNTER — OFFICE VISIT (OUTPATIENT)
Dept: INTERNAL MEDICINE | Facility: CLINIC | Age: 78
End: 2023-09-11
Payer: MEDICARE

## 2023-09-11 VITALS
TEMPERATURE: 96.6 F | HEART RATE: 61 BPM | SYSTOLIC BLOOD PRESSURE: 144 MMHG | OXYGEN SATURATION: 98 % | DIASTOLIC BLOOD PRESSURE: 70 MMHG | BODY MASS INDEX: 32.83 KG/M2 | HEIGHT: 62 IN | WEIGHT: 178.4 LBS

## 2023-09-11 DIAGNOSIS — Z09 HOSPITAL DISCHARGE FOLLOW-UP: Primary | ICD-10-CM

## 2023-09-11 DIAGNOSIS — I10 PRIMARY HYPERTENSION: Primary | ICD-10-CM

## 2023-09-11 DIAGNOSIS — R19.7 DIARRHEA, UNSPECIFIED TYPE: ICD-10-CM

## 2023-09-11 DIAGNOSIS — I10 PRIMARY HYPERTENSION: ICD-10-CM

## 2023-09-11 DIAGNOSIS — R11.0 NAUSEA: ICD-10-CM

## 2023-09-11 DIAGNOSIS — E11.9 TYPE 2 DIABETES MELLITUS WITHOUT COMPLICATION, WITHOUT LONG-TERM CURRENT USE OF INSULIN: ICD-10-CM

## 2023-09-11 DIAGNOSIS — E78.2 MIXED HYPERLIPIDEMIA: ICD-10-CM

## 2023-09-11 PROCEDURE — 3077F SYST BP >= 140 MM HG: CPT | Performed by: INTERNAL MEDICINE

## 2023-09-11 PROCEDURE — 99213 OFFICE O/P EST LOW 20 MIN: CPT | Performed by: INTERNAL MEDICINE

## 2023-09-11 PROCEDURE — 1160F RVW MEDS BY RX/DR IN RCRD: CPT | Performed by: INTERNAL MEDICINE

## 2023-09-11 PROCEDURE — 3078F DIAST BP <80 MM HG: CPT | Performed by: INTERNAL MEDICINE

## 2023-09-11 PROCEDURE — 1159F MED LIST DOCD IN RCRD: CPT | Performed by: INTERNAL MEDICINE

## 2023-09-11 NOTE — ASSESSMENT & PLAN NOTE
Patient was seen in the ER last week for what sounds like an episode of acute gastroenteritis.  She had acute onset of nausea vomiting and watery diarrhea.  She had imaging studies to include CT of abdomen and pelvis in the ER which did not reveal any bowel obstruction, there was some inflammation noted in the esophagus and in the colon.  She was treated conservatively with Zofran and dicyclomine, discharged with prescriptions for same, but has not needed to utilize them.      Patient is gotten rid of the other food that she thinks may have brought this on, that certainly appropriate.  Do not see any indication for repeat imaging studies nor labs presently.

## 2023-09-11 NOTE — ASSESSMENT & PLAN NOTE
Blood pressure stable and her heart rate is 61 as of her 9/23 hospital follow-up.  She is fine to continue just low-dose metoprolol.

## 2023-09-11 NOTE — OUTREACH NOTE
AMBULATORY CASE MANAGEMENT NOTE    Name and Relationship of Patient/Support Person: Kelsey Durand - Self    CCM Interim Update      Patient called and stated that she had been seen in the ED at [facility] on 9-7-2023 the ED note reads as follows : Patient is a 78 y.o. year old female who presents to the emergency department for evaluation of nausea vomiting diarrhea.Patient states since last night she has had severe nausea with vomiting and diffuse watery diarrhea.  She has had crampy abdominal pain ever since.  She took Phenergan at home with some relief and Zofran via EMS with improvement of her symptoms.  She states she is thirsty and hungry at this time.  She denies fevers or chills.  She ate new food from the grocery store which she has not had before.  She has a grandson with COVID but does not believe that she was exposed.    Patient stated that she was advised to follow up with PCP post DC from the hospital CM to make follow up appt for patient .       Care Coordination    CM made appt for follow up with PCP         Education Documentation  No documentation found.        JEFFERY YOUNGBLOOD  Ambulatory Case Management    9/11/2023, 10:31 EDT

## 2023-09-11 NOTE — PROGRESS NOTES
Chief Complaint  Pt was in ER on Wednesday night, for vomiting and diarrhea (She states that she is feeling better, this is a follow up. She thinks that she has food poisoning, she ate a smart one that she had thawed out and then she refrozen.)    Subjective          Kelsey Durand presents to [facility]     History of Present Illness:  Patient is a pleasant 78-year-old female with underlying hyperlipidemia, hypothyroidism, IFG, among others, who is coming in 8/23 for her routine 4-month follow-up.  We will review her medication list, go over her labs in detail, and address other concerns at that time.---> Patient is being seen in 9/23 for ER follow-up:    Patient states since last night she has had severe nausea with vomiting and diffuse watery diarrhea.  She has had crampy abdominal pain ever since.  She took Phenergan at home with some relief and Zofran via EMS with improvement of her symptoms.  She states she is thirsty and hungry at this time.  She denies fevers or chills.  She ate new food from the grocery store which she has not had before.  She has a grandson with COVID but does not believe that she was exposed.    CT abdomen and pelvis:  1. Age-indeterminate esophagitis is possible.    2. A mild age-indeterminate infectious/inflammatory enterocolitis is possible.  There may be an   associated generalized adynamic ileus.  A mechanical bowel obstruction is thought to be unlikely.    No pneumoperitoneum or pneumatosis.         Review of Systems   Constitutional:  Negative for appetite change, fatigue and fever.   HENT:  Negative for congestion and ear pain.    Eyes:  Negative for blurred vision.   Respiratory:  Negative for cough, chest tightness, shortness of breath and wheezing.    Cardiovascular:  Negative for chest pain, palpitations and leg swelling.   Gastrointestinal:  Negative for abdominal pain.   Genitourinary:  Negative for difficulty urinating, dysuria and  "hematuria.   Musculoskeletal:  Negative for arthralgias and gait problem.   Skin:  Negative for skin lesions.   Neurological:  Negative for syncope, memory problem and confusion.   Psychiatric/Behavioral:  Negative for self-injury and depressed mood.      Objective   Vital Signs:   /70   Pulse 61   Temp 96.6 °F (35.9 °C) (Skin)   Ht 157.5 cm (62.01\")   Wt 80.9 kg (178 lb 6.4 oz)   SpO2 98%   BMI 32.62 kg/m²           Physical Exam  Vitals and nursing note reviewed.   Constitutional:       General: She is not in acute distress.     Appearance: Normal appearance. She is not toxic-appearing.   HENT:      Head: Atraumatic.      Right Ear: External ear normal.      Left Ear: External ear normal.      Nose: Nose normal.      Mouth/Throat:      Mouth: Mucous membranes are moist.   Eyes:      General:         Right eye: No discharge.         Left eye: No discharge.      Extraocular Movements: Extraocular movements intact.      Pupils: Pupils are equal, round, and reactive to light.   Cardiovascular:      Rate and Rhythm: Normal rate and regular rhythm.      Pulses: Normal pulses.      Heart sounds: Normal heart sounds. No murmur heard.    No gallop.   Pulmonary:      Effort: Pulmonary effort is normal. No respiratory distress.      Breath sounds: No wheezing, rhonchi or rales.   Abdominal:      General: There is no distension.      Palpations: Abdomen is soft. There is no mass.      Tenderness: There is no abdominal tenderness. There is no guarding.   Musculoskeletal:         General: No swelling or tenderness.      Cervical back: No tenderness.      Right lower leg: No edema.      Left lower leg: No edema.   Skin:     General: Skin is warm and dry.      Findings: No rash.   Neurological:      General: No focal deficit present.      Mental Status: She is alert and oriented to person, place, and time. Mental status is at baseline.      Motor: No weakness.      Gait: Gait normal.   Psychiatric:         Mood and " "Affect: Mood normal.         Thought Content: Thought content normal.        Result Review :   The following data was reviewed by: Chris Elder MD on 10/18/2021:                  Assessment and Plan    Diagnoses and all orders for this visit:    1. Hospital discharge follow-up (Primary)  Assessment & Plan:  Patient was seen in the ER last week for what sounds like an episode of acute gastroenteritis.  She had acute onset of nausea vomiting and watery diarrhea.  She had imaging studies to include CT of abdomen and pelvis in the ER which did not reveal any bowel obstruction, there was some inflammation noted in the esophagus and in the colon.  She was treated conservatively with Zofran and dicyclomine, discharged with prescriptions for same, but has not needed to utilize them.      Patient is gotten rid of the other food that she thinks may have brought this on, that certainly appropriate.  Do not see any indication for repeat imaging studies nor labs presently.      2. Primary hypertension  Assessment & Plan:  Blood pressure stable and her heart rate is 61 as of her 9/23 hospital follow-up.  She is fine to continue just low-dose metoprolol.         --  --  OLDER NOTES:  VISIT 4/21:  ANNUAL MEDICARE WELLNESS PHYSICAL 4/19 = reviewed all forms in office with pt; no new discoveries; active at mall=class and walks/ bike at home.  H.M. ISSUES:  BMI 30 and d/w watch carbs and walk.  --  \"HYPERTENSION\" controlled=better at home='s (off meds good while)---> d/w check occ and let us know=fine at home as of 4/21.    LIPIDS at goal with LDL 68...101 so f/u before change...93 fine---> 80 stable 4/21.  --  HYPOTHYROIDISM stable 10/16 with TSH 0.2 still=on same dose many years---> fine 4/21.  IFG mild/stable = FBS 92 with A1C 6.0---> 6.3 in 4/20---> 6.0 in 4/21.  --  ANXIETY D/O 1/19 and has benzo to use PRN; d/w call if feels she needs to use it too often and will add SSRI...cheerful 10/19...will use low dose lexapro as " of 4/20 OV---> seems better 10/20.  B12 KKT=871 in 4/20.  --  SYNCOPE is ? seizure per Dr Valadez/Flash in [facility] over the weekend, so will get EEG (tongue bite/incontinent)...EEG was neg 9/15.  --  BMD 5/17 = spine 0.9, hip 0.6 is great---> BMD 7/20 = spine 1.0, hip -0.3 = great!!  VIT D DEF remains stable on 2000 qd...28 and d/w take routinely please...44---> same 10/20.  --  DJD no new c/o.  R KNEE PAIN with no trauma, comes and goes, exam is w/o laxity, so agree with repeat mobic and use brace and then PTA if no better.  --  A.R. no flare.  --  --  MMG 5/4/2023 per Dr Lawler/Fabiola Pacheco.  COLON 11/19...polyp/FH+ = daughter = 5 years per Dr. Streeter  Adacel 3/14, Prevnar '16; Pneumovax #1 10/17; Hep A x2 10/18; d/w Shingrix 10/18;   ( Samuel 7/13, 2 girls here=Leigh Ann (no kids) and Xiao = youngest is boy in 9th grade 12/22 and girl with Asperger's is at [facility]).    Follow Up   Return for Next scheduled follow up.  Patient was given instructions and counseling regarding her condition or for health maintenance advice. Please see specific information pulled into the AVS if appropriate.

## 2023-09-28 ENCOUNTER — PATIENT OUTREACH (OUTPATIENT)
Dept: CASE MANAGEMENT | Facility: OTHER | Age: 78
End: 2023-09-28
Payer: MEDICARE

## 2023-09-28 ENCOUNTER — LAB (OUTPATIENT)
Dept: LAB | Facility: HOSPITAL | Age: 78
End: 2023-09-28
Payer: MEDICARE

## 2023-09-28 DIAGNOSIS — I10 PRIMARY HYPERTENSION: Primary | ICD-10-CM

## 2023-09-28 DIAGNOSIS — G62.9 SENSORY NEUROPATHY: ICD-10-CM

## 2023-09-28 DIAGNOSIS — E11.9 TYPE 2 DIABETES MELLITUS WITHOUT COMPLICATION, WITHOUT LONG-TERM CURRENT USE OF INSULIN: ICD-10-CM

## 2023-09-28 PROCEDURE — 84165 PROTEIN E-PHORESIS SERUM: CPT

## 2023-09-28 PROCEDURE — 82607 VITAMIN B-12: CPT

## 2023-09-28 PROCEDURE — 82784 ASSAY IGA/IGD/IGG/IGM EACH: CPT

## 2023-09-28 PROCEDURE — 82746 ASSAY OF FOLIC ACID SERUM: CPT

## 2023-09-28 PROCEDURE — 36415 COLL VENOUS BLD VENIPUNCTURE: CPT

## 2023-09-28 PROCEDURE — 84155 ASSAY OF PROTEIN SERUM: CPT

## 2023-09-28 PROCEDURE — 86334 IMMUNOFIX E-PHORESIS SERUM: CPT

## 2023-09-28 NOTE — OUTREACH NOTE
[facility] End of Month Documentation    This Chronic Medical Management Care Plan for Kelsey Durand, 78 y.o. female, has been monitored and managed; reviewed and a new plan of care implemented for the month of September.  A cumulative time of 20  minutes was spent on this patient record this month, including chart review; phone call with patient; electronic communication with primary care provider.    Regarding the patient's problems: has Acquired atrophy of thyroid; Mixed hyperlipidemia; Vitamin D deficiency; Primary osteoarthritis involving multiple joints; Hospital discharge follow-up; Recurrent major depression in partial remission; Primary hypertension; Gastroesophageal reflux disease without esophagitis; Gallstones; Right upper quadrant abdominal pain; Nausea and vomiting; Neurologic gait dysfunction; Medicare annual wellness visit, subsequent; Sensory neuropathy; and IFG (impaired fasting glucose) on their problem list., the following items were addressed: medical records; medications and any changes can be found within the plan section of the note.  A detailed listing of time spent for chronic care management is tracked within each outreach encounter.  Current medications include:  has a current medication list which includes the following prescription(s): acetaminophen, dicyclomine, escitalopram, fluticasone, levothyroxine, metoprolol succinate xl, multivitamin with minerals, ondansetron odt, psyllium, and simvastatin. and the patient is reported to be patient is compliant with medication protocol,  Medications are reported to be effective.  Regarding these diagnoses, referrals were made to the following provider(s):  Physical Therapy.  All notes on chart for PCP to review.    The patient was monitored remotely for blood pressure; blood glucose, nausea.    The patient's physical needs include:  needs met.     The patient's mental support needs include:  needs met; continued support    The patient's  cognitive support needs include:  not applicable    The patient's psychosocial support needs include:  needs met    The patient's functional needs include: needs met    The patient's environmental needs include:  not applicable    Care Plan overall comments:  Care plan in place    Refer to previous outreach notes for more information on the areas listed above.    Monthly Billing Diagnoses  (I10) Primary hypertension    (E11.9) Type 2 diabetes mellitus without complication, without long-term current use of insulin    Medications   Medications have been reconciled    Care Plan progress this month:      Recently Modified Care Plans Updates made since 8/28/2023 12:00 AM      No recently modified care plans.          Instructions   Patient was provided an electronic copy of care plan  CCM services were explained and offered and patient has accepted these services.  Patient has given their written consent to receive CCM services and understands that this includes the authorization of electronic communication of medical information with the other treating providers.  Patient understands that they may stop CCM services at any time and these changes will be effective at the end of the calendar month and will effectively revocate the agreement of CCM services.  Patient understands that only one practitioner can furnish and be paid for CCM services during one calendar month.  Patient also understands that there may be co-payment or deductible fees in association with CCM services.  Patient will continue with at least monthly follow-up calls with the Ambulatory .    Doris WOODY  Ambulatory Case Management    9/28/2023, 15:26 EDT

## 2023-09-29 LAB
FOLATE SERPL-MCNC: >20 NG/ML (ref 4.78–24.2)
VIT B12 BLD-MCNC: 290 PG/ML (ref 211–946)

## 2023-10-02 LAB
ALBUMIN SERPL ELPH-MCNC: 3.2 G/DL (ref 2.9–4.4)
ALBUMIN/GLOB SERPL: 1 {RATIO} (ref 0.7–1.7)
ALPHA1 GLOB SERPL ELPH-MCNC: 0.2 G/DL (ref 0–0.4)
ALPHA2 GLOB SERPL ELPH-MCNC: 0.7 G/DL (ref 0.4–1)
B-GLOBULIN SERPL ELPH-MCNC: 0.9 G/DL (ref 0.7–1.3)
GAMMA GLOB SERPL ELPH-MCNC: 1.6 G/DL (ref 0.4–1.8)
GLOBULIN SER-MCNC: 3.5 G/DL (ref 2.2–3.9)
IGA SERPL-MCNC: 152 MG/DL (ref 64–422)
IGG SERPL-MCNC: 1544 MG/DL (ref 586–1602)
IGM SERPL-MCNC: 46 MG/DL (ref 26–217)
INTERPRETATION SERPL IEP-IMP: NORMAL
LABORATORY COMMENT REPORT: NORMAL
M PROTEIN SERPL ELPH-MCNC: NORMAL G/DL
PROT SERPL-MCNC: 6.7 G/DL (ref 6–8.5)

## 2023-10-05 ENCOUNTER — TELEPHONE (OUTPATIENT)
Dept: CASE MANAGEMENT | Facility: OTHER | Age: 78
End: 2023-10-05
Payer: MEDICARE

## 2023-10-05 ENCOUNTER — PATIENT OUTREACH (OUTPATIENT)
Dept: CASE MANAGEMENT | Facility: OTHER | Age: 78
End: 2023-10-05
Payer: MEDICARE

## 2023-10-05 DIAGNOSIS — I10 PRIMARY HYPERTENSION: Primary | ICD-10-CM

## 2023-10-05 DIAGNOSIS — E78.2 MIXED HYPERLIPIDEMIA: ICD-10-CM

## 2023-10-05 DIAGNOSIS — E11.9 TYPE 2 DIABETES MELLITUS WITHOUT COMPLICATION, WITHOUT LONG-TERM CURRENT USE OF INSULIN: ICD-10-CM

## 2023-10-05 NOTE — OUTREACH NOTE
AMBULATORY CASE MANAGEMENT NOTE    Name and Relationship of Patient/Support Person: Durand Kelsey Veena - Self    Care Coordination    CCM Interim Update      Patient called back and stated that she was feeling well. Stated that she has had no further issues with nausea or vomiting . She stated she believes its something she ate . She states that she bought food and brought it home with out chilling it states she believes it spoiled on the way home and caused her to get sick. Patient stated she tossed the items out so as not to eat them. Chart reviewed , meds reviewed, patient stated no further needs at this time.All future appts reviewed . All questions were answered and contact information provided . Future outreach scheduled .             Education Documentation  No documentation found.        JEFFERY YOUNGBLOOD  Ambulatory Case Management    10/5/2023, 15:01 EDT

## 2023-11-02 ENCOUNTER — APPOINTMENT (OUTPATIENT)
Dept: GENERAL RADIOLOGY | Facility: HOSPITAL | Age: 78
DRG: 177 | End: 2023-11-02
Payer: MEDICARE

## 2023-11-02 ENCOUNTER — HOSPITAL ENCOUNTER (INPATIENT)
Facility: HOSPITAL | Age: 78
LOS: 2 days | Discharge: HOME | DRG: 177 | End: 2023-11-04
Attending: EMERGENCY MEDICINE | Admitting: INTERNAL MEDICINE
Payer: MEDICARE

## 2023-11-02 DIAGNOSIS — R09.02 HYPOXIA: ICD-10-CM

## 2023-11-02 DIAGNOSIS — U07.1 COVID-19: Primary | ICD-10-CM

## 2023-11-02 LAB
ALBUMIN SERPL-MCNC: 3.7 G/DL (ref 3.5–5.2)
ALBUMIN/GLOB SERPL: 1.1 G/DL
ALP SERPL-CCNC: 49 U/L (ref 39–117)
ALT SERPL W P-5'-P-CCNC: 13 U/L (ref 1–33)
ANION GAP SERPL CALCULATED.3IONS-SCNC: 9 MMOL/L (ref 5–15)
AST SERPL-CCNC: 18 U/L (ref 1–32)
BACTERIA UR QL AUTO: ABNORMAL /HPF
BASOPHILS # BLD AUTO: 0.03 10*3/MM3 (ref 0–0.2)
BASOPHILS NFR BLD AUTO: 0.4 % (ref 0–1.5)
BILIRUB SERPL-MCNC: 0.9 MG/DL (ref 0–1.2)
BILIRUB UR QL STRIP: NEGATIVE
BUN SERPL-MCNC: 10 MG/DL (ref 8–23)
BUN/CREAT SERPL: 12.5 (ref 7–25)
CALCIUM SPEC-SCNC: 9.1 MG/DL (ref 8.6–10.5)
CHLORIDE SERPL-SCNC: 100 MMOL/L (ref 98–107)
CLARITY UR: CLEAR
CO2 SERPL-SCNC: 27 MMOL/L (ref 22–29)
COLOR UR: ABNORMAL
CREAT SERPL-MCNC: 0.8 MG/DL (ref 0.57–1)
DEPRECATED RDW RBC AUTO: 49.4 FL (ref 37–54)
EGFRCR SERPLBLD CKD-EPI 2021: 74.6 ML/MIN/1.73
EOSINOPHIL # BLD AUTO: 0.01 10*3/MM3 (ref 0–0.4)
EOSINOPHIL NFR BLD AUTO: 0.1 % (ref 0.3–6.2)
ERYTHROCYTE [DISTWIDTH] IN BLOOD BY AUTOMATED COUNT: 14.6 % (ref 12.3–15.4)
FLUAV SUBTYP SPEC NAA+PROBE: NOT DETECTED
FLUBV RNA ISLT QL NAA+PROBE: NOT DETECTED
GLOBULIN UR ELPH-MCNC: 3.5 GM/DL
GLUCOSE SERPL-MCNC: 116 MG/DL (ref 65–99)
GLUCOSE UR STRIP-MCNC: NEGATIVE MG/DL
HCT VFR BLD AUTO: 40.8 % (ref 34–46.6)
HGB BLD-MCNC: 13.2 G/DL (ref 12–15.9)
HGB UR QL STRIP.AUTO: NEGATIVE
HOLD SPECIMEN: NORMAL
HOLD SPECIMEN: NORMAL
HYALINE CASTS UR QL AUTO: ABNORMAL /LPF
IMM GRANULOCYTES # BLD AUTO: 0.03 10*3/MM3 (ref 0–0.05)
IMM GRANULOCYTES NFR BLD AUTO: 0.4 % (ref 0–0.5)
KETONES UR QL STRIP: ABNORMAL
LEUKOCYTE ESTERASE UR QL STRIP.AUTO: ABNORMAL
LYMPHOCYTES # BLD AUTO: 0.73 10*3/MM3 (ref 0.7–3.1)
LYMPHOCYTES NFR BLD AUTO: 9.7 % (ref 19.6–45.3)
MAGNESIUM SERPL-MCNC: 2.1 MG/DL (ref 1.6–2.4)
MCH RBC QN AUTO: 29.6 PG (ref 26.6–33)
MCHC RBC AUTO-ENTMCNC: 32.4 G/DL (ref 31.5–35.7)
MCV RBC AUTO: 91.5 FL (ref 79–97)
MONOCYTES # BLD AUTO: 1.01 10*3/MM3 (ref 0.1–0.9)
MONOCYTES NFR BLD AUTO: 13.4 % (ref 5–12)
NEUTROPHILS NFR BLD AUTO: 5.71 10*3/MM3 (ref 1.7–7)
NEUTROPHILS NFR BLD AUTO: 76 % (ref 42.7–76)
NITRITE UR QL STRIP: NEGATIVE
NRBC BLD AUTO-RTO: 0 /100 WBC (ref 0–0.2)
PH UR STRIP.AUTO: 6 [PH] (ref 5–8)
PLATELET # BLD AUTO: 218 10*3/MM3 (ref 140–450)
PMV BLD AUTO: 9.7 FL (ref 6–12)
POTASSIUM SERPL-SCNC: 3.9 MMOL/L (ref 3.5–5.2)
PROT SERPL-MCNC: 7.2 G/DL (ref 6–8.5)
PROT UR QL STRIP: ABNORMAL
QT INTERVAL: 403 MS
QTC INTERVAL: 438 MS
RBC # BLD AUTO: 4.46 10*6/MM3 (ref 3.77–5.28)
RBC # UR STRIP: ABNORMAL /HPF
REF LAB TEST METHOD: ABNORMAL
RSV RNA NPH QL NAA+NON-PROBE: NOT DETECTED
SARS-COV-2 RNA RESP QL NAA+PROBE: DETECTED
SODIUM SERPL-SCNC: 136 MMOL/L (ref 136–145)
SP GR UR STRIP: 1.02 (ref 1–1.03)
SQUAMOUS #/AREA URNS HPF: ABNORMAL /HPF
TROPONIN T SERPL HS-MCNC: 23 NG/L
UROBILINOGEN UR QL STRIP: ABNORMAL
WBC # UR STRIP: ABNORMAL /HPF
WBC NRBC COR # BLD: 7.52 10*3/MM3 (ref 3.4–10.8)
WHOLE BLOOD HOLD COAG: NORMAL
WHOLE BLOOD HOLD SPECIMEN: NORMAL

## 2023-11-02 PROCEDURE — 71045 X-RAY EXAM CHEST 1 VIEW: CPT

## 2023-11-02 PROCEDURE — 87637 SARSCOV2&INF A&B&RSV AMP PRB: CPT

## 2023-11-02 PROCEDURE — 83735 ASSAY OF MAGNESIUM: CPT

## 2023-11-02 PROCEDURE — 81001 URINALYSIS AUTO W/SCOPE: CPT | Performed by: EMERGENCY MEDICINE

## 2023-11-02 PROCEDURE — 84484 ASSAY OF TROPONIN QUANT: CPT

## 2023-11-02 PROCEDURE — 80053 COMPREHEN METABOLIC PANEL: CPT

## 2023-11-02 PROCEDURE — 99285 EMERGENCY DEPT VISIT HI MDM: CPT

## 2023-11-02 PROCEDURE — 93005 ELECTROCARDIOGRAM TRACING: CPT | Performed by: EMERGENCY MEDICINE

## 2023-11-02 PROCEDURE — 99223 1ST HOSP IP/OBS HIGH 75: CPT | Performed by: FAMILY MEDICINE

## 2023-11-02 PROCEDURE — 93005 ELECTROCARDIOGRAM TRACING: CPT

## 2023-11-02 PROCEDURE — 85025 COMPLETE CBC W/AUTO DIFF WBC: CPT

## 2023-11-02 RX ORDER — SODIUM CHLORIDE 0.9 % (FLUSH) 0.9 %
10 SYRINGE (ML) INJECTION AS NEEDED
Status: DISCONTINUED | OUTPATIENT
Start: 2023-11-02 | End: 2023-11-04 | Stop reason: HOSPADM

## 2023-11-03 ENCOUNTER — APPOINTMENT (OUTPATIENT)
Dept: CARDIOLOGY | Facility: HOSPITAL | Age: 78
DRG: 177 | End: 2023-11-03
Payer: MEDICARE

## 2023-11-03 LAB
ALBUMIN SERPL-MCNC: 3.5 G/DL (ref 3.5–5.2)
ALBUMIN/GLOB SERPL: 0.9 G/DL
ALP SERPL-CCNC: 48 U/L (ref 39–117)
ALT SERPL W P-5'-P-CCNC: 13 U/L (ref 1–33)
ANION GAP SERPL CALCULATED.3IONS-SCNC: 9.1 MMOL/L (ref 5–15)
AST SERPL-CCNC: 17 U/L (ref 1–32)
BASOPHILS # BLD AUTO: 0.04 10*3/MM3 (ref 0–0.2)
BASOPHILS NFR BLD AUTO: 0.6 % (ref 0–1.5)
BILIRUB SERPL-MCNC: 0.9 MG/DL (ref 0–1.2)
BUN SERPL-MCNC: 14 MG/DL (ref 8–23)
BUN/CREAT SERPL: 16.3 (ref 7–25)
CALCIUM SPEC-SCNC: 9 MG/DL (ref 8.6–10.5)
CHLORIDE SERPL-SCNC: 101 MMOL/L (ref 98–107)
CO2 SERPL-SCNC: 24.9 MMOL/L (ref 22–29)
CREAT SERPL-MCNC: 0.86 MG/DL (ref 0.57–1)
DEPRECATED RDW RBC AUTO: 49 FL (ref 37–54)
EGFRCR SERPLBLD CKD-EPI 2021: 68.4 ML/MIN/1.73
EOSINOPHIL # BLD AUTO: 0.01 10*3/MM3 (ref 0–0.4)
EOSINOPHIL NFR BLD AUTO: 0.2 % (ref 0.3–6.2)
ERYTHROCYTE [DISTWIDTH] IN BLOOD BY AUTOMATED COUNT: 14.6 % (ref 12.3–15.4)
GLOBULIN UR ELPH-MCNC: 3.7 GM/DL
GLUCOSE SERPL-MCNC: 137 MG/DL (ref 65–99)
HCT VFR BLD AUTO: 39.8 % (ref 34–46.6)
HGB BLD-MCNC: 13 G/DL (ref 12–15.9)
IMM GRANULOCYTES # BLD AUTO: 0.03 10*3/MM3 (ref 0–0.05)
IMM GRANULOCYTES NFR BLD AUTO: 0.5 % (ref 0–0.5)
LYMPHOCYTES # BLD AUTO: 0.86 10*3/MM3 (ref 0.7–3.1)
LYMPHOCYTES NFR BLD AUTO: 13.4 % (ref 19.6–45.3)
MAGNESIUM SERPL-MCNC: 2.1 MG/DL (ref 1.6–2.4)
MCH RBC QN AUTO: 29.6 PG (ref 26.6–33)
MCHC RBC AUTO-ENTMCNC: 32.7 G/DL (ref 31.5–35.7)
MCV RBC AUTO: 90.7 FL (ref 79–97)
MONOCYTES # BLD AUTO: 1.04 10*3/MM3 (ref 0.1–0.9)
MONOCYTES NFR BLD AUTO: 16.2 % (ref 5–12)
NEUTROPHILS NFR BLD AUTO: 4.43 10*3/MM3 (ref 1.7–7)
NEUTROPHILS NFR BLD AUTO: 69.1 % (ref 42.7–76)
NRBC BLD AUTO-RTO: 0 /100 WBC (ref 0–0.2)
PLATELET # BLD AUTO: 218 10*3/MM3 (ref 140–450)
PMV BLD AUTO: 9.8 FL (ref 6–12)
POTASSIUM SERPL-SCNC: 3.3 MMOL/L (ref 3.5–5.2)
PROCALCITONIN SERPL-MCNC: 0.08 NG/ML (ref 0–0.25)
PROT SERPL-MCNC: 7.2 G/DL (ref 6–8.5)
QTC INTERVAL: NORMAL MS
RBC # BLD AUTO: 4.39 10*6/MM3 (ref 3.77–5.28)
SODIUM SERPL-SCNC: 135 MMOL/L (ref 136–145)
TSH SERPL DL<=0.05 MIU/L-ACNC: 1.63 UIU/ML (ref 0.27–4.2)
WBC NRBC COR # BLD: 6.41 10*3/MM3 (ref 3.4–10.8)

## 2023-11-03 PROCEDURE — 94761 N-INVAS EAR/PLS OXIMETRY MLT: CPT

## 2023-11-03 PROCEDURE — 83735 ASSAY OF MAGNESIUM: CPT | Performed by: FAMILY MEDICINE

## 2023-11-03 PROCEDURE — 85025 COMPLETE CBC W/AUTO DIFF WBC: CPT | Performed by: FAMILY MEDICINE

## 2023-11-03 PROCEDURE — 99233 SBSQ HOSP IP/OBS HIGH 50: CPT | Performed by: INTERNAL MEDICINE

## 2023-11-03 PROCEDURE — 93005 ELECTROCARDIOGRAM TRACING: CPT | Performed by: INTERNAL MEDICINE

## 2023-11-03 PROCEDURE — 25010000002 HEPARIN (PORCINE) PER 1000 UNITS: Performed by: FAMILY MEDICINE

## 2023-11-03 PROCEDURE — 93306 TTE W/DOPPLER COMPLETE: CPT

## 2023-11-03 PROCEDURE — 25010000002 ENOXAPARIN PER 10 MG: Performed by: INTERNAL MEDICINE

## 2023-11-03 PROCEDURE — 84145 PROCALCITONIN (PCT): CPT | Performed by: FAMILY MEDICINE

## 2023-11-03 PROCEDURE — 94799 UNLISTED PULMONARY SVC/PX: CPT

## 2023-11-03 PROCEDURE — 84443 ASSAY THYROID STIM HORMONE: CPT | Performed by: INTERNAL MEDICINE

## 2023-11-03 PROCEDURE — 80053 COMPREHEN METABOLIC PANEL: CPT | Performed by: FAMILY MEDICINE

## 2023-11-03 PROCEDURE — 63710000001 DEXAMETHASONE PER 0.25 MG: Performed by: FAMILY MEDICINE

## 2023-11-03 RX ORDER — SODIUM CHLORIDE 0.9 % (FLUSH) 0.9 %
10 SYRINGE (ML) INJECTION EVERY 12 HOURS SCHEDULED
Status: DISCONTINUED | OUTPATIENT
Start: 2023-11-03 | End: 2023-11-04 | Stop reason: HOSPADM

## 2023-11-03 RX ORDER — ATORVASTATIN CALCIUM 10 MG/1
10 TABLET, FILM COATED ORAL NIGHTLY
Status: DISCONTINUED | OUTPATIENT
Start: 2023-11-03 | End: 2023-11-03

## 2023-11-03 RX ORDER — POTASSIUM CHLORIDE 750 MG/1
40 CAPSULE, EXTENDED RELEASE ORAL EVERY 4 HOURS
Status: COMPLETED | OUTPATIENT
Start: 2023-11-03 | End: 2023-11-03

## 2023-11-03 RX ORDER — POLYETHYLENE GLYCOL 3350 17 G/17G
17 POWDER, FOR SOLUTION ORAL DAILY PRN
Status: DISCONTINUED | OUTPATIENT
Start: 2023-11-03 | End: 2023-11-04 | Stop reason: HOSPADM

## 2023-11-03 RX ORDER — HEPARIN SODIUM 5000 [USP'U]/ML
5000 INJECTION, SOLUTION INTRAVENOUS; SUBCUTANEOUS EVERY 8 HOURS SCHEDULED
Status: DISCONTINUED | OUTPATIENT
Start: 2023-11-03 | End: 2023-11-03 | Stop reason: SDUPTHER

## 2023-11-03 RX ORDER — BISACODYL 5 MG/1
5 TABLET, DELAYED RELEASE ORAL DAILY PRN
Status: DISCONTINUED | OUTPATIENT
Start: 2023-11-03 | End: 2023-11-04 | Stop reason: HOSPADM

## 2023-11-03 RX ORDER — AMOXICILLIN 250 MG
2 CAPSULE ORAL 2 TIMES DAILY
Status: DISCONTINUED | OUTPATIENT
Start: 2023-11-03 | End: 2023-11-04 | Stop reason: HOSPADM

## 2023-11-03 RX ORDER — NITROGLYCERIN 0.4 MG/1
0.4 TABLET SUBLINGUAL
Status: DISCONTINUED | OUTPATIENT
Start: 2023-11-03 | End: 2023-11-04 | Stop reason: HOSPADM

## 2023-11-03 RX ORDER — ESCITALOPRAM OXALATE 10 MG/1
5 TABLET ORAL DAILY
Status: DISCONTINUED | OUTPATIENT
Start: 2023-11-03 | End: 2023-11-04 | Stop reason: HOSPADM

## 2023-11-03 RX ORDER — SODIUM CHLORIDE 9 MG/ML
40 INJECTION, SOLUTION INTRAVENOUS AS NEEDED
Status: DISCONTINUED | OUTPATIENT
Start: 2023-11-03 | End: 2023-11-04 | Stop reason: HOSPADM

## 2023-11-03 RX ORDER — ENOXAPARIN SODIUM 100 MG/ML
1 INJECTION SUBCUTANEOUS EVERY 12 HOURS SCHEDULED
Status: DISCONTINUED | OUTPATIENT
Start: 2023-11-03 | End: 2023-11-04 | Stop reason: HOSPADM

## 2023-11-03 RX ORDER — ACETAMINOPHEN 325 MG/1
650 TABLET ORAL EVERY 4 HOURS PRN
Status: DISCONTINUED | OUTPATIENT
Start: 2023-11-03 | End: 2023-11-04 | Stop reason: HOSPADM

## 2023-11-03 RX ORDER — HEPARIN SODIUM 5000 [USP'U]/ML
5000 INJECTION, SOLUTION INTRAVENOUS; SUBCUTANEOUS EVERY 8 HOURS SCHEDULED
Status: DISCONTINUED | OUTPATIENT
Start: 2023-11-03 | End: 2023-11-03

## 2023-11-03 RX ORDER — METOPROLOL SUCCINATE 25 MG/1
25 TABLET, EXTENDED RELEASE ORAL DAILY
Status: DISCONTINUED | OUTPATIENT
Start: 2023-11-03 | End: 2023-11-04 | Stop reason: HOSPADM

## 2023-11-03 RX ORDER — DEXAMETHASONE SODIUM PHOSPHATE 10 MG/ML
6 INJECTION, SOLUTION INTRAMUSCULAR; INTRAVENOUS DAILY
Status: DISCONTINUED | OUTPATIENT
Start: 2023-11-03 | End: 2023-11-03

## 2023-11-03 RX ORDER — LEVOTHYROXINE SODIUM 0.1 MG/1
100 TABLET ORAL EVERY MORNING
Status: DISCONTINUED | OUTPATIENT
Start: 2023-11-03 | End: 2023-11-04 | Stop reason: HOSPADM

## 2023-11-03 RX ORDER — BISACODYL 10 MG
10 SUPPOSITORY, RECTAL RECTAL DAILY PRN
Status: DISCONTINUED | OUTPATIENT
Start: 2023-11-03 | End: 2023-11-04 | Stop reason: HOSPADM

## 2023-11-03 RX ORDER — SODIUM CHLORIDE 0.9 % (FLUSH) 0.9 %
10 SYRINGE (ML) INJECTION AS NEEDED
Status: DISCONTINUED | OUTPATIENT
Start: 2023-11-03 | End: 2023-11-04 | Stop reason: HOSPADM

## 2023-11-03 RX ADMIN — POTASSIUM CHLORIDE 40 MEQ: 10 CAPSULE, COATED, EXTENDED RELEASE ORAL at 12:34

## 2023-11-03 RX ADMIN — ACETAMINOPHEN 650 MG: 325 TABLET ORAL at 10:19

## 2023-11-03 RX ADMIN — METOPROLOL SUCCINATE 25 MG: 25 TABLET, EXTENDED RELEASE ORAL at 10:15

## 2023-11-03 RX ADMIN — LEVOTHYROXINE SODIUM 100 MCG: 0.1 TABLET ORAL at 10:16

## 2023-11-03 RX ADMIN — DOCUSATE SODIUM 50MG AND SENNOSIDES 8.6MG 2 TABLET: 8.6; 5 TABLET, FILM COATED ORAL at 21:58

## 2023-11-03 RX ADMIN — DEXAMETHASONE 6 MG: 0.5 TABLET ORAL at 10:15

## 2023-11-03 RX ADMIN — ENOXAPARIN SODIUM 80 MG: 100 INJECTION SUBCUTANEOUS at 21:58

## 2023-11-03 RX ADMIN — Medication 10 ML: at 10:16

## 2023-11-03 RX ADMIN — Medication 10 ML: at 20:00

## 2023-11-03 RX ADMIN — NIRMATRELVIR AND RITONAVIR 3 TABLET: KIT at 12:35

## 2023-11-03 RX ADMIN — HEPARIN SODIUM 5000 UNITS: 5000 INJECTION INTRAVENOUS; SUBCUTANEOUS at 05:35

## 2023-11-03 RX ADMIN — ACETAMINOPHEN 650 MG: 325 TABLET ORAL at 01:21

## 2023-11-03 RX ADMIN — Medication 10 ML: at 01:57

## 2023-11-03 RX ADMIN — NIRMATRELVIR AND RITONAVIR 3 TABLET: KIT at 21:58

## 2023-11-03 RX ADMIN — ENOXAPARIN SODIUM 80 MG: 100 INJECTION SUBCUTANEOUS at 12:34

## 2023-11-03 RX ADMIN — POTASSIUM CHLORIDE 40 MEQ: 10 CAPSULE, COATED, EXTENDED RELEASE ORAL at 10:15

## 2023-11-03 NOTE — PLAN OF CARE
Problem: Adult Inpatient Plan of Care  Goal: Plan of Care Review  Outcome: Ongoing, Progressing  Flowsheets (Taken 11/3/2023 1724)  Plan of Care Reviewed With: patient  Goal: Patient-Specific Goal (Individualized)  Outcome: Ongoing, Progressing  Goal: Absence of Hospital-Acquired Illness or Injury  Outcome: Ongoing, Progressing  Intervention: Identify and Manage Fall Risk  Recent Flowsheet Documentation  Taken 11/3/2023 1700 by Nuha Price RN  Safety Promotion/Fall Prevention: safety round/check completed  Taken 11/3/2023 1500 by Nuha Price RN  Safety Promotion/Fall Prevention: safety round/check completed  Taken 11/3/2023 1321 by Nuha Price RN  Safety Promotion/Fall Prevention:   nonskid shoes/slippers when out of bed   safety round/check completed  Intervention: Prevent Infection  Recent Flowsheet Documentation  Taken 11/3/2023 1700 by Nuha Price RN  Infection Prevention:   hand hygiene promoted   single patient room provided  Taken 11/3/2023 1500 by Nuha Price RN  Infection Prevention:   hand hygiene promoted   single patient room provided  Taken 11/3/2023 1321 by Nuha Price RN  Infection Prevention:   hand hygiene promoted   single patient room provided  Goal: Optimal Comfort and Wellbeing  Outcome: Ongoing, Progressing  Goal: Readiness for Transition of Care  Outcome: Ongoing, Progressing     Problem: Hypertension Comorbidity  Goal: Blood Pressure in Desired Range  Outcome: Ongoing, Progressing   Goal Outcome Evaluation:  Plan of Care Reviewed With: patient            Pt is alert and orient x4.  VS Wnl for pt.  Possible discharge tomorrow.

## 2023-11-03 NOTE — PROGRESS NOTES
[facility]   Hospitalist Progress Note  Date: 11/3/2023  Patient Name: Kelsey Durand  : 1945  MRN: [id number]  Date of admission: 2023    Subjective   Subjective     Chief Complaint: Shortness of breath    Summary:   Kelsey Durand is a 78 y.o. female with essential hypertension, hypothyroidism, vitamin D deficiency, hyperlipidemia and chronic fatigue that presented to the ED with complaints of shortness of breath and generalized fatigue.  Patient was found to be COVID-19 positive.  The hospital service contact for further evaluation and management.  Patient started on Paxlovid.    Interval Followup:   No acute events overnight.  Patient states she is feeling little better today than she was yesterday.  She denies any active chest pain.  Still with some shortness of breath and cough.  Nursing with no additional acute issues to report.  Telemetry was reviewed and patient noted to have paroxysmal A-fib/flutter which patient does not have previous diagnosis for.    Review of Systems   All systems reviewed and negative unless stated otherwise under subjective.    Objective   Objective     Vitals:   Temp:  [98.1 °F (36.7 °C)-99.8 °F (37.7 °C)] 98.3 °F (36.8 °C)  Heart Rate:  [] 95  Resp:  [17-20] 18  BP: (118-145)/(68-83) 132/71  Physical Exam   Gen: No acute distress, elderly female, conversant, Pleasant, sitting up in bed  Resp: CTAB, No w/r/r, No respiratory distress appreciated  Card: RRR, No m/r/g  Abd: Soft, Nontender, Nondistended, + bowel sounds    Result Review    Result Review:  I have personally reviewed the results as below and agree with these findings:  []  Laboratory:   CMP          2023    13:07 2023    19:10 11/3/2023    03:07   CMP   Glucose  116  137    BUN  10  14    Creatinine  0.80  0.86    EGFR  74.6  68.4    Sodium  136  135    Potassium  3.9  3.3    Chloride  100  101    Calcium  9.1  9.0    Total Protein 6.7      Total Protein  7.2  7.2    Albumin 3.2   3.7  3.5    Globulin 3.5      Globulin  3.5  3.7    Total Bilirubin  0.9  0.9    Alkaline Phosphatase  49  48    AST (SGOT)  18  17    ALT (SGPT)  13  13    Albumin/Globulin Ratio  1.1  0.9    BUN/Creatinine Ratio  12.5  16.3    Anion Gap  9.0  9.1      CBC          9/7/2023    02:20 11/2/2023    19:10 11/3/2023    03:07   CBC   WBC 9.31  7.52  6.41    RBC 4.53  4.46  4.39    Hemoglobin 13.7  13.2  13.0    Hematocrit 41.1  40.8  39.8    MCV 90.7  91.5  90.7    MCH 30.2  29.6  29.6    MCHC 33.3  32.4  32.7    RDW 14.8  14.6  14.6    Platelets 228  218  218    Magnesium within normal limits.  Pro-Estevan: 0.08.  TSH: 1.63  []  Microbiology:   []  Radiology:   [x]  EKG/Telemetry:    []  Cardiology/Vascular:    []  Pathology:  []  Old records:  []  Other:    Assessment & Plan   Assessment / Plan     Assessment:  COVID-19 infection  Paroxysmal atrial fibrillation/flutter, new diagnosis  Shortness of breath secondary to COVID-19 pneumonia  Hypokalemia, new  Hypothyroidism  Hyperlipidemia  Essential hypertension    Plan:  -Discontinue dexamethasone.  Patient on room air not requiring supplemental O2.  -Plan to treat patient with Paxlovid x5 days  -Telemetry showed patient to have paroxysmal atrial fibrillation/flutter which is a new diagnosis for the patient.  Therapeutic Lovenox started since patient not able to be on apixaban while on Paxlovid.  Long-term anticoagulation was discussed with the patient who stated before starting full anticoagulation after discharge she will need to meet and discuss this with her PCP.  Therefore, at time of discharge anticoagulation will not be prescribed as this patient stated she wished to discuss with her PCP prior to initiation.  Risk associated with anticoagulation use was discussed with the patient in detail.  -Echocardiogram ordered  -Start appropriate home medications  -Replace potassium  -Walk test ordered to eval for home O2   -Will monitor electrolytes and renal function with BMP  and magnesium level in the AM  -Will monitor WBC and Hgb with CBC in the AM  -Clinical course will dictate further management     DVT Prophylaxis: Lovenox  Diet: Regular  Dispo: Home when medically appropriate for discharge  Code Status: Full code     Time spent personally reviewing patient's chart, labs and imaging, evaluating/examining the patient, discussing care plan with patient and nurse at bedside: 52 minutes.     Part of this note may be an electronic transcription/translation of spoken language to printed text using the Dragon dictation system.    DVT prophylaxis:  Medical DVT prophylaxis orders are present.    CODE STATUS:   Code Status (Patient has no pulse and is not breathing): CPR (Attempt to Resuscitate)  Medical Interventions (Patient has pulse or is breathing): Full Support      Electronically signed by Emiliano Amaral MD, 11/03/23, 1:43 PM EDT.

## 2023-11-03 NOTE — PLAN OF CARE
Problem: Adult Inpatient Plan of Care  Goal: Plan of Care Review  Outcome: Ongoing, Progressing  Flowsheets (Taken 11/3/2023 2782)  Progress: no change  Plan of Care Reviewed With: patient  Outcome Evaluation: Patient arrived throughout shift. Alert and oriented throughout shift. VSS. Continue with plan of care.   Goal Outcome Evaluation:  Plan of Care Reviewed With: patient        Progress: no change  Outcome Evaluation: Patient arrived throughout shift. Alert and oriented throughout shift. VSS. Continue with plan of care.

## 2023-11-03 NOTE — H&P
[facility]   HISTORY AND PHYSICAL    Patient Name: Kelsey Durand  : 1945  MRN: [id number]  Primary Care Physician:  Chris Elder MD  Date of admission: 2023    Subjective   Subjective     Chief Complaint: Shortness of breath    History of Present Illness  Patient is an 78-year-old female with past medical history of hyperlipidemia, hypertension, hypothyroidism, vitamin D deficiency, chronic fatigue but no pulmonary issues.  She presents to the emergency department today with shortness of breath.  She was found to be quite hypoxic with an O2 saturation in the mid 80s.  Patient was COVID 19 positive.  I spoke with the emergency room physician about potentially starting Paxlovid and admitting for further evaluation and treatment.  Clinical pharmacy looked over the patient's vital signs and said that she did indeed qualify for Paxlovid treatment.  Review of Systems   General-weakness  Respiratory-shortness of breath  All other systems were reviewed and subsequently negative  Personal History     Past Medical History:   Diagnosis Date    Allergic rhinitis due to pollen     Atrophy of thyroid (acquired)     Chronic fatigue     Colitis     Difficulty walking     Disease of thyroid gland     DM (diabetes mellitus)     Fracture, foot 2022    Fell in yard Mon morning. Sorta loss balance. Right foot on side    Gallstones     Hypercholesterolemia     Hyperlipidemia     Hypertension     Hypothyroid     Impaired fasting glucose     Mixed hyperlipidemia     Syncope ?    Thyroid nodule     Unspecified osteoarthritis, unspecified site     Vitamin D deficiency, unspecified        Past Surgical History:   Procedure Laterality Date    CATARACT EXTRACTION      2011 Left Cataract Surgery 2011 right eye cataract surgery    COLONOSCOPY      2020 colonoscopy    LEEP      OTHER SURGICAL HISTORY      FNA       Family History: family history is not on file. Otherwise pertinent FHx was  reviewed and not pertinent to current issue.    Social History:  reports that she has never smoked. She has been exposed to tobacco smoke. She has never used smokeless tobacco. She reports that she does not currently use alcohol. She reports that she does not use drugs.    Home Medications:  acetaminophen, escitalopram, levothyroxine, metoprolol succinate XL, multivitamin with minerals, and simvastatin    Allergies:  Allergies   Allergen Reactions    Sulfa Antibiotics Rash and Hives       Objective    Objective     Vitals:   Temp:  [99.7 °F (37.6 °C)] 99.7 °F (37.6 °C)  Heart Rate:  [73-93] 76  Resp:  [17] 17  BP: (126-145)/(68-79) 126/68    Physical Exam  Constitutional:  Well-developed and well-nourished.  No acute distress.      HENT:  Head:  Normocephalic and atraumatic.  Mouth:  Moist mucous membranes.    Eyes:  Conjunctivae and EOM are normal. No scleral icterus.    Neck:  Neck supple.  No JVD present.    Cardiovascular:  Normal rate, regular rhythm and normal heart sounds with no murmur.  Pulmonary/Chest:  No respiratory distress, no wheezes, no crackles, with normal breath sounds and good air movement.  Abdominal:  Soft. No distension and no tenderness.   Musculoskeletal:  No tenderness, and no deformity.  No red or swollen joints anywhere.    Neurological:  Alert and oriented to person, place, and time.  No cranial nerve deficit.    Skin:  Skin is warm and dry. No rash noted. No pallor.   Peripheral vascular:  No clubbing, no cyanosis, no edema.  Psychiatric: Appropriate mood and affect  : Deferred  Result Review    Result Review:  I have personally reviewed the results from the time of this admission to 11/2/2023 23:51 EDT and agree with these findings:  [x]  Laboratory list / accordion  []  Microbiology  [x]  Radiology  []  EKG/Telemetry   []  Cardiology/Vascular   []  Pathology  []  Old records  []  Other:  Most notable findings include:       Assessment & Plan   Assessment / Plan     Brief Patient  Summary:  Kelsey Durand is a 78 y.o. female who presents to the emergency department with shortness of breath.  She is found to be COVID-19 positive without pneumonia.  We will admit her for acute hypoxic respiratory failure    Active Hospital Problems:  1.  Acute hypoxic respiratory failure  2.  COVID-19 infection  3.  Diabetes mellitus  4.  Hypertension    Plan:   Patient will be admitted to the hospitalist service  Patient will be started on the COVID-19 pathway  Patient did qualify for Paxlovid therapy and that will be started  We will start the patient on dexamethasone therapy as well  We will support with O2 via nasal cannula      DVT prophylaxis:  No DVT prophylaxis order currently exists.    CODE STATUS:       Admission Status:  I believe this patient meets inpatient status.    Anthony Elias, DO

## 2023-11-03 NOTE — ED PROVIDER NOTES
"Time: 10:34 PM EDT  Date of encounter:  11/2/2023  Independent Historian/Clinical History and Information was obtained by:   Patient and Family    History is limited by: N/A    Chief Complaint: Cough, congestion      History of Present Illness:  Patient is a 78 y.o. year old female who presents to the emergency department for evaluation of cough and congestion.  Patient states she developed \"cold\" starting yesterday or the day prior.  Complains of generalized weakness, fatigue.  Family states she was \"lethargic\" today.  No GI symptoms.  Afebrile.    HPI    Patient Care Team  Primary Care Provider: Chris Elder MD    Past Medical History:     Allergies   Allergen Reactions    Sulfa Antibiotics Rash and Hives     Past Medical History:   Diagnosis Date    Allergic rhinitis due to pollen     Atrophy of thyroid (acquired)     Chronic fatigue     Colitis     Difficulty walking 2018    Disease of thyroid gland     DM (diabetes mellitus)     Fracture, foot Apr 4 2022    Fell in yard Mon morning. Sorta loss balance. Right foot on side    Gallstones     Hypercholesterolemia     Hyperlipidemia     Hypertension     Hypothyroid     Impaired fasting glucose     Mixed hyperlipidemia     Syncope 2016?    Thyroid nodule     Unspecified osteoarthritis, unspecified site     Vitamin D deficiency, unspecified      Past Surgical History:   Procedure Laterality Date    CATARACT EXTRACTION      12/14/2011 Left Cataract Surgery 11/30/2011 right eye cataract surgery    COLONOSCOPY      11/2020 colonoscopy    LEEP  2000    OTHER SURGICAL HISTORY      FNA     Family History   Problem Relation Age of Onset    Colon cancer Neg Hx        Home Medications:  Prior to Admission medications    Medication Sig Start Date End Date Taking? Authorizing Provider   acetaminophen (TYLENOL) 500 MG tablet Take 1 tablet by mouth Every 6 (Six) Hours As Needed for Mild Pain.    Provider, MD Berta   dicyclomine (BENTYL) 10 MG capsule Take 1 capsule by " mouth 3 (Three) Times a Day As Needed for Abdominal Cramping. 9/7/23   Lonny Madden MD   escitalopram (LEXAPRO) 5 MG tablet Take 1 tablet by mouth Daily. 8/24/23   Chris Elder MD   fluticasone (FLONASE) 50 MCG/ACT nasal spray 2 sprays by Each Nare route As Needed.    ProviderBerta MD   levothyroxine (SYNTHROID, LEVOTHROID) 100 MCG tablet Take 1 tablet by mouth Every Morning. 8/24/23   Chris Elder MD   metoprolol succinate XL (TOPROL-XL) 25 MG 24 hr tablet Take 1 tablet by mouth Daily. 8/24/23   Chris Elder MD   multivitamin with minerals tablet tablet Take 1 tablet by mouth Daily.    Berta Madera MD   ondansetron ODT (ZOFRAN-ODT) 4 MG disintegrating tablet Place 1 tablet on the tongue 4 (Four) Times a Day As Needed for Nausea or Vomiting. 9/7/23   Lonny Madden MD   psyllium (METAMUCIL) 0.52 g capsule Take 1 capsule by mouth Daily.    ProviderBerta MD   simvastatin (ZOCOR) 10 MG tablet Take 1 tablet by mouth Every Other Day. 4/28/23   Chris Elder MD        Social History:   Social History     Tobacco Use    Smoking status: Never     Passive exposure: Yes    Smokeless tobacco: Never   Vaping Use    Vaping Use: Never used   Substance Use Topics    Alcohol use: Not Currently    Drug use: Never         Review of Systems:  Review of Systems   Constitutional:  Positive for fatigue. Negative for chills and fever.   HENT:  Positive for congestion. Negative for rhinorrhea and sore throat.    Eyes:  Negative for photophobia.   Respiratory:  Positive for cough. Negative for apnea, chest tightness and shortness of breath.    Cardiovascular:  Negative for chest pain and palpitations.   Gastrointestinal:  Negative for abdominal pain, diarrhea, nausea and vomiting.   Endocrine: Negative.    Genitourinary:  Negative for difficulty urinating and dysuria.   Musculoskeletal:  Negative for back pain, joint swelling and myalgias.   Skin:  Negative for color change and wound.  "  Allergic/Immunologic: Negative.    Neurological:  Positive for weakness. Negative for seizures and headaches.   Psychiatric/Behavioral: Negative.     All other systems reviewed and are negative.       Physical Exam:  /80 (BP Location: Right arm, Patient Position: Lying)   Pulse 78   Temp 99.8 °F (37.7 °C) (Oral)   Resp 18   Ht 157.5 cm (62.01\")   Wt 77.8 kg (171 lb 8.3 oz)   SpO2 92%   BMI 31.36 kg/m²     Physical Exam  Vitals and nursing note reviewed.   Constitutional:       General: She is awake.      Appearance: Normal appearance.   HENT:      Head: Normocephalic and atraumatic.      Nose: Nose normal.      Mouth/Throat:      Mouth: Mucous membranes are moist.   Eyes:      Extraocular Movements: Extraocular movements intact.      Pupils: Pupils are equal, round, and reactive to light.   Cardiovascular:      Rate and Rhythm: Normal rate and regular rhythm.      Heart sounds: Normal heart sounds.   Pulmonary:      Effort: Pulmonary effort is normal. No respiratory distress.      Breath sounds: No wheezing, rhonchi or rales.   Abdominal:      General: Bowel sounds are normal.      Palpations: Abdomen is soft.      Tenderness: There is no abdominal tenderness. There is no guarding or rebound.      Comments: No rigidity   Musculoskeletal:         General: No tenderness. Normal range of motion.      Cervical back: Normal range of motion and neck supple.   Skin:     General: Skin is warm and dry.      Coloration: Skin is not jaundiced.   Neurological:      General: No focal deficit present.      Mental Status: She is alert. Mental status is at baseline.   Psychiatric:         Mood and Affect: Mood normal.                  Procedures:  Procedures      Medical Decision Making:      Comorbidities that affect care:    Hypertension, hyperlipidemia, thyroid disease    External Notes reviewed:    Previous Clinic Note: 9/11/2023 office visit with internal medicine for hospital discharge follow-up, primary " hypertension      The following orders were placed and all results were independently analyzed by me:  Orders Placed This Encounter   Procedures    COVID PRE-OP / PRE-PROCEDURE SCREENING ORDER (NO ISOLATION) - Swab, Nasopharynx    COVID-19, FLU A/B, RSV PCR - Swab, Nasopharynx    XR Chest 1 View    [place] Draw    Comprehensive Metabolic Panel    Single High Sensitivity Troponin T    Magnesium    Urinalysis With Microscopic If Indicated (No Culture) - Urine, Clean Catch    CBC Auto Differential    Urinalysis, Microscopic Only - Urine, Clean Catch    Comprehensive Metabolic Panel    Magnesium    Procalcitonin    CBC Auto Differential    NPO Diet NPO Type: Strict NPO    Undress & Gown    Vital Signs    Orthostatic Blood Pressure    Notify Provider (Specify Parameters)    Oral Care    Telemetry - Maintain IV Access    Telemetry - Place Orders & Notify Provider of Results When Patient Experiences Acute Chest Pain, Dysrhythmia or Respiratory Distress    Pulse Oximetry, Continuous    Activity - Ad Nubia    Advance Diet As Tolerated -    Hospitalist (on-call MD unless specified)    Oxygen Therapy- Nasal Cannula; Titrate 1-6 LPM Per SpO2; 90 - 95%    Document Pulse Oximetry - On Room Air / Home O2 Level    Incentive Spirometry    Oxygen Therapy- Nasal Cannula; Titrate 1-6 LPM Per SpO2; 90 - 95%    POC Glucose Once    ECG 12 Lead ED Triage Standing Order; Weak / Dizzy / AMS    Insert Peripheral IV    Insert Peripheral IV    Inpatient Admission    Fall Precautions    CBC & Differential    Green Top (Gel)    Lavender Top    Gold Top - SST    Light Blue Top    CBC & Differential       Medications Given in the Emergency Department:  Medications   sodium chloride 0.9 % flush 10 mL (has no administration in time range)   sodium chloride 0.9 % flush 10 mL (10 mL Intravenous Given 11/3/23 0157)   sodium chloride 0.9 % flush 10 mL (has no administration in time range)   sodium chloride 0.9 % infusion 40 mL (has no administration in  time range)   sennosides-docusate (PERICOLACE) 8.6-50 MG per tablet 2 tablet (has no administration in time range)     And   polyethylene glycol (MIRALAX) packet 17 g (has no administration in time range)     And   bisacodyl (DULCOLAX) EC tablet 5 mg (has no administration in time range)     And   bisacodyl (DULCOLAX) suppository 10 mg (has no administration in time range)   heparin (porcine) 5000 UNIT/ML injection 5,000 Units (has no administration in time range)   nitroglycerin (NITROSTAT) SL tablet 0.4 mg (has no administration in time range)   dexAMETHasone (DECADRON) tablet 6 mg (has no administration in time range)     Or   dexAMETHasone sodium phosphate injection 6 mg (has no administration in time range)   Nirmatrelvir&Ritonavir 300/100 (PAXLOVID) 20 x 150 MG & 10 x 100MG tablet therapy pack 3 tablet (has no administration in time range)   acetaminophen (TYLENOL) tablet 650 mg (650 mg Oral Given 11/3/23 0121)        ED Course:    ED Course as of 11/03/23 0159   Thu Nov 02, 2023 2108 I have personally interpreted the EKG today and it shows no evidence of any acute ischemia or heart arrhythmia. [RP]      ED Course User Index  [RP] Mike Silver MD       Labs:    Lab Results (last 24 hours)       Procedure Component Value Units Date/Time    CBC & Differential [[id number]]  (Abnormal) Collected: 11/02/23 1910    Specimen: Blood Updated: 11/02/23 1922    Narrative:      The following orders were created for panel order CBC & Differential.  Procedure                               Abnormality         Status                     ---------                               -----------         ------                     CBC Auto Differential[[id number]]        Abnormal            Final result                 Please view results for these tests on the individual orders.    Comprehensive Metabolic Panel [[id number]]  (Abnormal) Collected: 11/02/23 1910    Specimen: Blood Updated: 11/02/23 1940     Glucose 116 mg/dL       BUN 10 mg/dL      Creatinine 0.80 mg/dL      Sodium 136 mmol/L      Potassium 3.9 mmol/L      Chloride 100 mmol/L      CO2 27.0 mmol/L      Calcium 9.1 mg/dL      Total Protein 7.2 g/dL      Albumin 3.7 g/dL      ALT (SGPT) 13 U/L      AST (SGOT) 18 U/L      Alkaline Phosphatase 49 U/L      Total Bilirubin 0.9 mg/dL      Globulin 3.5 gm/dL      A/G Ratio 1.1 g/dL      BUN/Creatinine Ratio 12.5     Anion Gap 9.0 mmol/L      eGFR 74.6 mL/min/1.73     Narrative:      GFR Normal >60  Chronic Kidney Disease <60  Kidney Failure <15    The GFR formula is only valid for adults with stable renal function between ages 18 and 70.    Single High Sensitivity Troponin T [[id number]]  (Abnormal) Collected: 11/02/23 1910    Specimen: Blood Updated: 11/02/23 1940     HS Troponin T 23 ng/L     Narrative:      High Sensitive Troponin T Reference Range:  <10.0 ng/L- Negative Female for AMI  <15.0 ng/L- Negative Male for AMI  >=10 - Abnormal Female indicating possible myocardial injury.  >=15 - Abnormal Male indicating possible myocardial injury.   Clinicians would have to utilize clinical acumen, EKG, Troponin, and serial changes to determine if it is an Acute Myocardial Infarction or myocardial injury due to an underlying chronic condition.         Magnesium [[id number]]  (Normal) Collected: 11/02/23 1910    Specimen: Blood Updated: 11/02/23 1940     Magnesium 2.1 mg/dL     CBC Auto Differential [[id number]]  (Abnormal) Collected: 11/02/23 1910    Specimen: Blood Updated: 11/02/23 1922     WBC 7.52 10*3/mm3      RBC 4.46 10*6/mm3      Hemoglobin 13.2 g/dL      Hematocrit 40.8 %      MCV 91.5 fL      MCH 29.6 pg      MCHC 32.4 g/dL      RDW 14.6 %      RDW-SD 49.4 fl      MPV 9.7 fL      Platelets 218 10*3/mm3      Neutrophil % 76.0 %      Lymphocyte % 9.7 %      Monocyte % 13.4 %      Eosinophil % 0.1 %      Basophil % 0.4 %      Immature Grans % 0.4 %      Neutrophils, Absolute 5.71 10*3/mm3      Lymphocytes, Absolute 0.73 10*3/mm3       Monocytes, Absolute 1.01 10*3/mm3      Eosinophils, Absolute 0.01 10*3/mm3      Basophils, Absolute 0.03 10*3/mm3      Immature Grans, Absolute 0.03 10*3/mm3      nRBC 0.0 /100 WBC     COVID PRE-OP / PRE-PROCEDURE SCREENING ORDER (NO ISOLATION) - Swab, Nasopharynx [[id number]]  (Abnormal) Collected: 11/02/23 1910    Specimen: Swab from Nasopharynx Updated: 11/02/23 2007    Narrative:      The following orders were created for panel order COVID PRE-OP / PRE-PROCEDURE SCREENING ORDER (NO ISOLATION) - Swab, Nasopharynx.  Procedure                               Abnormality         Status                     ---------                               -----------         ------                     COVID-19, FLU A/B, RSV P...[[id number]]  Abnormal            Final result                 Please view results for these tests on the individual orders.    COVID-19, FLU A/B, RSV PCR - Swab, Nasopharynx [[id number]]  (Abnormal) Collected: 11/02/23 1910    Specimen: Swab from Nasopharynx Updated: 11/02/23 2007     COVID19 Detected     Influenza A PCR Not Detected     Influenza B PCR Not Detected     RSV, PCR Not Detected    Narrative:      Fact sheet for providers: https://www.fda.gov/media/062462/download    Fact sheet for patients: https://www.fda.gov/media/505670/download    Test performed by PCR.    Urinalysis With Microscopic If Indicated (No Culture) - Urine, Clean Catch [[id number]]  (Abnormal) Collected: 11/02/23 2216    Specimen: Urine, Clean Catch Updated: 11/02/23 2249     Color, UA Dark Yellow     Appearance, UA Clear     pH, UA 6.0     Specific Gravity, UA 1.022     Glucose, UA Negative     Ketones, UA Trace     Bilirubin, UA Negative     Blood, UA Negative     Protein, UA Trace     Leuk Esterase, UA Trace     Nitrite, UA Negative     Urobilinogen, UA 1.0 E.U./dL    Urinalysis, Microscopic Only - Urine, Clean Catch [[id number]]  (Abnormal) Collected: 11/02/23 2216    Specimen: Urine, Clean Catch Updated: 11/02/23 2249      RBC, UA 3-5 /HPF      WBC, UA 0-2 /HPF      Bacteria, UA None Seen /HPF      Squamous Epithelial Cells, UA 0-2 /HPF      Hyaline Casts, UA 0-2 /LPF      Methodology Automated Microscopy             Imaging:    XR Chest 1 View    Result Date: 11/2/2023  PROCEDURE: XR CHEST 1 VW  COMPARISON: [facility], CR, CHEST AP/PA 1 VIEW, 6/22/2018, 23:15.  [facility], CR, CHEST AP/PA 1 VIEW, 1/02/2021, 17:18.  [facility], CR, XR ABDOMEN 2+ VIEWS W CHEST 1 VW, 10/08/2021, 12:23.  INDICATIONS: WEAKNESS/ TROUBLE BREATHING  FINDINGS:  The heart appears enlarged.  Mediastinal contour appears grossly normal.  No definite effusion or pneumothorax is seen.  No suspicious pulmonary infiltrate.  There are calcified left hilar lymph nodes, as before.        1. Cardiomegaly. 2. No radiographic evidence of acute pulmonary abnormality.       GABBY MOYA MD       Electronically Signed and Approved By: GABBY MOYA MD on 11/02/2023 at 19:44                Differential Diagnosis and Discussion:    Cough: Differential diagnosis includes but is not limited to pneumonia, acute bronchitis, upper respiratory infection, ACE inhibitor use, allergic reaction, epiglottitis, seasonal allergies, chemical irritants, exercise-induced asthma, viral syndrome.    All labs were reviewed and interpreted by me.  All X-rays impressions were independently interpreted by me.  EKG was interpreted by me.    MDM     Amount and/or Complexity of Data Reviewed  Clinical lab tests: reviewed  Tests in the radiology section of CPT®: reviewed  Tests in the medicine section of CPT®: reviewed             Patient Care Considerations:    CONSULT: I considered consulting pulmonology, however patient is stable for admission.      Consultants/Shared Management Plan:    Hospitalist: I have discussed the case with Dr. Elias who agrees to accept the patient for admission.    Social Determinants of Health:    Patient has presented with  family members who are responsible, reliable and will ensure follow up care.      Disposition and Care Coordination:    Admit:   Through independent evaluation of the patient's history, physical, and imperical data, the patient meets criteria for observation/admission to the hospital.        Final diagnoses:   COVID-19   Hypoxia        ED Disposition       ED Disposition   Decision to Admit    Condition   --    Comment   Level of Care: Telemetry [5]   Diagnosis: COVID-19 [[id number]]   Admitting Physician: PETER ROMAN [[id number]]   Certification: I Certify That Inpatient Hospital Services Are Medically Necessary For Greater Than 2 Midnights                 This medical record created using voice recognition software.             Mike Silver MD  11/03/23 0159

## 2023-11-04 ENCOUNTER — NURSE TRIAGE (OUTPATIENT)
Dept: CALL CENTER | Facility: HOSPITAL | Age: 78
End: 2023-11-04
Payer: MEDICARE

## 2023-11-04 ENCOUNTER — READMISSION MANAGEMENT (OUTPATIENT)
Dept: CALL CENTER | Facility: HOSPITAL | Age: 78
End: 2023-11-04
Payer: MEDICARE

## 2023-11-04 VITALS
BODY MASS INDEX: 31.56 KG/M2 | DIASTOLIC BLOOD PRESSURE: 73 MMHG | HEIGHT: 62 IN | HEART RATE: 63 BPM | RESPIRATION RATE: 18 BRPM | WEIGHT: 171.52 LBS | TEMPERATURE: 97.7 F | OXYGEN SATURATION: 92 % | SYSTOLIC BLOOD PRESSURE: 120 MMHG

## 2023-11-04 PROBLEM — D89.831 CYTOKINE RELEASE SYNDROME, GRADE 1: Status: ACTIVE | Noted: 2023-11-04

## 2023-11-04 LAB
ANION GAP SERPL CALCULATED.3IONS-SCNC: 8.9 MMOL/L (ref 5–15)
BH CV ECHO MEAS - AO MAX PG: 8 MMHG
BH CV ECHO MEAS - AO MEAN PG: 4 MMHG
BH CV ECHO MEAS - AO ROOT DIAM: 3 CM
BH CV ECHO MEAS - AO V2 MAX: 143 CM/SEC
BH CV ECHO MEAS - AO V2 VTI: 24.8 CM
BH CV ECHO MEAS - AVA(I,D): 2.5 CM2
BH CV ECHO MEAS - EDV(CUBED): 43.6 ML
BH CV ECHO MEAS - EDV(MOD-SP2): 26.7 ML
BH CV ECHO MEAS - EDV(MOD-SP4): 33.3 ML
BH CV ECHO MEAS - EF(MOD-SP2): 53.9 %
BH CV ECHO MEAS - EF(MOD-SP4): 58 %
BH CV ECHO MEAS - ESV(CUBED): 15.4 ML
BH CV ECHO MEAS - ESV(MOD-SP2): 12.3 ML
BH CV ECHO MEAS - ESV(MOD-SP4): 14 ML
BH CV ECHO MEAS - FS: 29.3 %
BH CV ECHO MEAS - IVS/LVPW: 0.82 CM
BH CV ECHO MEAS - IVSD: 0.89 CM
BH CV ECHO MEAS - LA DIMENSION: 3.7 CM
BH CV ECHO MEAS - LAT PEAK E' VEL: 7.6 CM/SEC
BH CV ECHO MEAS - LV DIASTOLIC VOL/BSA (35-75): 18.6 CM2
BH CV ECHO MEAS - LV MASS(C)D: 102.7 GRAMS
BH CV ECHO MEAS - LV MAX PG: 4.1 MMHG
BH CV ECHO MEAS - LV MEAN PG: 2 MMHG
BH CV ECHO MEAS - LV SYSTOLIC VOL/BSA (12-30): 7.8 CM2
BH CV ECHO MEAS - LV V1 MAX: 101 CM/SEC
BH CV ECHO MEAS - LV V1 VTI: 19.8 CM
BH CV ECHO MEAS - LVIDD: 3.5 CM
BH CV ECHO MEAS - LVIDS: 2.49 CM
BH CV ECHO MEAS - LVOT AREA: 3.1 CM2
BH CV ECHO MEAS - LVOT DIAM: 2 CM
BH CV ECHO MEAS - LVPWD: 1.09 CM
BH CV ECHO MEAS - MED PEAK E' VEL: 5 CM/SEC
BH CV ECHO MEAS - MV A MAX VEL: 78.8 CM/SEC
BH CV ECHO MEAS - MV DEC TIME: 0.28 SEC
BH CV ECHO MEAS - MV E MAX VEL: 50.6 CM/SEC
BH CV ECHO MEAS - MV E/A: 0.64
BH CV ECHO MEAS - RVDD: 2.5 CM
BH CV ECHO MEAS - SI(MOD-SP2): 8.1 ML/M2
BH CV ECHO MEAS - SI(MOD-SP4): 10.8 ML/M2
BH CV ECHO MEAS - SV(LVOT): 62.2 ML
BH CV ECHO MEAS - SV(MOD-SP2): 14.4 ML
BH CV ECHO MEAS - SV(MOD-SP4): 19.3 ML
BH CV ECHO MEAS - TAPSE (>1.6): 2.8 CM
BH CV ECHO MEASUREMENTS AVERAGE E/E' RATIO: 8.03
BUN SERPL-MCNC: 20 MG/DL (ref 8–23)
BUN/CREAT SERPL: 30.3 (ref 7–25)
CALCIUM SPEC-SCNC: 8.9 MG/DL (ref 8.6–10.5)
CHLORIDE SERPL-SCNC: 102 MMOL/L (ref 98–107)
CO2 SERPL-SCNC: 23.1 MMOL/L (ref 22–29)
CREAT SERPL-MCNC: 0.66 MG/DL (ref 0.57–1)
DEPRECATED RDW RBC AUTO: 48.5 FL (ref 37–54)
EGFRCR SERPLBLD CKD-EPI 2021: 88.8 ML/MIN/1.73
ERYTHROCYTE [DISTWIDTH] IN BLOOD BY AUTOMATED COUNT: 14.5 % (ref 12.3–15.4)
GLUCOSE SERPL-MCNC: 141 MG/DL (ref 65–99)
HCT VFR BLD AUTO: 43.9 % (ref 34–46.6)
HGB BLD-MCNC: 14 G/DL (ref 12–15.9)
IVRT: 55 MS
LEFT ATRIUM VOLUME INDEX: 16.6 ML/M2
LV EF 2D ECHO EST: 55 %
MAGNESIUM SERPL-MCNC: 2.2 MG/DL (ref 1.6–2.4)
MCH RBC QN AUTO: 29.2 PG (ref 26.6–33)
MCHC RBC AUTO-ENTMCNC: 31.9 G/DL (ref 31.5–35.7)
MCV RBC AUTO: 91.6 FL (ref 79–97)
PHOSPHATE SERPL-MCNC: 2.9 MG/DL (ref 2.5–4.5)
PLATELET # BLD AUTO: 245 10*3/MM3 (ref 140–450)
PMV BLD AUTO: 10.2 FL (ref 6–12)
POTASSIUM SERPL-SCNC: 4.3 MMOL/L (ref 3.5–5.2)
RBC # BLD AUTO: 4.79 10*6/MM3 (ref 3.77–5.28)
SODIUM SERPL-SCNC: 134 MMOL/L (ref 136–145)
WBC NRBC COR # BLD: 8.44 10*3/MM3 (ref 3.4–10.8)

## 2023-11-04 PROCEDURE — 25010000002 ENOXAPARIN PER 10 MG: Performed by: INTERNAL MEDICINE

## 2023-11-04 PROCEDURE — 99239 HOSP IP/OBS DSCHRG MGMT >30: CPT | Performed by: INTERNAL MEDICINE

## 2023-11-04 PROCEDURE — 94761 N-INVAS EAR/PLS OXIMETRY MLT: CPT

## 2023-11-04 PROCEDURE — 80048 BASIC METABOLIC PNL TOTAL CA: CPT | Performed by: INTERNAL MEDICINE

## 2023-11-04 PROCEDURE — 83735 ASSAY OF MAGNESIUM: CPT | Performed by: INTERNAL MEDICINE

## 2023-11-04 PROCEDURE — 94760 N-INVAS EAR/PLS OXIMETRY 1: CPT

## 2023-11-04 PROCEDURE — 94799 UNLISTED PULMONARY SVC/PX: CPT

## 2023-11-04 PROCEDURE — 84100 ASSAY OF PHOSPHORUS: CPT | Performed by: INTERNAL MEDICINE

## 2023-11-04 PROCEDURE — 85027 COMPLETE CBC AUTOMATED: CPT | Performed by: INTERNAL MEDICINE

## 2023-11-04 RX ADMIN — ENOXAPARIN SODIUM 80 MG: 100 INJECTION SUBCUTANEOUS at 08:43

## 2023-11-04 RX ADMIN — ESCITALOPRAM OXALATE 5 MG: 10 TABLET ORAL at 08:43

## 2023-11-04 RX ADMIN — LEVOTHYROXINE SODIUM 100 MCG: 0.1 TABLET ORAL at 06:23

## 2023-11-04 RX ADMIN — METOPROLOL SUCCINATE 25 MG: 25 TABLET, EXTENDED RELEASE ORAL at 08:43

## 2023-11-04 RX ADMIN — Medication 10 ML: at 08:44

## 2023-11-04 RX ADMIN — DOCUSATE SODIUM 50MG AND SENNOSIDES 8.6MG 2 TABLET: 8.6; 5 TABLET, FILM COATED ORAL at 08:43

## 2023-11-04 RX ADMIN — NIRMATRELVIR AND RITONAVIR 3 TABLET: KIT at 08:43

## 2023-11-04 NOTE — OUTREACH NOTE
Group Therapy Progress Notes       # of Group Members: 6  Time: 10:37-11:40      Topic: History of self-soothing and emotional regulation; exploring mental health stigmas and diagnoses; mindfulness Symptom Management, Personal Safety, Community Resources/Discharge Planning, Develop / Improve Independent Living Skills and Develop Socialization / Interpersonal Relationship Skills    Therapeutic Interventions/Treatment Strategies:  Support, Redirection, Feedback, Limit/Boundaries, Safety Assessments, Structured Activity, Problem Solving and Cognitive Behavioral Therapy   Response to Treatment Strategies:  Accepted Feedback, Gave Feedback, Listened, Focused on Goals, Attentive and Accepted Support    Name of Group:  Adolescent Group Therapy    Progress Note:  Today Pt explained her plan to stay home from the family trip to [place] because she feels that she is not ready to discharge and would like more time in treatment. This writer reiterated the need for her to have a plan for who she is staying with and have supports ready for emotional/SI/SIB situations that may arise over the week. Pt explained she will be staying with her aunt. Pt also explained some of her negative experiences she had during her childcare which she was wondering effected her ability to self-soothe and behaviors toward siblings. Pt explained that she had not revealed that information with anyone and it felt relieving to discuss it. Pt reported more severe SI yesterday while away at home for feeling ill. Pt met objective 1, 3, 4, 5      1) Carolina will daily utilize behavioral activation and decrease vegetative, isolating behaviors from 5x per week to 1-2x per week.      2) Carolina will continue to utilize and participate in healthy social activities outside of program and return to functional baseline of 2-3x per week from current 0-1.      3) Carolina will, 1 week prior to discharge began a transition to school and obtain an effective transition plan  Prep Survey      Flowsheet Row Responses   [facility] facility patient discharged from? [facility]   Is LACE score < 7 ? Yes   Eligibility [facility]   Date of Admission 11/02/23   Date of Discharge 11/04/23   Discharge Disposition Home or Self Care   Discharge diagnosis Covid 19   Does the patient have one of the following disease processes/diagnoses(primary or secondary)? Other   Does the patient have Home health ordered? No   Is there a DME ordered? No   Prep survey completed? Yes            COURTNEY ROBLERO - Registered Nurse           including any necessary and helpful accommodations.      4) Carolina will, a minimum of 1x per day identify emotions and any interventions used to maintain stability     5) Carolina will 1x per day share any suicidal ideation or self-injury urges and corresponding interventions to prevent those behaviors.               Is this a Weekly Review of the Progress on the Treatment Plan?  No

## 2023-11-04 NOTE — TELEPHONE ENCOUNTER
Taking Paxlovid for COVID had a diarrhea stool today  Asking if she can take Imodium with Paxlovid. Her daughter called the pharmacist and the pharmacist told her she can take the Imodium but she wanted to ask a nurse.  Recommended to follow advice of pharmacist.        Reason for Disposition   [1] Caller has medicine question about med NOT prescribed by PCP AND [2] triager unable to answer question (e.g., compatibility with other med, storage)    Additional Information   Negative: [1] Intentional drug overdose AND [2] suicidal thoughts or ideas   Negative: Drug overdose and triager unable to answer question   Negative: Caller requesting a renewal or refill of a medicine patient is currently taking   Negative: Caller requesting information unrelated to medicine   Negative: Caller requesting information about COVID-19 Vaccine   Negative: Caller requesting information about Emergency Contraception   Negative: Caller requesting information about Combined Birth Control Pills   Negative: Caller requesting information about Progestin Birth Control Pills   Negative: Caller requesting information about Post-Op pain or medicines   Negative: Caller requesting a prescription antibiotic (such as Penicillin) for Strep throat and has a positive culture result   Negative: Caller requesting a prescription anti-viral med (such as Tamiflu) and has influenza (flu) symptoms   Negative: Immunization reaction suspected   Negative: Rash while taking a medicine or within 3 days of stopping it   Negative: [1] Asthma and [2] having symptoms of asthma (cough, wheezing, etc.)   Negative: [1] Symptom of illness (e.g., headache, abdominal pain, earache, vomiting) AND [2] more than mild   Negative: Breastfeeding questions about mother's medicines and diet   Negative: MORE THAN A DOUBLE DOSE of a prescription or over-the-counter (OTC) drug   Negative: [1] DOUBLE DOSE (an extra dose or lesser amount) of prescription drug AND [2] any symptoms (e.g.,  "dizziness, nausea, pain, sleepiness)   Negative: [1] DOUBLE DOSE (an extra dose or lesser amount) of over-the-counter (OTC) drug AND [2] any symptoms (e.g., dizziness, nausea, pain, sleepiness)   Negative: Took another person's prescription drug   Negative: [1] DOUBLE DOSE (an extra dose or lesser amount) of prescription drug AND [2] NO symptoms  (Exception: A double dose of antibiotics.)   Negative: Diabetes drug error or overdose (e.g., took wrong type of insulin or took extra dose)   Negative: [1] Prescription not at pharmacy AND [2] was prescribed by PCP recently (Exception: Triager has access to EMR and prescription is recorded there. Go to Home Care and confirm for pharmacy.)   Negative: [1] Pharmacy calling with prescription question AND [2] triager unable to answer question   Negative: [1] Caller has URGENT medicine question about med that PCP or specialist prescribed AND [2] triager unable to answer question   Negative: Medicine patch causing local rash or itching    Answer Assessment - Initial Assessment Questions  1. NAME of MEDICINE: \"What medicine(s) are you calling about?\"  Immodium  2. QUESTION: \"What is your question?\" (e.g., double dose of medicine, side effect)      Asking if she can take Immodium with Paxlovid  3. PRESCRIBER: \"Who prescribed the medicine?\" Reason: if prescribed by specialist, call should be referred to that group.     OOTC  4. SYMPTOMS: \"Do you have any symptoms?\" If Yes, ask: \"What symptoms are you having?\"  \"How bad are the symptoms (e.g., mild, moderate, severe)      *No Answer*  5. PREGNANCY:  \"Is there any chance that you are pregnant?\" \"When was your last menstrual period?\"    Protocols used: Medication Question Call-ADULT-    "

## 2023-11-04 NOTE — PROCEDURES
Respiratory Therapist Walking Oximetry Progress Note      Patient Name:  Kelsey Durand  YOB: 1945  Date of Procedure: 11/04/23              ROOM AIR BASELINE   SpO2% 95   Heart Rate 81     EXERCISE ON ROOM AIR SpO2% EXERCISE ON O2 @ N/A LPM SpO2%   1 MINUTE 95 1 MINUTE    2 MINUTES 94 2 MINUTES    3 MINUTES 92 3 MINUTES    4 MINUTES 93 4 MINUTES    5 MINUTES 93 5 MINUTES    6 MINUTES 92 6 MINUTES               SpO2% Post Exercise  95%   HR Post Exercise  84   Time to Recovery  N/A     Comments: Patient had no complaints of shortness of breath before, during, or after walk test.            Electronically signed by Serenity Estrada, YOLANDE, 11/04/23, 9:42 AM EDT.

## 2023-11-04 NOTE — PLAN OF CARE
Goal Outcome Evaluation:  Plan of Care Reviewed With: patient           Outcome Evaluation: vss. no c/o tonight. up ad lorenzo with walker in room. no significant events. continue poc.

## 2023-11-04 NOTE — DISCHARGE SUMMARY
[facility]         HOSPITALIST  DISCHARGE SUMMARY    Patient Name: Kelsey Durand  : 1945  MRN: [id number]    Date of Admission: 2023  Date of Discharge:  23  Primary Care Physician: Chris Elder MD    Hospital Problems:  COVID-19 infection  Paroxysmal atrial fibrillation/flutter, new diagnosis  Shortness of breath secondary to COVID-19 pneumonia  Hypokalemia, new  Hypothyroidism  Hyperlipidemia  Essential hypertension    Hospital Course     Hospital Course:  Kelsey Durand is a 78 y.o. female with essential hypertension, hypothyroidism, vitamin D deficiency, hyperlipidemia and chronic fatigue that presented to the ED with complaints of shortness of breath and generalized fatigue.  Patient was found to be COVID-19 positive.  The hospital service contact for further evaluation and management.  Patient started on Paxlovid.  Telemetry review did show patient to have paroxysmal atrial fibrillation/flutter.  Patient was treated with therapeutic Lovenox during hospitalization, discussion was had with patient regarding anticoagulation at discharge and patient states she wishes to hold off on starting medication and wishes to discuss further with her PCP after discharge.  Of note, once patient's symptoms improved patient did not have any recurrent episodes of paroxysmal A-fib overnight prior to discharge.  Prior to discharge walk test was performed to evaluate patient's need for supplemental O2 and patient passed.  On day of discharge patient hemodynamically stable and no additional inpatient evaluation recommended at this time, she will discharge home with outpatient follow-up.    DISCHARGE Follow Up Recommendations for labs and diagnostics:   -Follow-up with PCP in 3 to 5 days    Day of Discharge     Vital Signs:  Temp:  [98 °F (36.7 °C)-98.6 °F (37 °C)] 98.6 °F (37 °C)  Heart Rate:  [64-86] 64  Resp:  [18] 18  BP: (108-140)/(62-79) 140/73  Physical Exam:   Gen: No acute  distress, elderly female, sitting up in chair bedside, pleasant, conversant  Resp: CTAB, No w/r/r, good aeration, equal chest rise bilaterally  Card: RRR, No m/r/g  Abd: Soft, Nontender, Nondistended, + bowel sounds     Discharge Details        Discharge Medications        New Medications        Instructions Start Date   Nirmatrelvir&Ritonavir 300/100 20 x 150 MG & 10 x 100MG tablet therapy pack tablet  Commonly known as: PAXLOVID   3 tablets, Oral, 2 Times Daily             Continue These Medications        Instructions Start Date   acetaminophen 500 MG tablet  Commonly known as: TYLENOL   500 mg, Oral, Every 6 Hours PRN      escitalopram 5 MG tablet  Commonly known as: LEXAPRO   5 mg, Oral, Daily      levothyroxine 100 MCG tablet  Commonly known as: SYNTHROID, LEVOTHROID   100 mcg, Oral, Every Morning      metoprolol succinate XL 25 MG 24 hr tablet  Commonly known as: TOPROL-XL   25 mg, Oral, Daily      multivitamin with minerals tablet tablet   1 tablet, Oral, Daily      simvastatin 10 MG tablet  Commonly known as: ZOCOR   10 mg, Oral, Every Other Day               Allergies   Allergen Reactions    Sulfa Antibiotics Rash and Hives       Discharge Disposition:  Home or Self Care    Diet:  Hospital:  Diet Order   Procedures    Diet: Regular/House Diet; Texture: Regular Texture (IDDSI 7); Fluid Consistency: Thin (IDDSI 0)       Discharge Activity:   Activity Instructions       Activity as Tolerated              CODE STATUS:  Code Status and Medical Interventions:   Ordered at: 11/03/23 0731     Code Status (Patient has no pulse and is not breathing):    CPR (Attempt to Resuscitate)     Medical Interventions (Patient has pulse or is breathing):    Full Support       Future Appointments   Date Time Provider Department Center   12/20/2023  9:15 AM Chris Elder MD [facility]       Additional Instructions for the Follow-ups that You Need to Schedule       Discharge Follow-up with PCP   As directed       Currently  Documented PCP:    Chris Elder MD    PCP Phone Number:    [phone]     Follow Up Details: Follow-up in 3-5 days                Pertinent  and/or Most Recent Results     RADIOLOGY:   XR Chest 1 View [[id number]] Chuy as Reviewed   Order Status: Completed Collected: 11/02/23 1944    Updated: 11/02/23 1948   Narrative:     PROCEDURE: XR CHEST 1 VW     COMPARISON: [facility], CR, CHEST AP/PA 1 VIEW, 6/22/2018, 23:15.  [facility], CR, CHEST AP/PA 1 VIEW, 1/02/2021, 17:18.  [facility], CR, XR ABDOMEN 2+  VIEWS W CHEST 1 VW, 10/08/2021, 12:23.     INDICATIONS: WEAKNESS/ TROUBLE BREATHING     FINDINGS:  The heart appears enlarged.  Mediastinal contour appears grossly normal.  No definite effusion or  pneumothorax is seen.  No suspicious pulmonary infiltrate.  There are calcified left hilar lymph  nodes, as before.      Impression:       1. Cardiomegaly.  2. No radiographic evidence of acute pulmonary abnormality.                  GABBY MOYA MD        Electronically Signed and Approved By: GABBY MOYA MD on 11/02/2023 at 19:44       LAB RESULTS:      Lab 11/04/23  0645 11/03/23  0307 11/02/23 1910   WBC 8.44 6.41 7.52   HEMOGLOBIN 14.0 13.0 13.2   HEMATOCRIT 43.9 39.8 40.8   PLATELETS 245 218 218   NEUTROS ABS  --  4.43 5.71   IMMATURE GRANS (ABS)  --  0.03 0.03   LYMPHS ABS  --  0.86 0.73   MONOS ABS  --  1.04* 1.01*   EOS ABS  --  0.01 0.01   MCV 91.6 90.7 91.5   PROCALCITONIN  --  0.08  --          Lab 11/04/23  0645 11/03/23  0307 11/02/23 1910   SODIUM 134* 135* 136   POTASSIUM 4.3 3.3* 3.9   CHLORIDE 102 101 100   CO2 23.1 24.9 27.0   ANION GAP 8.9 9.1 9.0   BUN 20 14 10   CREATININE 0.66 0.86 0.80   EGFR 88.8 68.4 74.6   GLUCOSE 141* 137* 116*   CALCIUM 8.9 9.0 9.1   MAGNESIUM 2.2 2.1 2.1   PHOSPHORUS 2.9  --   --    TSH  --  1.630  --          Lab 11/03/23  0307 11/02/23  1910   TOTAL PROTEIN 7.2 7.2   ALBUMIN 3.5 3.7   GLOBULIN 3.7 3.5   ALT (SGPT) 13 13   AST  (SGOT) 17 18   BILIRUBIN 0.9 0.9   ALK PHOS 48 49         Lab 11/02/23 1910   HSTROP T 23*                 Brief Urine Lab Results  (Last result in the past 365 days)        Color   Clarity   Blood   Leuk Est   Nitrite   Protein   CREAT   Urine HCG        11/02/23 2216 Dark Yellow   Clear   Negative   Trace   Negative   Trace                 Microbiology Results (last 10 days)       Procedure Component Value - Date/Time    COVID PRE-OP / PRE-PROCEDURE SCREENING ORDER (NO ISOLATION) - Swab, Nasopharynx [[id number]]  (Abnormal) Collected: 11/02/23 1910    Lab Status: Final result Specimen: Swab from Nasopharynx Updated: 11/02/23 2007    Narrative:      The following orders were created for panel order COVID PRE-OP / PRE-PROCEDURE SCREENING ORDER (NO ISOLATION) - Swab, Nasopharynx.  Procedure                               Abnormality         Status                     ---------                               -----------         ------                     COVID-19, FLU A/B, RSV P...[[id number]]  Abnormal            Final result                 Please view results for these tests on the individual orders.    COVID-19, FLU A/B, RSV PCR - Swab, Nasopharynx [[id number]]  (Abnormal) Collected: 11/02/23 1910    Lab Status: Final result Specimen: Swab from Nasopharynx Updated: 11/02/23 2007     COVID19 Detected     Influenza A PCR Not Detected     Influenza B PCR Not Detected     RSV, PCR Not Detected    Narrative:      Fact sheet for providers: https://www.fda.gov/media/047832/download    Fact sheet for patients: https://www.fda.gov/media/750137/download    Test performed by PCR.              Time spent on Discharge including face to face service: Greater than 30 minutes    Electronically signed by Emiliano Amaral MD, 11/04/23, 8:06 AM EDT.

## 2023-11-04 NOTE — PLAN OF CARE
Problem: Adult Inpatient Plan of Care  Goal: Plan of Care Review  Outcome: Met  Flowsheets (Taken 11/4/2023 1235)  Plan of Care Reviewed With: patient  Goal: Patient-Specific Goal (Individualized)  Outcome: Met  Goal: Absence of Hospital-Acquired Illness or Injury  Outcome: Met  Intervention: Identify and Manage Fall Risk  Recent Flowsheet Documentation  Taken 11/4/2023 1100 by Nuha Price RN  Safety Promotion/Fall Prevention:   nonskid shoes/slippers when out of bed   safety round/check completed  Taken 11/4/2023 0902 by Nuha Price RN  Safety Promotion/Fall Prevention: safety round/check completed  Taken 11/4/2023 0839 by Nuha Price RN  Safety Promotion/Fall Prevention:   nonskid shoes/slippers when out of bed   safety round/check completed  Taken 11/4/2023 0735 by Nuha Price RN  Safety Promotion/Fall Prevention:   nonskid shoes/slippers when out of bed   safety round/check completed  Intervention: Prevent Skin Injury  Recent Flowsheet Documentation  Taken 11/4/2023 0735 by Nuha Price RN  Body Position: position changed independently  Skin Protection:   adhesive use limited   tubing/devices free from skin contact  Intervention: Prevent and Manage VTE (Venous Thromboembolism) Risk  Recent Flowsheet Documentation  Taken 11/4/2023 0735 by Nuha Price RN  Activity Management:   ambulated to bathroom   activity encouraged   up ad lorenzo  Range of Motion: active ROM (range of motion) encouraged  Intervention: Prevent Infection  Recent Flowsheet Documentation  Taken 11/4/2023 1100 by Nuha Price RN  Infection Prevention:   hand hygiene promoted   single patient room provided  Taken 11/4/2023 0902 by Nuha Price RN  Infection Prevention:   hand hygiene promoted   single patient room provided  Taken 11/4/2023 0735 by Nuha Price RN  Infection Prevention:   hand hygiene promoted   single patient room provided  Goal: Optimal Comfort and Wellbeing  Outcome: Met  Intervention: Provide Person-Centered Care  Recent  Flowsheet Documentation  Taken 11/4/2023 0735 by Nuha Price RN  Trust Relationship/Rapport:   care explained   choices provided   empathic listening provided   emotional support provided   questions answered   questions encouraged   reassurance provided   thoughts/feelings acknowledged  Goal: Readiness for Transition of Care  Outcome: Met     Problem: Hypertension Comorbidity  Goal: Blood Pressure in Desired Range  Outcome: Met     Problem: Fall Injury Risk  Goal: Absence of Fall and Fall-Related Injury  Outcome: Met  Intervention: Promote Injury-Free Environment  Recent Flowsheet Documentation  Taken 11/4/2023 1100 by Nuha Price RN  Safety Promotion/Fall Prevention:   nonskid shoes/slippers when out of bed   safety round/check completed  Taken 11/4/2023 0902 by Nuha Price RN  Safety Promotion/Fall Prevention: safety round/check completed  Taken 11/4/2023 0839 by Nuha Price RN  Safety Promotion/Fall Prevention:   nonskid shoes/slippers when out of bed   safety round/check completed  Taken 11/4/2023 0735 by Nuha Price RN  Safety Promotion/Fall Prevention:   nonskid shoes/slippers when out of bed   safety round/check completed   Goal Outcome Evaluation:  Plan of Care Reviewed With: patient         Pt is alert and orient x4.  Vs WNL for pt.  Pt passed her walk test with resp. Tx.  Pt to discharge home this shift.

## 2023-11-05 ENCOUNTER — TRANSITIONAL CARE MANAGEMENT TELEPHONE ENCOUNTER (OUTPATIENT)
Dept: CALL CENTER | Facility: HOSPITAL | Age: 78
End: 2023-11-05
Payer: MEDICARE

## 2023-11-05 NOTE — OUTREACH NOTE
Call Center TCM Note      Flowsheet Row Responses   [facility] patient discharged from? [facility]   Does the patient have one of the following disease processes/diagnoses(primary or secondary)? Other   TCM attempt successful? Yes   Call start time 0950   Call end time 0954   Discharge diagnosis Covid 19   Meds reviewed with patient/caregiver? Yes   Is the patient having any side effects they believe may be caused by any medication additions or changes? No   Does the patient have all medications ordered at discharge? Yes   Is the patient taking all medications as directed (includes completed medication regime)? Yes   Medication comments Hold home simvastatin until Paxlovid treatment is completed.   Comments Scheduled hospital f/u appt. with PCP 11/13/23 @ 2:30pm.   Does the patient have an appointment with their PCP within 7-14 days of discharge? Yes   Has home health visited the patient within 72 hours of discharge? N/A   Psychosocial issues? No   Did the patient receive a copy of their discharge instructions? Yes   Nursing interventions Reviewed instructions with patient   What is the patient's perception of their health status since discharge? Improving   Is the patient/caregiver able to teach back signs and symptoms related to disease process for when to call PCP? Yes   Is the patient/caregiver able to teach back signs and symptoms related to disease process for when to call 911? Yes   Is the patient/caregiver able to teach back the hierarchy of who to call/visit for symptoms/problems? PCP, Specialist, Home health nurse, Urgent Care, ED, 911 Yes   If the patient is a current smoker, are they able to teach back resources for cessation? Not a smoker   TCM call completed? Yes   Call end time 0954   Would this patient benefit from a Referral to Amb Social Work? No   Is the patient interested in additional calls from an ambulatory ? No            Tamara Hennessy RN    11/5/2023, 09:57 EST

## 2023-11-06 ENCOUNTER — PATIENT OUTREACH (OUTPATIENT)
Dept: CASE MANAGEMENT | Facility: OTHER | Age: 78
End: 2023-11-06
Payer: MEDICARE

## 2023-11-06 DIAGNOSIS — U07.1 COVID-19: ICD-10-CM

## 2023-11-06 DIAGNOSIS — E11.9 TYPE 2 DIABETES MELLITUS WITHOUT COMPLICATION, WITHOUT LONG-TERM CURRENT USE OF INSULIN: ICD-10-CM

## 2023-11-06 DIAGNOSIS — I10 PRIMARY HYPERTENSION: Primary | ICD-10-CM

## 2023-11-06 NOTE — OUTREACH NOTE
AMBULATORY CASE MANAGEMENT NOTE    Name and Relationship of Patient/Support Person: Kelsey Durand - Self    Care Coordination    CM reached out to the patient and she stated hat she was feeling better now and that she had spent several days as an inpatient at [facility]. PT Dc 'd 11-4-23. Has follow up with PCP on 11-13-23. Patient taking meds as directed. CM reached out to office Mgr to confirm that the 13th appt would be ok and it was confirmed. . CM called the patient back and shred the information with her she verbalized understanding. The patient stated that her pulse ox was 96 on RA, /73 and HR 59. She stated she felt well. Chart reviewed , meds reviewed, patient stated no further needs at this time.All future appts reviewed . All questions were answered and contact information provided . Future outreach scheduled .       Education Documentation  No documentation found.        JEFFERY YOUNGBLOOD  Ambulatory Case Management    11/6/2023, 10:54 EST

## 2023-11-10 NOTE — PROGRESS NOTES
Enter Query Response Below      Query Response:     Acute respiratory failure was not supported          If applicable, please update the problem list.     Patient: Kelsey Durand        : 1945  Account: [id number]           Admit Date: 2023        How to Respond to this query:       a. Click New Note     b. Answer query within the yellow box.                c. Update the Problem List, if applicable.      If you have any questions about this query contact me at:  [email or web address]    :    78  year old admitted with COVID-19. Note diagnosis of Acute hypoxic respiratory failure per your H&P. Note oO2 sat 88 to 97% during stay. No O2 placed. After study, was acute respiratory failure clinically supported during this admission?    Acute respiratory failure was supported with additional clinical indicators:____________  Acute respiratory failure was not supported  Other- specify_____________  Unable to determine    By submitting this query, we are merely seeking further clarification of documentation to accurately reflect all conditions that you are monitoring, evaluating, treating or that extend the hospitalization or utilize additional resources of care. Please utilize your independent clinical judgment when addressing the question(s) above.     This query and your response, once completed, will be entered into the legal medical record.    Sincerely,  Valerie Miller RN  CDI  [employer]

## 2023-11-12 PROBLEM — U07.1 COVID-19: Status: RESOLVED | Noted: 2023-11-02 | Resolved: 2023-11-12

## 2023-11-12 PROBLEM — R10.11 RIGHT UPPER QUADRANT ABDOMINAL PAIN: Status: RESOLVED | Noted: 2022-08-29 | Resolved: 2023-11-12

## 2023-11-12 PROBLEM — R11.2 NAUSEA AND VOMITING: Status: RESOLVED | Noted: 2022-08-29 | Resolved: 2023-11-12

## 2023-11-13 ENCOUNTER — TELEPHONE (OUTPATIENT)
Dept: INTERNAL MEDICINE | Facility: CLINIC | Age: 78
End: 2023-11-13

## 2023-11-13 ENCOUNTER — OFFICE VISIT (OUTPATIENT)
Dept: INTERNAL MEDICINE | Facility: CLINIC | Age: 78
End: 2023-11-13
Payer: MEDICARE

## 2023-11-13 VITALS
DIASTOLIC BLOOD PRESSURE: 82 MMHG | SYSTOLIC BLOOD PRESSURE: 126 MMHG | OXYGEN SATURATION: 95 % | WEIGHT: 178 LBS | BODY MASS INDEX: 32.76 KG/M2 | TEMPERATURE: 98 F | HEART RATE: 63 BPM | HEIGHT: 62 IN

## 2023-11-13 DIAGNOSIS — D89.831 CYTOKINE RELEASE SYNDROME, GRADE 1: ICD-10-CM

## 2023-11-13 DIAGNOSIS — Z09 HOSPITAL DISCHARGE FOLLOW-UP: Primary | ICD-10-CM

## 2023-11-13 DIAGNOSIS — R19.7 ACUTE DIARRHEA: ICD-10-CM

## 2023-11-13 DIAGNOSIS — I48.91 LONE ATRIAL FIBRILLATION: ICD-10-CM

## 2023-11-13 DIAGNOSIS — I10 PRIMARY HYPERTENSION: ICD-10-CM

## 2023-11-13 RX ORDER — DICYCLOMINE HYDROCHLORIDE 10 MG/1
CAPSULE ORAL
Qty: 60 CAPSULE | Refills: 1 | Status: SHIPPED | OUTPATIENT
Start: 2023-11-13

## 2023-11-13 NOTE — PROGRESS NOTES
Chief Complaint  Hospital Follow Up Visit ([facility]- Hospital follow up due to covid. Pt states her BM's are abnormal. Diarrhea. )    Subjective          Kelsey Durand presents to [facility]     History of Present Illness:  Patient is a pleasant 78-year-old female with underlying hyperlipidemia, hypothyroidism, IFG, among others, who is coming in 8/23 for her routine 4-month follow-up.  We will review her medication list, go over her labs in detail, and address other concerns at that time.    ---> Patient is being seen 11/23 for Hospitial follow-up/TCM:  Date of Admission: 11/2/2023  Date of Discharge:  11/04/23  COVID-19 infection  Paroxysmal atrial fibrillation/flutter, new diagnosis  Shortness of breath secondary to COVID-19 pneumonia  Hypokalemia, new  --Patient was found to be COVID-19 positive.  The hospital service contact for further evaluation and management.  Patient started on Paxlovid.  Telemetry review did show patient to have paroxysmal atrial fibrillation/flutter.  Patient was treated with therapeutic Lovenox during hospitalization, discussion was had with patient regarding anticoagulation at discharge and patient states she wishes to hold off on starting medication and wishes to discuss further with her PCP after discharge.      Review of Systems   Constitutional:  Negative for appetite change, fatigue and fever.   HENT:  Negative for congestion and ear pain.    Eyes:  Negative for blurred vision.   Respiratory:  Negative for cough, chest tightness, shortness of breath and wheezing.    Cardiovascular:  Negative for chest pain, palpitations and leg swelling.   Gastrointestinal:  Negative for abdominal pain.   Genitourinary:  Negative for difficulty urinating, dysuria and hematuria.   Musculoskeletal:  Negative for arthralgias and gait problem.   Skin:  Negative for skin lesions.   Neurological:  Negative for syncope, memory problem and confusion.  "  Psychiatric/Behavioral:  Negative for self-injury and depressed mood.        Objective   Vital Signs:   /82   Pulse 63   Temp 98 °F (36.7 °C)   Ht 157.5 cm (62.01\")   Wt 80.7 kg (178 lb)   SpO2 95%   BMI 32.55 kg/m²           Physical Exam  Vitals and nursing note reviewed.   Constitutional:       General: She is not in acute distress.     Appearance: Normal appearance. She is not toxic-appearing.   HENT:      Head: Atraumatic.      Right Ear: External ear normal.      Left Ear: External ear normal.      Nose: Nose normal.      Mouth/Throat:      Mouth: Mucous membranes are moist.   Eyes:      General:         Right eye: No discharge.         Left eye: No discharge.      Extraocular Movements: Extraocular movements intact.      Pupils: Pupils are equal, round, and reactive to light.   Cardiovascular:      Rate and Rhythm: Normal rate and regular rhythm.      Pulses: Normal pulses.      Heart sounds: Normal heart sounds. No murmur heard.     No gallop.   Pulmonary:      Effort: Pulmonary effort is normal. No respiratory distress.      Breath sounds: No wheezing, rhonchi or rales.   Abdominal:      General: There is no distension.      Palpations: Abdomen is soft. There is no mass.      Tenderness: There is no abdominal tenderness. There is no guarding.   Musculoskeletal:         General: No swelling or tenderness.      Cervical back: No tenderness.      Right lower leg: No edema.      Left lower leg: No edema.   Skin:     General: Skin is warm and dry.      Findings: No rash.   Neurological:      General: No focal deficit present.      Mental Status: She is alert and oriented to person, place, and time. Mental status is at baseline.      Motor: No weakness.      Gait: Gait normal.   Psychiatric:         Mood and Affect: Mood normal.         Thought Content: Thought content normal.          Result Review :   The following data was reviewed by: Chris Elder MD on 10/18/2021:                  Assessment " and Plan    Diagnoses and all orders for this visit:    1. Hospital discharge follow-up (Primary)  Assessment & Plan:  Patient being seen 11/23 for another hospital follow-up.  Patient diagnosed with COVID in the ER.  She was sent there via EMS due to profound fatigue, patient was essentially bedridden.  Subsequently also went into A-fib briefly.  Please see the individualized headings for further details.      2. Cytokine release syndrome, grade 1  Assessment & Plan:  Patient was treated aggressively in the hospital, she was on Paxlovid and other supplemental conservative treatments.  She has completed the Paxlovid, and appropriately started back on her statin.  As noted in the discharge summary, she did not require any oxygen at time of discharge and presently here sats are 95% on room air.  No ongoing issues this regards.      3. Primary hypertension  Assessment & Plan:  Blood pressure stable and her heart rate is 63 as of her 11/23 hospital follow-up. She is fine to continue just low-dose metoprolol.      4. Lone atrial fibrillation  Overview:  Echo 11/23:       Left ventricular diastolic function is c/w (grade I) impaired relaxation.  Normally functioning LV.  No significant valvular abnormalities.  Normal left atrium size.       Assessment & Plan:  Patient had an episode of this while in the hospital for COVID.  It was noted on the telemetry monitors, it was absent at the time of her 2 EKGs.  She had the echo as noted above which was perfectly normal.  She does not have any prior history of this.  She is on low-dose metoprolol for hypertension and heart rate is well controlled around 60.  I think it is reasonable to forego full anticoagulation at this time, we will obviously monitor the patient for potential recurrence.      5. Acute diarrhea  Assessment & Plan:  Patient was hospitalized in 9/23 with acute viral gastroenteritis.  She recovered from that and now is having issues with diarrhea following course  "of treatment with Paxlovid.  She is on no other new medications, she completed the Paxlovid about a week ago.  Discussed with patient hopefully these episodes will run their course, we will get her some dicyclomine to use prior to meals since that seems to set this off.           --  --  OLDER NOTES:  VISIT 4/21:  ANNUAL MEDICARE WELLNESS PHYSICAL 4/19 = reviewed all forms in office with pt; no new discoveries; active at mall=class and walks/ bike at home.  H.M. ISSUES:  BMI 30 and d/w watch carbs and walk.  --  \"HYPERTENSION\" controlled=better at home='s (off meds good while)---> d/w check occ and let us know=fine at home as of 4/21.    LIPIDS at goal with LDL 68...101 so f/u before change...93 fine---> 80 stable 4/21.  --  HYPOTHYROIDISM stable 10/16 with TSH 0.2 still=on same dose many years---> fine 4/21.  IFG mild/stable = FBS 92 with A1C 6.0---> 6.3 in 4/20---> 6.0 in 4/21.  --  ANXIETY D/O 1/19 and has benzo to use PRN; d/w call if feels she needs to use it too often and will add SSRI...cheerful 10/19...will use low dose lexapro as of 4/20 OV---> seems better 10/20.  B12 JGY=533 in 4/20.  --  SYNCOPE is ? seizure per Dr Valadez/Flash in [facility] over the weekend, so will get EEG (tongue bite/incontinent)...EEG was neg 9/15.  --  BMD 5/17 = spine 0.9, hip 0.6 is great---> BMD 7/20 = spine 1.0, hip -0.3 = great!!  VIT D DEF remains stable on 2000 qd...28 and d/w take routinely please...44---> same 10/20.  --  DJD no new c/o.  R KNEE PAIN with no trauma, comes and goes, exam is w/o laxity, so agree with repeat mobic and use brace and then PTA if no better.  --  A.R. no flare.  --  --  MMG 5/4/2023 per Dr Lawler/Fabiola Pacheco.  COLON 11/19...polyp/FH+ = daughter = 5 years per Dr. Streeter  Adacel 3/14, Prevnar '16; Pneumovax #1 10/17; Hep A x2 10/18; d/w Shingrix 10/18;   ( Samuel 7/13, 2 girls here=Leigh Ann (no kids) and Xiao = youngest is boy in 9th grade 12/22 and girl with Asperger's is at " [employer]).    Follow Up   Return for Next scheduled follow up.  Patient was given instructions and counseling regarding her condition or for health maintenance advice. Please see specific information pulled into the AVS if appropriate.

## 2023-11-13 NOTE — ASSESSMENT & PLAN NOTE
Blood pressure stable and her heart rate is 63 as of her 11/23 hospital follow-up. She is fine to continue just low-dose metoprolol.

## 2023-11-13 NOTE — ASSESSMENT & PLAN NOTE
Patient was hospitalized in 9/23 with acute viral gastroenteritis.  She recovered from that and now is having issues with diarrhea following course of treatment with Paxlovid.  She is on no other new medications, she completed the Paxlovid about a week ago.  Discussed with patient hopefully these episodes will run their course, we will get her some dicyclomine to use prior to meals since that seems to set this off.

## 2023-11-13 NOTE — ASSESSMENT & PLAN NOTE
Patient was treated aggressively in the hospital, she was on Paxlovid and other supplemental conservative treatments.  She has completed the Paxlovid, and appropriately started back on her statin.  As noted in the discharge summary, she did not require any oxygen at time of discharge and presently here sats are 95% on room air.  No ongoing issues this regards.

## 2023-11-13 NOTE — TELEPHONE ENCOUNTER
Please let patient know that the labs that were scheduled at her August appointment to be done before her December appointment do in fact still need to be drawn.  She wondered if some of the blood test she had during her recent hospitalization would have included them.  So tell her to just plan on doing these before her December appointment, and remind her that she does not have to be fasting.  Thanks.

## 2023-11-13 NOTE — ASSESSMENT & PLAN NOTE
Patient being seen 11/23 for another hospital follow-up.  Patient diagnosed with COVID in the ER.  She was sent there via EMS due to profound fatigue, patient was essentially bedridden.  Subsequently also went into A-fib briefly.  Please see the individualized headings for further details.

## 2023-11-13 NOTE — ASSESSMENT & PLAN NOTE
Patient had an episode of this while in the hospital for COVID.  It was noted on the telemetry monitors, it was absent at the time of her 2 EKGs.  She had the echo as noted above which was perfectly normal.  She does not have any prior history of this.  She is on low-dose metoprolol for hypertension and heart rate is well controlled around 60.  I think it is reasonable to forego full anticoagulation at this time, we will obviously monitor the patient for potential recurrence.

## 2023-11-29 ENCOUNTER — PATIENT OUTREACH (OUTPATIENT)
Dept: CASE MANAGEMENT | Facility: OTHER | Age: 78
End: 2023-11-29
Payer: MEDICARE

## 2023-11-29 DIAGNOSIS — I10 PRIMARY HYPERTENSION: Primary | ICD-10-CM

## 2023-11-29 DIAGNOSIS — E78.2 MIXED HYPERLIPIDEMIA: ICD-10-CM

## 2023-11-29 DIAGNOSIS — E11.9 TYPE 2 DIABETES MELLITUS WITHOUT COMPLICATION, WITHOUT LONG-TERM CURRENT USE OF INSULIN: ICD-10-CM

## 2023-11-29 NOTE — OUTREACH NOTE
AMBULATORY CASE MANAGEMENT NOTE    Name and Relationship of Patient/Support Person:  -     CCM End of Month Documentation    This Chronic Medical Management Care Plan for Kelsey Durand, 78 y.o. female, has been monitored and managed; reviewed and a new plan of care implemented for the month of November.  A cumulative time of 22  minutes was spent on this patient record this month, including chart review; phone call with patient; electronic communication with primary care provider.    Regarding the patient's problems: has Acquired atrophy of thyroid; Mixed hyperlipidemia; Vitamin D deficiency; Primary osteoarthritis involving multiple joints; Hospital discharge follow-up; Recurrent major depression in partial remission; Primary hypertension; Gastroesophageal reflux disease without esophagitis; Gallstones; Neurologic gait dysfunction; Medicare annual wellness visit, subsequent; Sensory neuropathy; IFG (impaired fasting glucose); Cytokine release syndrome, grade 1; Lone atrial fibrillation; and Acute diarrhea on their problem list., the following items were addressed: medical records; medications and any changes can be found within the plan section of the note.  A detailed listing of time spent for chronic care management is tracked within each outreach encounter.  Current medications include:  has a current medication list which includes the following prescription(s): acetaminophen, dicyclomine, escitalopram, levothyroxine, metoprolol succinate xl, multivitamin with minerals, and simvastatin. and the patient is reported to be patient is compliant with medication protocol,  Medications are reported to be effective.   All notes on chart for PCP to review.    The patient was monitored remotely for blood pressure; blood glucose.    The patient's physical needs include:  needs met.     The patient's mental support needs include:  needs met; continued support    The patient's cognitive support needs include:  not  applicable    The patient's psychosocial support needs include:  needs met    The patient's functional needs include: needs met    The patient's environmental needs include:  not applicable, NA    Care Plan overall comments:  Care plan in place    Refer to previous outreach notes for more information on the areas listed above.    Monthly Billing Diagnoses  (I10) Primary hypertension    (E11.9) Type 2 diabetes mellitus without complication, without long-term current use of insulin    (E78.2) Mixed hyperlipidemia    Medications   Medications have been reconciled    Care Plan progress this month:      Recently Modified Care Plans Updates made since 10/29/2023 12:00 AM      No recently modified care plans.            Current Specialty Plan of Care Status not yet initiated    Instructions   Patient was provided an electronic copy of care plan  CCM services were explained and offered and patient has accepted these services.  Patient has given their written consent to receive CCM services and understands that this includes the authorization of electronic communication of medical information with the other treating providers.  Patient understands that they may stop CCM services at any time and these changes will be effective at the end of the calendar month and will effectively revocate the agreement of CCM services.  Patient understands that only one practitioner can furnish and be paid for CCM services during one calendar month.  Patient also understands that there may be co-payment or deductible fees in association with CCM services.  Patient will continue with at least monthly follow-up calls with the Ambulatory .    JEFFERY YOUNGBLOOD  Ambulatory Case Management    11/29/2023, 11:32 EST    Education Documentation  No documentation found.        JEFFERY YOUNGBLOOD  Ambulatory Case Management    11/29/2023, 11:32 EST

## 2023-12-05 ENCOUNTER — TELEPHONE (OUTPATIENT)
Dept: CASE MANAGEMENT | Facility: OTHER | Age: 78
End: 2023-12-05
Payer: MEDICARE

## 2023-12-05 ENCOUNTER — PATIENT OUTREACH (OUTPATIENT)
Dept: CASE MANAGEMENT | Facility: OTHER | Age: 78
End: 2023-12-05
Payer: MEDICARE

## 2023-12-05 ENCOUNTER — LAB (OUTPATIENT)
Dept: LAB | Facility: HOSPITAL | Age: 78
End: 2023-12-05
Payer: MEDICARE

## 2023-12-05 DIAGNOSIS — I10 PRIMARY HYPERTENSION: ICD-10-CM

## 2023-12-05 DIAGNOSIS — E11.9 TYPE 2 DIABETES MELLITUS WITHOUT COMPLICATION, WITHOUT LONG-TERM CURRENT USE OF INSULIN: Primary | ICD-10-CM

## 2023-12-05 DIAGNOSIS — I10 PRIMARY HYPERTENSION: Primary | ICD-10-CM

## 2023-12-05 DIAGNOSIS — R73.01 IFG (IMPAIRED FASTING GLUCOSE): ICD-10-CM

## 2023-12-05 DIAGNOSIS — E11.9 TYPE 2 DIABETES MELLITUS WITHOUT COMPLICATION, WITHOUT LONG-TERM CURRENT USE OF INSULIN: ICD-10-CM

## 2023-12-05 DIAGNOSIS — G62.9 SENSORY NEUROPATHY: ICD-10-CM

## 2023-12-05 LAB
ANION GAP SERPL CALCULATED.3IONS-SCNC: 8 MMOL/L (ref 5–15)
BUN SERPL-MCNC: 17 MG/DL (ref 8–23)
BUN/CREAT SERPL: 15.5 (ref 7–25)
CALCIUM SPEC-SCNC: 9.7 MG/DL (ref 8.6–10.5)
CHLORIDE SERPL-SCNC: 101 MMOL/L (ref 98–107)
CO2 SERPL-SCNC: 32 MMOL/L (ref 22–29)
CREAT SERPL-MCNC: 1.1 MG/DL (ref 0.57–1)
EGFRCR SERPLBLD CKD-EPI 2021: 50.9 ML/MIN/1.73
FOLATE SERPL-MCNC: 18.9 NG/ML (ref 4.78–24.2)
GLUCOSE SERPL-MCNC: 96 MG/DL (ref 65–99)
HBA1C MFR BLD: 6.3 % (ref 4.8–5.6)
POTASSIUM SERPL-SCNC: 4 MMOL/L (ref 3.5–5.2)
SODIUM SERPL-SCNC: 141 MMOL/L (ref 136–145)
VIT B12 BLD-MCNC: 473 PG/ML (ref 211–946)

## 2023-12-05 PROCEDURE — 80048 BASIC METABOLIC PNL TOTAL CA: CPT

## 2023-12-05 PROCEDURE — 84165 PROTEIN E-PHORESIS SERUM: CPT

## 2023-12-05 PROCEDURE — 83036 HEMOGLOBIN GLYCOSYLATED A1C: CPT

## 2023-12-05 PROCEDURE — 82607 VITAMIN B-12: CPT

## 2023-12-05 PROCEDURE — 84155 ASSAY OF PROTEIN SERUM: CPT

## 2023-12-05 PROCEDURE — 82746 ASSAY OF FOLIC ACID SERUM: CPT

## 2023-12-05 PROCEDURE — 86334 IMMUNOFIX E-PHORESIS SERUM: CPT

## 2023-12-05 PROCEDURE — 36415 COLL VENOUS BLD VENIPUNCTURE: CPT

## 2023-12-05 PROCEDURE — 82784 ASSAY IGA/IGD/IGG/IGM EACH: CPT

## 2023-12-05 NOTE — OUTREACH NOTE
AMBULATORY CASE MANAGEMENT NOTE    Name and Relationship of Patient/Support Person: Kelsey Durand - Self    Care Coordination      CM reached out to the patient and she stated that she was doing well stated no need for refills no complaints at this time the patient stated that she would be traveling today to the local lab to have labs drawn . Upon first review of the chart the CM did not see any orders listed for labs . CM will reach out to office for clarification. Chart reviewed , meds reviewed, patient stated no further needs at this time.All future appts reviewed . All questions were answered and contact information provided . Future outreach scheduled .       CM reached out to KALEIGH Isamarhaylee MA and was instructed that labs were from Aug 2023 and could be done in Dec. CM will notify patient.     CM reached out to the patient and explained about the lab orders and the patient verbalized understanding . .     Education Documentation  No documentation found.        JEFFERY YOUNGBLOOD  Ambulatory Case Management    12/5/2023, 16:05 EST

## 2023-12-07 LAB
ALBUMIN SERPL ELPH-MCNC: 3.5 G/DL (ref 2.9–4.4)
ALBUMIN/GLOB SERPL: 1.1 {RATIO} (ref 0.7–1.7)
ALPHA1 GLOB SERPL ELPH-MCNC: 0.2 G/DL (ref 0–0.4)
ALPHA2 GLOB SERPL ELPH-MCNC: 0.8 G/DL (ref 0.4–1)
B-GLOBULIN SERPL ELPH-MCNC: 0.9 G/DL (ref 0.7–1.3)
GAMMA GLOB SERPL ELPH-MCNC: 1.4 G/DL (ref 0.4–1.8)
GLOBULIN SER-MCNC: 3.4 G/DL (ref 2.2–3.9)
IGA SERPL-MCNC: 114 MG/DL (ref 64–422)
IGG SERPL-MCNC: 1499 MG/DL (ref 586–1602)
IGM SERPL-MCNC: 53 MG/DL (ref 26–217)
INTERPRETATION SERPL IEP-IMP: NORMAL
LABORATORY COMMENT REPORT: NORMAL
M PROTEIN SERPL ELPH-MCNC: NORMAL G/DL
PROT SERPL-MCNC: 6.9 G/DL (ref 6–8.5)

## 2023-12-20 ENCOUNTER — OFFICE VISIT (OUTPATIENT)
Dept: INTERNAL MEDICINE | Facility: CLINIC | Age: 78
End: 2023-12-20
Payer: MEDICARE

## 2023-12-20 ENCOUNTER — TELEPHONE (OUTPATIENT)
Dept: INTERNAL MEDICINE | Facility: CLINIC | Age: 78
End: 2023-12-20

## 2023-12-20 VITALS
HEART RATE: 65 BPM | WEIGHT: 180.8 LBS | TEMPERATURE: 98.7 F | SYSTOLIC BLOOD PRESSURE: 118 MMHG | OXYGEN SATURATION: 95 % | BODY MASS INDEX: 33.27 KG/M2 | DIASTOLIC BLOOD PRESSURE: 64 MMHG | HEIGHT: 62 IN

## 2023-12-20 DIAGNOSIS — Z12.31 SCREENING MAMMOGRAM FOR BREAST CANCER: Primary | ICD-10-CM

## 2023-12-20 DIAGNOSIS — E78.2 MIXED HYPERLIPIDEMIA: ICD-10-CM

## 2023-12-20 DIAGNOSIS — R73.01 IFG (IMPAIRED FASTING GLUCOSE): ICD-10-CM

## 2023-12-20 DIAGNOSIS — E55.9 VITAMIN D DEFICIENCY: ICD-10-CM

## 2023-12-20 DIAGNOSIS — E03.4 ACQUIRED ATROPHY OF THYROID: ICD-10-CM

## 2023-12-20 DIAGNOSIS — I10 PRIMARY HYPERTENSION: Primary | ICD-10-CM

## 2023-12-20 PROBLEM — R19.7 ACUTE DIARRHEA: Status: RESOLVED | Noted: 2023-11-13 | Resolved: 2023-12-20

## 2023-12-20 PROBLEM — D89.831 CYTOKINE RELEASE SYNDROME, GRADE 1: Status: RESOLVED | Noted: 2023-11-04 | Resolved: 2023-12-20

## 2023-12-20 PROBLEM — Z09 HOSPITAL DISCHARGE FOLLOW-UP: Status: RESOLVED | Noted: 2021-10-18 | Resolved: 2023-12-20

## 2023-12-20 PROBLEM — K21.9 GASTROESOPHAGEAL REFLUX DISEASE WITHOUT ESOPHAGITIS: Status: RESOLVED | Noted: 2022-08-25 | Resolved: 2023-12-20

## 2023-12-20 LAB — ALBUMIN UR-MCNC: <1.2 MG/DL

## 2023-12-20 PROCEDURE — 3078F DIAST BP <80 MM HG: CPT | Performed by: INTERNAL MEDICINE

## 2023-12-20 PROCEDURE — 3074F SYST BP LT 130 MM HG: CPT | Performed by: INTERNAL MEDICINE

## 2023-12-20 PROCEDURE — 99214 OFFICE O/P EST MOD 30 MIN: CPT | Performed by: INTERNAL MEDICINE

## 2023-12-20 PROCEDURE — 1160F RVW MEDS BY RX/DR IN RCRD: CPT | Performed by: INTERNAL MEDICINE

## 2023-12-20 PROCEDURE — 1159F MED LIST DOCD IN RCRD: CPT | Performed by: INTERNAL MEDICINE

## 2023-12-20 PROCEDURE — 82043 UR ALBUMIN QUANTITATIVE: CPT | Performed by: INTERNAL MEDICINE

## 2023-12-20 RX ORDER — LEVOTHYROXINE SODIUM 0.1 MG/1
100 TABLET ORAL EVERY MORNING
Qty: 90 TABLET | Refills: 1 | Status: SHIPPED | OUTPATIENT
Start: 2023-12-20

## 2023-12-20 NOTE — ASSESSMENT & PLAN NOTE
LDL was at goal in 8/23, she will continue with just very low-dose alternating day simvastatin, will repeat labs on return to office in the Spring.

## 2023-12-20 NOTE — ASSESSMENT & PLAN NOTE
Blood pressure remains well-controlled and her pulse is in the mid 60s as of her 12/23 routine office visit.  She is stable to continue with just low-dose metoprolol.    ? Early CKD as of 12/23, will repeat the labs on RTO.

## 2023-12-20 NOTE — PROGRESS NOTES
Chief Complaint  Follow-up (Routine follow up, labs completed), Hyperlipidemia, and right heel pain (Pt had mentioned heel pain previously and would like to discuss again as it is still painful. )    Subjective          Kelsey Durand presents to [facility]     History of Present Illness:  Patient is a pleasant 78-year-old female with underlying hyperlipidemia, hypothyroidism, IFG, among others, who is coming in 12/23 for her routine 4-month follow-up.  We will review her medication list, go over her labs in detail, and address other concerns at that time.    ---> seen 11/23 for Hospitial follow-up/TCM:  COVID-19 infection  Paroxysmal atrial fibrillation/flutter, new diagnosis  Shortness of breath secondary to COVID-19 pneumonia  Hypokalemia, new  --Patient was found to be COVID-19 positive.  The hospital service contact for further evaluation and management.  Patient started on Paxlovid.  Telemetry review did show patient to have paroxysmal atrial fibrillation/flutter.  Patient was treated with therapeutic Lovenox during hospitalization, discussion was had with patient regarding anticoagulation at discharge and patient states she wishes to hold off on starting medication and wishes to discuss further with her PCP after discharge.      Review of Systems   Constitutional:  Negative for appetite change, fatigue and fever.   HENT:  Negative for congestion and ear pain.    Eyes:  Negative for blurred vision.   Respiratory:  Negative for cough, chest tightness, shortness of breath and wheezing.    Cardiovascular:  Negative for chest pain, palpitations and leg swelling.   Gastrointestinal:  Negative for abdominal pain.   Genitourinary:  Negative for difficulty urinating, dysuria and hematuria.   Musculoskeletal:  Negative for arthralgias and gait problem.   Skin:  Negative for skin lesions.   Neurological:  Negative for syncope, memory problem and confusion.  "  Psychiatric/Behavioral:  Negative for self-injury and depressed mood.        Objective   Vital Signs:   /64 (BP Location: Right arm, Patient Position: Sitting)   Pulse 65   Temp 98.7 °F (37.1 °C) (Temporal)   Ht 157.5 cm (62.01\")   Wt 82 kg (180 lb 12.8 oz)   SpO2 95%   BMI 33.06 kg/m²           Physical Exam  Vitals and nursing note reviewed.   Constitutional:       General: She is not in acute distress.     Appearance: Normal appearance. She is not toxic-appearing.   HENT:      Head: Atraumatic.      Right Ear: External ear normal.      Left Ear: External ear normal.      Nose: Nose normal.      Mouth/Throat:      Mouth: Mucous membranes are moist.   Eyes:      General:         Right eye: No discharge.         Left eye: No discharge.      Extraocular Movements: Extraocular movements intact.      Pupils: Pupils are equal, round, and reactive to light.   Cardiovascular:      Rate and Rhythm: Normal rate and regular rhythm.      Pulses: Normal pulses.      Heart sounds: Normal heart sounds. No murmur heard.     No gallop.   Pulmonary:      Effort: Pulmonary effort is normal. No respiratory distress.      Breath sounds: No wheezing, rhonchi or rales.   Abdominal:      General: There is no distension.      Palpations: Abdomen is soft. There is no mass.      Tenderness: There is no abdominal tenderness. There is no guarding.   Musculoskeletal:         General: No swelling or tenderness.      Cervical back: No tenderness.      Right lower leg: No edema.      Left lower leg: No edema.   Skin:     General: Skin is warm and dry.      Findings: No rash.   Neurological:      General: No focal deficit present.      Mental Status: She is alert and oriented to person, place, and time. Mental status is at baseline.      Motor: No weakness.      Gait: Gait normal.   Psychiatric:         Mood and Affect: Mood normal.         Thought Content: Thought content normal.          Result Review :   The following data was " "reviewed by: Chris Elder MD on 10/18/2021:                  Assessment and Plan    Diagnoses and all orders for this visit:    1. Primary hypertension (Primary)  Assessment & Plan:  Blood pressure remains well-controlled and her pulse is in the mid 60s as of her 12/23 routine office visit.  She is stable to continue with just low-dose metoprolol.    ? Early CKD as of 12/23, will repeat the labs on RTO.    Orders:  -     Comprehensive Metabolic Panel; Future    2. Acquired atrophy of thyroid  Assessment & Plan:  Her thyroid was actually checked during her hospitalization in November, it was well normal, she can continue with 100 mcg daily.    Orders:  -     TSH; Future    3. Mixed hyperlipidemia  Assessment & Plan:  LDL was at goal in 8/23, she will continue with just very low-dose alternating day simvastatin, will repeat labs on return to office in the Spring.    Orders:  -     Lipid Panel; Future    4. IFG (impaired fasting glucose)  Assessment & Plan:  A1c is stable at 6.3 as of 12/23.  No medications, none indicated, will repeat labs in 4 months though.    Orders:  -     Hemoglobin A1c; Future  -     MicroAlbumin, Urine, Random - Urine, Clean Catch; Future    5. Vitamin D deficiency  -     Vitamin D,25-Hydroxy; Future    Other orders  -     levothyroxine (SYNTHROID, LEVOTHROID) 100 MCG tablet; Take 1 tablet by mouth Every Morning.  Dispense: 90 tablet; Refill: 1  -     Cancel: MicroAlbumin, Urine, Random - Urine, Clean Catch; Future         --  --  OLDER NOTES:  VISIT 4/21:  ANNUAL MEDICARE WELLNESS PHYSICAL 4/19 = reviewed all forms in office with pt; no new discoveries; active at mall=class and walks/ bike at home.  H.M. ISSUES:  BMI 30 and d/w watch carbs and walk.  --  \"HYPERTENSION\" controlled=better at home='s (off meds good while)---> d/w check occ and let us know=fine at home as of 4/21.    LIPIDS at goal with LDL 68...101 so f/u before change...93 fine---> 80 stable 4/21.  --  HYPOTHYROIDISM " stable 10/16 with TSH 0.2 still=on same dose many years---> fine 4/21.  IFG mild/stable = FBS 92 with A1C 6.0---> 6.3 in 4/20---> 6.0 in 4/21.  --  ANXIETY D/O 1/19 and has benzo to use PRN; d/w call if feels she needs to use it too often and will add SSRI...cheerful 10/19...will use low dose lexapro as of 4/20 OV---> seems better 10/20.  B12 YFW=194 in 4/20.  --  SYNCOPE is ? seizure per Dr Valadez/Flash in [facility] over the weekend, so will get EEG (tongue bite/incontinent)...EEG was neg 9/15.  --  BMD 5/17 = spine 0.9, hip 0.6 is great---> BMD 7/20 = spine 1.0, hip -0.3 = great!!  VIT D DEF remains stable on 2000 qd...28 and d/w take routinely please...44---> same 10/20.  --  DJD no new c/o.  R KNEE PAIN with no trauma, comes and goes, exam is w/o laxity, so agree with repeat mobic and use brace and then PTA if no better.  --  A.R. no flare.  --  --  MMG 5/4/2023 per Dr Lawler/Fabiola Pacheco.  COLON 11/19...polyp/FH+ = daughter = 5 years per Dr. Streeter  Adacel 3/14, Prevnar '16; Pneumovax #1 10/17; Hep A x2 10/18; d/w Shingrix 10/18;   ( Samuel 7/13, 2 girls here=Leigh Ann (no kids) and Xiao = youngest is boy in 9th grade 12/22 and girl with Asperger's is at [facility]).    Follow Up   Return in about 4 months (around 4/20/2024).  Patient was given instructions and counseling regarding her condition or for health maintenance advice. Please see specific information pulled into the AVS if appropriate.

## 2023-12-20 NOTE — ASSESSMENT & PLAN NOTE
A1c is stable at 6.3 as of 12/23.  No medications, none indicated, will repeat labs in 4 months though.

## 2023-12-20 NOTE — ASSESSMENT & PLAN NOTE
Her thyroid was actually checked during her hospitalization in November, it was well normal, she can continue with 100 mcg daily.

## 2024-01-24 ENCOUNTER — PATIENT OUTREACH (OUTPATIENT)
Dept: CASE MANAGEMENT | Facility: OTHER | Age: 79
End: 2024-01-24
Payer: MEDICARE

## 2024-01-24 DIAGNOSIS — E11.9 TYPE 2 DIABETES MELLITUS WITHOUT COMPLICATION, WITHOUT LONG-TERM CURRENT USE OF INSULIN: ICD-10-CM

## 2024-01-24 DIAGNOSIS — I10 PRIMARY HYPERTENSION: Primary | ICD-10-CM

## 2024-01-24 NOTE — OUTREACH NOTE
AMBULATORY CASE MANAGEMENT NOTE    Name and Relationship of Patient/Support Person: Kelsey Durand - Self      Care Coordination      CM made contact with patient . The patients over all condition was discussed and it was decided by the CM and the patient that the program could be stopped and the patient graduated. Patient was advised that program could be restarted if needed and if the patient had any needs or questions they could reach out to PCP office. Patient verbalized understanding.     Education Documentation  No documentation found.        JEFFERY YOUNGBLOOD  Ambulatory Case Management    1/24/2024, 10:15 EST

## 2024-01-31 ENCOUNTER — OFFICE VISIT (OUTPATIENT)
Dept: PODIATRY | Facility: CLINIC | Age: 79
End: 2024-01-31
Payer: MEDICARE

## 2024-01-31 VITALS
DIASTOLIC BLOOD PRESSURE: 64 MMHG | HEART RATE: 66 BPM | WEIGHT: 182 LBS | BODY MASS INDEX: 33.49 KG/M2 | TEMPERATURE: 97.9 F | SYSTOLIC BLOOD PRESSURE: 123 MMHG | HEIGHT: 62 IN | OXYGEN SATURATION: 98 %

## 2024-01-31 DIAGNOSIS — M76.61 ACHILLES TENDINITIS OF RIGHT LOWER EXTREMITY: ICD-10-CM

## 2024-01-31 DIAGNOSIS — G60.9 IDIOPATHIC NEUROPATHY: ICD-10-CM

## 2024-01-31 DIAGNOSIS — M79.671 FOOT PAIN, RIGHT: ICD-10-CM

## 2024-01-31 DIAGNOSIS — M76.61 ACHILLES BURSITIS OF RIGHT LOWER EXTREMITY: Primary | ICD-10-CM

## 2024-01-31 DIAGNOSIS — R26.2 DIFFICULTY WALKING: ICD-10-CM

## 2024-01-31 RX ORDER — METHYLPREDNISOLONE 4 MG/1
TABLET ORAL
Qty: 21 TABLET | Refills: 0 | Status: SHIPPED | OUTPATIENT
Start: 2024-01-31

## 2024-01-31 NOTE — PROGRESS NOTES
[facility] - PODIATRY    Today's Date: 01/31/24    Patient Name: Kelsey Durand  MRN: [id number]  CSN: [id number]  PCP: Chris Elder MD  Referring Provider: Referring, Self    SUBJECTIVE     Chief Complaint   Patient presents with    Right Foot - Establish Care, Pain     Heel pain 6-8 months      HPI: Kelsey Durand, a 78 y.o.female, presents to clinic.    New, Established, New Problem:  new    Location: Posterior aspect of right heel    Duration: Summer 2023    Onset: Insidious    Nature: Sore, sharp    Aggravating factors:  Patient relates pain is aggravated by shoe gear and ambulation.      Previous Treatment: Change in shoe gear, Tylenol strength arthritis    Patient denies any fevers, chills, nausea, vomiting, shortness of breath, nor any other constitutional signs nor symptoms.    Patient relates idiopathic neuropathy in her lower extremities and uses a walker for ambulation.    Past Medical History:   Diagnosis Date    Allergic rhinitis due to pollen     Atrophy of thyroid (acquired)     Chronic fatigue     Colitis     Difficulty walking 2018    Disease of thyroid gland     DM (diabetes mellitus)     Foot pain, right     Fracture, foot Apr 4 2022    Fell in yard Mon morning. Sorta loss balance. 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    Thyroid nodule     Unspecified osteoarthritis, unspecified site     Vitamin D deficiency, unspecified      Past Surgical History:   Procedure Laterality Date    CATARACT EXTRACTION      12/14/2011 Left Cataract Surgery 11/30/2011 right eye cataract surgery    COLONOSCOPY      11/2020 colonoscopy    LEEP  2000    OTHER SURGICAL HISTORY      FNA     Family History   Problem Relation Age of Onset    Colon cancer Neg Hx      Social History      Socioeconomic History    Marital status:    Tobacco Use    Smoking status: Never     Passive exposure: Yes    Smokeless tobacco: Never   Vaping Use    Vaping Use: Never used   Substance and Sexual Activity    Alcohol use: Not Currently    Drug use: Never    Sexual activity: Not Currently     Partners: Male     Birth control/protection: Condom, Pill, None, Birth control pill     Comment: I am 77 years old! why!     Allergies   Allergen Reactions    Sulfa Antibiotics Rash and Hives     Current Outpatient Medications   Medication Sig Dispense Refill    acetaminophen (TYLENOL) 500 MG tablet Take 1 tablet by mouth Every 6 (Six) Hours As Needed for Mild Pain.      dicyclomine (BENTYL) 10 MG capsule 1 to 2 capsules 30 minutes prior to meals as needed. 60 capsule 1    escitalopram (LEXAPRO) 5 MG tablet Take 1 tablet by mouth Daily. 90 tablet 1    levothyroxine (SYNTHROID, LEVOTHROID) 100 MCG tablet Take 1 tablet by mouth Every Morning. 90 tablet 1    metoprolol succinate XL (TOPROL-XL) 25 MG 24 hr tablet Take 1 tablet by mouth Daily. 90 tablet 1    multivitamin with minerals tablet tablet Take 1 tablet by mouth Daily.      simvastatin (ZOCOR) 10 MG tablet Take 1 tablet by mouth Every Other Day. 45 tablet 1    methylPREDNISolone (MEDROL) 4 MG dose pack Take as directed 21 tablet 0     No current facility-administered medications for this visit.     Review of Systems   Constitutional: Negative.    Musculoskeletal:         Posterior aspect of right heel   All other systems reviewed and are negative.      OBJECTIVE     Vitals:    01/31/24 0957   BP: 123/64   Pulse: 66   Temp: 97.9 °F (36.6 °C)   SpO2: 98%       WBC   Date Value Ref Range Status   11/04/2023 8.44 3.40 - 10.80 10*3/mm3 Final     RBC   Date Value Ref Range Status   11/04/2023 4.79 3.77 - 5.28 10*6/mm3 Final     Hemoglobin   Date Value Ref Range Status   11/04/2023 14.0 12.0 - 15.9 g/dL Final     Hematocrit   Date Value Ref Range Status   11/04/2023 43.9  34.0 - 46.6 % Final     MCV   Date Value Ref Range Status   11/04/2023 91.6 79.0 - 97.0 fL Final     MCH   Date Value Ref Range Status   11/04/2023 29.2 26.6 - 33.0 pg Final     MCHC   Date Value Ref Range Status   11/04/2023 31.9 31.5 - 35.7 g/dL Final     RDW   Date Value Ref Range Status   11/04/2023 14.5 12.3 - 15.4 % Final     RDW-SD   Date Value Ref Range Status   11/04/2023 48.5 37.0 - 54.0 fl Final     MPV   Date Value Ref Range Status   11/04/2023 10.2 6.0 - 12.0 fL Final     Platelets   Date Value Ref Range Status   11/04/2023 245 140 - 450 10*3/mm3 Final     Neutrophil %   Date Value Ref Range Status   11/03/2023 69.1 42.7 - 76.0 % Final     Lymphocyte %   Date Value Ref Range Status   11/03/2023 13.4 (L) 19.6 - 45.3 % Final     Monocyte %   Date Value Ref Range Status   11/03/2023 16.2 (H) 5.0 - 12.0 % Final     Eosinophil %   Date Value Ref Range Status   11/03/2023 0.2 (L) 0.3 - 6.2 % Final     Basophil %   Date Value Ref Range Status   11/03/2023 0.6 0.0 - 1.5 % Final     Immature Grans %   Date Value Ref Range Status   11/03/2023 0.5 0.0 - 0.5 % Final     Neutrophils, Absolute   Date Value Ref Range Status   11/03/2023 4.43 1.70 - 7.00 10*3/mm3 Final     Lymphocytes, Absolute   Date Value Ref Range Status   11/03/2023 0.86 0.70 - 3.10 10*3/mm3 Final     Monocytes, Absolute   Date Value Ref Range Status   11/03/2023 1.04 (H) 0.10 - 0.90 10*3/mm3 Final     Eosinophils, Absolute   Date Value Ref Range Status   11/03/2023 0.01 0.00 - 0.40 10*3/mm3 Final     Basophils, Absolute   Date Value Ref Range Status   11/03/2023 0.04 0.00 - 0.20 10*3/mm3 Final     Immature Grans, Absolute   Date Value Ref Range Status   11/03/2023 0.03 0.00 - 0.05 10*3/mm3 Final     nRBC   Date Value Ref Range Status   11/03/2023 0.0 0.0 - 0.2 /100 WBC Final         Lab Results   Component Value Date    GLUCOSE 96 12/05/2023    BUN 17 12/05/2023    CREATININE 1.10 (H) 12/05/2023    EGFR 50.9 (L) 12/05/2023    BCR 15.5 12/05/2023     K 4.0 12/05/2023    CO2 32.0 (H) 12/05/2023    CALCIUM 9.7 12/05/2023    PROTENTOTREF 6.9 12/05/2023    ALBUMIN 3.5 12/05/2023    BILITOT 0.9 11/03/2023    AST 17 11/03/2023    ALT 13 11/03/2023       Patient seen in no apparent distress.      PHYSICAL EXAM:     Foot/Ankle Exam    GENERAL  Appearance:  elderly  Orientation:  AAOx3  Affect:  appropriate  Gait:  unimpaired  Assistance:  independent  Right shoe gear: casual shoe  Left shoe gear: casual shoe    VASCULAR     Right Foot Vascularity   Dorsalis pedis:  1+  Posterior tibial:  1+  Skin temperature:  warm  Edema grading:  None  CFT:  < 3 seconds  Pedal hair growth:  Present  Varicosities:  moderate varicosities     Left Foot Vascularity   Dorsalis pedis:  1+  Posterior tibial:  1+  Skin temperature:  warm  Edema grading:  None  CFT:  < 3 seconds  Pedal hair growth:  Absent  Varicosities:  moderate varicosities     NEUROLOGIC     Right Foot Neurologic   Light touch sensation: diminished  Vibratory sensation: diminished  Hot/Cold sensation: diminished  Protective Sensation using [place] Monofilament:   Sites intact: 5  Sites tested: 10     Left Foot Neurologic   Light touch sensation: diminished  Vibratory sensation: diminished  Hot/Cold sensation:  diminished  Protective Sensation using [place] Monofilament:   Sites intact: 5  Sites tested: 10    MUSCLE STRENGTH     Right Foot Muscle Strength   Foot dorsiflexion:  4-  Foot plantar flexion:  4-  Foot inversion:  4-  Foot eversion:  4-     Left Foot Muscle Strength   Foot dorsiflexion:  4-  Foot plantar flexion:  4-  Foot inversion:  4-  Foot eversion:  4-    RANGE OF MOTION     Right Foot Range of Motion   Foot and ankle ROM within normal limits       Left Foot Range of Motion   Foot and ankle ROM within normal limits      DERMATOLOGIC      Right Foot Dermatologic   Skin  Right foot skin is intact.      Left Foot Dermatologic   Skin  Left foot skin is intact.     RADIOLOGY:      XR Foot 3+  View Right    Result Date: 1/31/2024  Narrative: IN-OFFICE IMAGING:  Standing, weightbearing, 3 view, AP, MO, Lateral, Right foot Indication:  Foot Pain Findings: Small posterior calcaneal enthesopathy seen.  Osteopenia changes seen throughout consistent with the patient's age.  No periosteal reactions nor osteolytic changes seen.  No occult fractures seen. Comparison: No comparison views available.     ASSESSMENT/PLAN     Diagnoses and all orders for this visit:    1. Achilles bursitis of right lower extremity (Primary)  -     methylPREDNISolone (MEDROL) 4 MG dose pack; Take as directed  Dispense: 21 tablet; Refill: 0    2. Achilles tendinitis of right lower extremity    3. Foot pain, right    4. Difficulty walking    5. Idiopathic neuropathy      Comprehensive lower extremity examination and evaluation was performed.    Discussed findings and treatment plan including risks, benefits, and treatment options with patient in detail. Patient agreed with treatment plan.    Medications and allergies reviewed.  Reviewed available lab values along with other pertinent labs.  These were discussed with the patient.    Rice Therapy: It is important to treat any injury as soon as possible to help control swelling and increase recovery time. The recognized regimen for immediate treatment of sport injuries includes rest, ice (cold application), compression, and elevation (RICE). Remove the injured athlete from play, apply ice to the affected area, wrap or compress the injured area with an elastic bandage when appropriate, and elevate the injured area above heart level to reduce swelling.  The patient is to not use ice for longer than 20 minutes at a time, with at least 20 minutes of no ice usage between applications.  The patient states understanding and agreement with this plan.    Patient instructed use OTC analgesics with dosing per package insert as needed.  Patient states understanding and agreement with this plan.    An  After Visit Summary was printed and given to the patient at discharge, including (if requested) any available informative/educational handouts regarding diagnosis, treatment, or medications. All questions were answered to patient/family satisfaction. Should symptoms fail to improve or worsen they agree to call or return to clinic or to go to the Emergency Department. Discussed the importance of following up with any needed screening tests/labs/specialist appointments and any requested follow-up recommended by me today. Importance of maintaining follow-up discussed and patient accepts that missed appointments can delay diagnosis and potentially lead to worsening of conditions.    Return in 3 weeks (on 2/21/2024) for Rx f/u., or sooner if acute issues arise.    This document has been electronically signed by Bin Vo DPM on January 31, 2024 10:41 EST

## 2024-02-12 ENCOUNTER — TELEPHONE (OUTPATIENT)
Dept: INTERNAL MEDICINE | Facility: CLINIC | Age: 79
End: 2024-02-12
Payer: MEDICARE

## 2024-04-02 ENCOUNTER — TELEPHONE (OUTPATIENT)
Dept: INTERNAL MEDICINE | Age: 79
End: 2024-04-02
Payer: MEDICARE

## 2024-04-02 RX ORDER — ESCITALOPRAM OXALATE 5 MG/1
5 TABLET ORAL DAILY
Qty: 90 TABLET | Refills: 1 | Status: SHIPPED | OUTPATIENT
Start: 2024-04-02

## 2024-04-03 ENCOUNTER — LAB (OUTPATIENT)
Dept: LAB | Facility: HOSPITAL | Age: 79
End: 2024-04-03
Payer: MEDICARE

## 2024-04-03 DIAGNOSIS — E55.9 VITAMIN D DEFICIENCY: ICD-10-CM

## 2024-04-03 DIAGNOSIS — E78.2 MIXED HYPERLIPIDEMIA: ICD-10-CM

## 2024-04-03 DIAGNOSIS — E03.4 ACQUIRED ATROPHY OF THYROID: ICD-10-CM

## 2024-04-03 DIAGNOSIS — R73.01 IFG (IMPAIRED FASTING GLUCOSE): ICD-10-CM

## 2024-04-03 DIAGNOSIS — I10 PRIMARY HYPERTENSION: ICD-10-CM

## 2024-04-03 LAB
25(OH)D3 SERPL-MCNC: 23 NG/ML (ref 30–100)
ALBUMIN SERPL-MCNC: 4 G/DL (ref 3.5–5.2)
ALBUMIN/GLOB SERPL: 1.2 G/DL
ALP SERPL-CCNC: 54 U/L (ref 39–117)
ALT SERPL W P-5'-P-CCNC: 18 U/L (ref 1–33)
ANION GAP SERPL CALCULATED.3IONS-SCNC: 9 MMOL/L (ref 5–15)
AST SERPL-CCNC: 18 U/L (ref 1–32)
BILIRUB SERPL-MCNC: 0.6 MG/DL (ref 0–1.2)
BUN SERPL-MCNC: 11 MG/DL (ref 8–23)
BUN/CREAT SERPL: 13.6 (ref 7–25)
CALCIUM SPEC-SCNC: 9.1 MG/DL (ref 8.6–10.5)
CHLORIDE SERPL-SCNC: 104 MMOL/L (ref 98–107)
CHOLEST SERPL-MCNC: 173 MG/DL (ref 0–200)
CO2 SERPL-SCNC: 27 MMOL/L (ref 22–29)
CREAT SERPL-MCNC: 0.81 MG/DL (ref 0.57–1)
EGFRCR SERPLBLD CKD-EPI 2021: 73.5 ML/MIN/1.73
GLOBULIN UR ELPH-MCNC: 3.3 GM/DL
GLUCOSE SERPL-MCNC: 102 MG/DL (ref 65–99)
HBA1C MFR BLD: 6.2 % (ref 4.8–5.6)
HDLC SERPL-MCNC: 60 MG/DL (ref 40–60)
LDLC SERPL CALC-MCNC: 91 MG/DL (ref 0–100)
LDLC/HDLC SERPL: 1.48 {RATIO}
POTASSIUM SERPL-SCNC: 4.4 MMOL/L (ref 3.5–5.2)
PROT SERPL-MCNC: 7.3 G/DL (ref 6–8.5)
SODIUM SERPL-SCNC: 140 MMOL/L (ref 136–145)
TRIGL SERPL-MCNC: 122 MG/DL (ref 0–150)
TSH SERPL DL<=0.05 MIU/L-ACNC: 2.28 UIU/ML (ref 0.27–4.2)
VLDLC SERPL-MCNC: 22 MG/DL (ref 5–40)

## 2024-04-03 PROCEDURE — 36415 COLL VENOUS BLD VENIPUNCTURE: CPT

## 2024-04-03 PROCEDURE — 82306 VITAMIN D 25 HYDROXY: CPT

## 2024-04-03 PROCEDURE — 80061 LIPID PANEL: CPT

## 2024-04-03 PROCEDURE — 83036 HEMOGLOBIN GLYCOSYLATED A1C: CPT

## 2024-04-03 PROCEDURE — 80053 COMPREHEN METABOLIC PANEL: CPT

## 2024-04-03 PROCEDURE — 84443 ASSAY THYROID STIM HORMONE: CPT

## 2024-04-18 ENCOUNTER — OFFICE VISIT (OUTPATIENT)
Dept: INTERNAL MEDICINE | Age: 79
End: 2024-04-18
Payer: MEDICARE

## 2024-04-18 VITALS
BODY MASS INDEX: 33.13 KG/M2 | TEMPERATURE: 98.2 F | HEIGHT: 62 IN | SYSTOLIC BLOOD PRESSURE: 132 MMHG | DIASTOLIC BLOOD PRESSURE: 84 MMHG | WEIGHT: 180 LBS | OXYGEN SATURATION: 94 % | HEART RATE: 82 BPM

## 2024-04-18 DIAGNOSIS — E55.9 VITAMIN D DEFICIENCY: ICD-10-CM

## 2024-04-18 DIAGNOSIS — E03.4 ACQUIRED ATROPHY OF THYROID: Primary | ICD-10-CM

## 2024-04-18 DIAGNOSIS — R73.01 IFG (IMPAIRED FASTING GLUCOSE): ICD-10-CM

## 2024-04-18 DIAGNOSIS — I10 PRIMARY HYPERTENSION: ICD-10-CM

## 2024-04-18 DIAGNOSIS — Z00.00 WELL ADULT EXAM: ICD-10-CM

## 2024-04-18 DIAGNOSIS — E78.2 MIXED HYPERLIPIDEMIA: ICD-10-CM

## 2024-04-18 RX ORDER — SIMVASTATIN 10 MG
10 TABLET ORAL EVERY OTHER DAY
Qty: 45 TABLET | Refills: 3 | Status: SHIPPED | OUTPATIENT
Start: 2024-04-18

## 2024-04-18 RX ORDER — LEVOTHYROXINE SODIUM 0.1 MG/1
100 TABLET ORAL EVERY MORNING
Qty: 90 TABLET | Refills: 3 | Status: SHIPPED | OUTPATIENT
Start: 2024-04-18

## 2024-04-18 NOTE — ASSESSMENT & PLAN NOTE
Vitamin D is trended down from 34 to 23 as of 4/24.  Recommend she get back on low-dose supplementation.

## 2024-04-18 NOTE — ASSESSMENT & PLAN NOTE
LDL is up slightly to at 91 as of 4/24, she says she has been eating some more eggs recently, she is just on very low-dose simvastatin, we can stick with that for now though

## 2024-04-18 NOTE — ASSESSMENT & PLAN NOTE
Blood pressure remains well-controlled and her pulse is in the mid 80s as of her 12/23 routine office visit.  She is stable to continue with just low-dose metoprolol

## 2024-04-18 NOTE — PROGRESS NOTES
Chief Complaint  Hypertension (Routine follow up, lab follow up. Med refill. )    Subjective          Kelsey Durand presents to [facility]     History of Present Illness:  Patient is a pleasant 79-year-old female with underlying hyperlipidemia, hypothyroidism, IFG, among others, who is coming in 4/24 for her routine 4-month follow-up.  We will review her medication list, go over her labs in detail, and address other concerns at that time.    ---> seen 11/23 for Hospitial follow-up/TCM:  COVID-19 infection  Paroxysmal atrial fibrillation/flutter, new diagnosis  Shortness of breath secondary to COVID-19 pneumonia  Hypokalemia, new  --Patient was found to be COVID-19 positive.  The hospital service contact for further evaluation and management.  Patient started on Paxlovid.  Telemetry review did show patient to have paroxysmal atrial fibrillation/flutter.  Patient was treated with therapeutic Lovenox during hospitalization, discussion was had with patient regarding anticoagulation at discharge and patient states she wishes to hold off on starting medication and wishes to discuss further with her PCP after discharge.      Review of Systems   Constitutional:  Negative for appetite change, fatigue and fever.   HENT:  Negative for congestion and ear pain.    Eyes:  Negative for blurred vision.   Respiratory:  Negative for cough, chest tightness, shortness of breath and wheezing.    Cardiovascular:  Negative for chest pain, palpitations and leg swelling.   Gastrointestinal:  Negative for abdominal pain.   Genitourinary:  Negative for difficulty urinating, dysuria and hematuria.   Musculoskeletal:  Negative for arthralgias and gait problem.   Skin:  Negative for skin lesions.   Neurological:  Negative for syncope, memory problem and confusion.   Psychiatric/Behavioral:  Negative for self-injury and depressed mood.        Objective   Vital Signs:   /84   Pulse 82   Temp 98.2  "°F (36.8 °C)   Ht 157.5 cm (62.01\")   Wt 81.6 kg (180 lb)   SpO2 94%   BMI 32.91 kg/m²           Physical Exam  Vitals and nursing note reviewed.   Constitutional:       General: She is not in acute distress.     Appearance: Normal appearance. She is not toxic-appearing.   HENT:      Head: Atraumatic.      Right Ear: External ear normal.      Left Ear: External ear normal.      Nose: Nose normal.      Mouth/Throat:      Mouth: Mucous membranes are moist.   Eyes:      General:         Right eye: No discharge.         Left eye: No discharge.      Extraocular Movements: Extraocular movements intact.      Pupils: Pupils are equal, round, and reactive to light.   Cardiovascular:      Rate and Rhythm: Normal rate and regular rhythm.      Pulses: Normal pulses.      Heart sounds: Normal heart sounds. No murmur heard.     No gallop.   Pulmonary:      Effort: Pulmonary effort is normal. No respiratory distress.      Breath sounds: No wheezing, rhonchi or rales.   Abdominal:      General: There is no distension.      Palpations: Abdomen is soft. There is no mass.      Tenderness: There is no abdominal tenderness. There is no guarding.   Musculoskeletal:         General: No swelling or tenderness.      Cervical back: No tenderness.      Right lower leg: No edema.      Left lower leg: No edema.   Skin:     General: Skin is warm and dry.      Findings: No rash.   Neurological:      General: No focal deficit present.      Mental Status: She is alert and oriented to person, place, and time. Mental status is at baseline.      Motor: No weakness.      Gait: Gait normal.   Psychiatric:         Mood and Affect: Mood normal.         Thought Content: Thought content normal.          Result Review :   The following data was reviewed by: Chris Elder MD on 10/18/2021:                  Assessment and Plan    Diagnoses and all orders for this visit:    1. Acquired atrophy of thyroid (Primary)  Assessment & Plan:  TSH is stable at 2.3, " "patient can continue with 100 mcg daily as of 4/24.    Orders:  -     TSH; Future    2. Primary hypertension  Assessment & Plan:  Blood pressure remains well-controlled and her pulse is in the mid 80s as of her 12/23 routine office visit.  She is stable to continue with just low-dose metoprolol     Orders:  -     Comprehensive Metabolic Panel; Future    3. IFG (impaired fasting glucose)  Assessment & Plan:  A1c is stable at 6.2 as of 4/24. No medications, none indicated, fine to repeat labs in 4-6 months.     Orders:  -     Hemoglobin A1c; Future    4. Mixed hyperlipidemia  Assessment & Plan:  LDL is up slightly to at 91 as of 4/24, she says she has been eating some more eggs recently, she is just on very low-dose simvastatin, we can stick with that for now though    Orders:  -     Lipid Panel; Future    5. Vitamin D deficiency  Assessment & Plan:  Vitamin D is trended down from 34 to 23 as of 4/24.  Recommend she get back on low-dose supplementation.    Orders:  -     Vitamin D,25-Hydroxy; Future    6. Well adult exam  -     Vitamin B12 anemia; Future  -     Folate anemia; Future  -     CBC & Differential; Future    Other orders  -     simvastatin (ZOCOR) 10 MG tablet; Take 1 tablet by mouth Every Other Day.  Dispense: 45 tablet; Refill: 3  -     levothyroxine (SYNTHROID, LEVOTHROID) 100 MCG tablet; Take 1 tablet by mouth Every Morning.  Dispense: 90 tablet; Refill: 3           --  --  OLDER NOTES:  VISIT 4/21:  ANNUAL MEDICARE WELLNESS PHYSICAL 4/19 = reviewed all forms in office with pt; no new discoveries; active at mall=class and walks/ bike at home.  H.M. ISSUES:  BMI 30 and d/w watch carbs and walk.  --  \"HYPERTENSION\" controlled=better at home='s (off meds good while)---> d/w check occ and let us know=fine at home as of 4/21.    LIPIDS at goal with LDL 68...101 so f/u before change...93 fine---> 80 stable 4/21.  --  HYPOTHYROIDISM stable 10/16 with TSH 0.2 still=on same dose many years---> fine " 4/21.  IFG mild/stable = FBS 92 with A1C 6.0---> 6.3 in 4/20---> 6.0 in 4/21.  --  ANXIETY D/O 1/19 and has benzo to use PRN; d/w call if feels she needs to use it too often and will add SSRI...cheerful 10/19...will use low dose lexapro as of 4/20 OV---> seems better 10/20.  B12 JWF=129 in 4/20.  --  SYNCOPE is ? seizure per Dr Valadez/Flash in [facility] over the weekend, so will get EEG (tongue bite/incontinent)...EEG was neg 9/15.  --  BMD 5/17 = spine 0.9, hip 0.6 is great---> BMD 7/20 = spine 1.0, hip -0.3 = great!!  VIT D DEF remains stable on 2000 qd...28 and d/w take routinely please...44---> same 10/20.  --  DJD no new c/o.  R KNEE PAIN with no trauma, comes and goes, exam is w/o laxity, so agree with repeat mobic and use brace and then PTA if no better.  --  A.R. no flare.  --  --  MMG 5/4/2023 per Dr Lawler/Fabiola Pacheco.  COLON 11/19...polyp/FH+ = daughter = 5 years per Dr. Streeter  Adacel 3/14, Prevnar '16; Pneumovax #1 10/17; Hep A x2 10/18; d/w Shingrix 10/18;   ( Samuel 7/13, 2 girls here=Leigh Ann (no kids) and Xiao = youngest is boy in 9th grade 12/22 and girl with Asperger's is at [facility]).    Follow Up   Return in about 6 months (around 10/18/2024).  Patient was given instructions and counseling regarding her condition or for health maintenance advice. Please see specific information pulled into the AVS if appropriate.

## 2024-04-18 NOTE — ASSESSMENT & PLAN NOTE
A1c is stable at 6.2 as of 4/24. No medications, none indicated, fine to repeat labs in 4-6 months.

## 2024-05-06 ENCOUNTER — HOSPITAL ENCOUNTER (OUTPATIENT)
Dept: MAMMOGRAPHY | Facility: HOSPITAL | Age: 79
Discharge: HOME | End: 2024-05-06
Admitting: INTERNAL MEDICINE
Payer: MEDICARE

## 2024-05-06 DIAGNOSIS — Z12.31 SCREENING MAMMOGRAM FOR BREAST CANCER: ICD-10-CM

## 2024-05-06 PROCEDURE — 77067 SCR MAMMO BI INCL CAD: CPT

## 2024-05-06 PROCEDURE — 77063 BREAST TOMOSYNTHESIS BI: CPT

## 2024-05-07 ENCOUNTER — TELEPHONE (OUTPATIENT)
Dept: INTERNAL MEDICINE | Age: 79
End: 2024-05-07
Payer: MEDICARE

## 2024-05-07 DIAGNOSIS — Z12.31 SCREENING MAMMOGRAM FOR BREAST CANCER: Primary | ICD-10-CM

## 2024-05-07 RX ORDER — CLOTRIMAZOLE 1 %
1 CREAM (GRAM) TOPICAL 2 TIMES DAILY
Qty: 28 G | Refills: 1 | Status: SHIPPED | OUTPATIENT
Start: 2024-05-07

## 2024-05-08 ENCOUNTER — OFFICE VISIT (OUTPATIENT)
Dept: PODIATRY | Facility: CLINIC | Age: 79
End: 2024-05-08
Payer: MEDICARE

## 2024-05-08 VITALS
HEART RATE: 67 BPM | SYSTOLIC BLOOD PRESSURE: 126 MMHG | TEMPERATURE: 96.9 F | BODY MASS INDEX: 33.31 KG/M2 | WEIGHT: 181 LBS | DIASTOLIC BLOOD PRESSURE: 75 MMHG | OXYGEN SATURATION: 93 % | HEIGHT: 62 IN

## 2024-05-08 DIAGNOSIS — G60.9 IDIOPATHIC NEUROPATHY: ICD-10-CM

## 2024-05-08 DIAGNOSIS — M76.61 ACHILLES BURSITIS OF RIGHT LOWER EXTREMITY: Primary | ICD-10-CM

## 2024-05-08 DIAGNOSIS — M79.671 FOOT PAIN, RIGHT: ICD-10-CM

## 2024-05-08 DIAGNOSIS — R26.2 DIFFICULTY WALKING: ICD-10-CM

## 2024-05-08 RX ORDER — BUPIVACAINE HYDROCHLORIDE 5 MG/ML
0.5 INJECTION, SOLUTION PERINEURAL ONCE
Status: COMPLETED | OUTPATIENT
Start: 2024-05-08 | End: 2024-05-08

## 2024-05-08 RX ORDER — TESTOSTERONE CYPIONATE 200 MG/ML
4 INJECTION INTRAMUSCULAR ONCE
Status: COMPLETED | OUTPATIENT
Start: 2024-05-08 | End: 2024-05-08

## 2024-05-08 RX ADMIN — TESTOSTERONE CYPIONATE 4 MG: 200 INJECTION INTRAMUSCULAR at 13:10

## 2024-05-08 RX ADMIN — BUPIVACAINE HYDROCHLORIDE 0.5 ML: 5 INJECTION, SOLUTION PERINEURAL at 13:10

## 2024-05-20 RX ORDER — METOPROLOL SUCCINATE 25 MG/1
25 TABLET, EXTENDED RELEASE ORAL DAILY
Qty: 90 TABLET | Refills: 0 | Status: SHIPPED | OUTPATIENT
Start: 2024-05-20

## 2024-07-29 ENCOUNTER — OFFICE VISIT (OUTPATIENT)
Dept: INTERNAL MEDICINE | Age: 79
End: 2024-07-29
Payer: MEDICARE

## 2024-07-29 VITALS
WEIGHT: 178.8 LBS | SYSTOLIC BLOOD PRESSURE: 118 MMHG | BODY MASS INDEX: 32.9 KG/M2 | OXYGEN SATURATION: 96 % | DIASTOLIC BLOOD PRESSURE: 68 MMHG | TEMPERATURE: 98 F | HEIGHT: 62 IN | HEART RATE: 71 BPM

## 2024-07-29 DIAGNOSIS — R30.0 DYSURIA: ICD-10-CM

## 2024-07-29 DIAGNOSIS — B37.31 CANDIDIASIS OF FEMALE GENITALIA: Primary | ICD-10-CM

## 2024-07-29 LAB
BILIRUB BLD-MCNC: NEGATIVE MG/DL
CLARITY, POC: ABNORMAL
COLOR UR: YELLOW
EXPIRATION DATE: ABNORMAL
GLUCOSE UR STRIP-MCNC: NEGATIVE MG/DL
KETONES UR QL: NEGATIVE
LEUKOCYTE EST, POC: NEGATIVE
Lab: ABNORMAL
NITRITE UR-MCNC: NEGATIVE MG/ML
PH UR: 6 [PH] (ref 5–8)
PROT UR STRIP-MCNC: NEGATIVE MG/DL
RBC # UR STRIP: NEGATIVE /UL
SP GR UR: 1.01 (ref 1–1.03)
UROBILINOGEN UR QL: NORMAL

## 2024-07-29 PROCEDURE — 3074F SYST BP LT 130 MM HG: CPT | Performed by: INTERNAL MEDICINE

## 2024-07-29 PROCEDURE — 1160F RVW MEDS BY RX/DR IN RCRD: CPT | Performed by: INTERNAL MEDICINE

## 2024-07-29 PROCEDURE — 1125F AMNT PAIN NOTED PAIN PRSNT: CPT | Performed by: INTERNAL MEDICINE

## 2024-07-29 PROCEDURE — 3078F DIAST BP <80 MM HG: CPT | Performed by: INTERNAL MEDICINE

## 2024-07-29 PROCEDURE — 1159F MED LIST DOCD IN RCRD: CPT | Performed by: INTERNAL MEDICINE

## 2024-07-29 PROCEDURE — 81003 URINALYSIS AUTO W/O SCOPE: CPT | Performed by: INTERNAL MEDICINE

## 2024-07-29 PROCEDURE — 99213 OFFICE O/P EST LOW 20 MIN: CPT | Performed by: INTERNAL MEDICINE

## 2024-07-29 RX ORDER — FLUCONAZOLE 150 MG/1
TABLET ORAL
Qty: 3 TABLET | Refills: 1 | Status: SHIPPED | OUTPATIENT
Start: 2024-07-29

## 2024-07-29 NOTE — PATIENT INSTRUCTIONS
Try diflucan every 72 hrs x 3 doses- and can use lotrimin cream BID prn   Bring in UA if possible from home-patient came back gave us UA which is negative for blood nitrite and leukocytes

## 2024-07-29 NOTE — PROGRESS NOTES
CHIEF COMPLAINT  Kelsey Durand presents to [facility] for follow-up of Urinary Tract Infection (Burning and urgency been using vagacil ) and Vaginal Itching.    HPI  79-year-old female with underlying hyperlipidemia, hypothyroidism, IFG among others, patient of Dr. Chris Faye here with complaint of itching at pelvic area-burning is constant-and urgency for couple weeks..Took one dose of diflucan and felt better-with less itching and burning-  Unable to give us any urine-no vag discharge-    UA at clinic negative leukocytes negative nitrite negative blood    Records reviewed, meds reviewed in detail, labs reviewed with patient.  Plan of care is as follows below.    Current Outpatient Medications:     acetaminophen (TYLENOL) 500 MG tablet, Take 1 tablet by mouth Every 6 (Six) Hours As Needed for Mild Pain., Disp: , Rfl:     clotrimazole (LOTRIMIN) 1 % cream, Apply 1 Application topically to the appropriate area as directed 2 (Two) Times a Day., Disp: 28 g, Rfl: 1    dicyclomine (BENTYL) 10 MG capsule, 1 to 2 capsules 30 minutes prior to meals as needed., Disp: 60 capsule, Rfl: 1    escitalopram (LEXAPRO) 5 MG tablet, Take 1 tablet by mouth Daily., Disp: 90 tablet, Rfl: 1    levothyroxine (SYNTHROID, LEVOTHROID) 100 MCG tablet, Take 1 tablet by mouth Every Morning., Disp: 90 tablet, Rfl: 3    metoprolol succinate XL (TOPROL-XL) 25 MG 24 hr tablet, TAKE 1 TABLET BY MOUTH EVERY DAY, Disp: 90 tablet, Rfl: 0    multivitamin with minerals tablet tablet, Take 1 tablet by mouth Daily., Disp: , Rfl:     simvastatin (ZOCOR) 10 MG tablet, Take 1 tablet by mouth Every Other Day., Disp: 45 tablet, Rfl: 3    fluconazole (Diflucan) 150 MG tablet, One tab po every 72 hrs x 3 doses, Disp: 3 tablet, Rfl: 1   PFSH reviewed.      OBJECTIVE  Vital Signs  Vitals:    07/29/24 1125   BP: 118/68   BP Location: Left arm   Patient Position: Sitting   Cuff Size: Small Adult   Pulse: 71   Temp: 98 °F  "(36.7 °C)   TempSrc: Temporal   SpO2: 96%   Weight: 81.1 kg (178 lb 12.8 oz)   Height: 157.5 cm (62\")      Body mass index is 32.7 kg/m².    Physical Exam  Vitals and nursing note reviewed.   Constitutional:       Appearance: Normal appearance.   HENT:      Head: Normocephalic and atraumatic.   Cardiovascular:      Rate and Rhythm: Normal rate and regular rhythm.   Pulmonary:      Effort: Pulmonary effort is normal.      Breath sounds: Normal breath sounds.   Abdominal:      Palpations: Abdomen is soft.   Musculoskeletal:      Cervical back: Normal range of motion and neck supple.   Neurological:      General: No focal deficit present.      Mental Status: She is alert and oriented to person, place, and time.          RESULTS REVIEW  No results found for: \"PROBNP\", \"BNP\"  CMP          11/4/2023    06:45 12/5/2023    16:54 4/3/2024    09:10   CMP   Glucose 141  96  102    BUN 20  17  11    Creatinine 0.66  1.10  0.81    EGFR 88.8  50.9  73.5    Sodium 134  141  140    Potassium 4.3  4.0  4.4    Chloride 102  101  104    Calcium 8.9  9.7  9.1    Total Protein  6.9     Total Protein   7.3    Albumin  3.5  4.0    Globulin  3.4     Globulin   3.3    Total Bilirubin   0.6    Alkaline Phosphatase   54    AST (SGOT)   18    ALT (SGPT)   18    Albumin/Globulin Ratio   1.2    BUN/Creatinine Ratio 30.3  15.5  13.6    Anion Gap 8.9  8.0  9.0      CBC w/diff          11/2/2023    19:10 11/3/2023    03:07 11/4/2023    06:45   CBC w/Diff   WBC 7.52  6.41  8.44    RBC 4.46  4.39  4.79    Hemoglobin 13.2  13.0  14.0    Hematocrit 40.8  39.8  43.9    MCV 91.5  90.7  91.6    MCH 29.6  29.6  29.2    MCHC 32.4  32.7  31.9    RDW 14.6  14.6  14.5    Platelets 218  218  245    Neutrophil Rel % 76.0  69.1     Immature Granulocyte Rel % 0.4  0.5     Lymphocyte Rel % 9.7  13.4     Monocyte Rel % 13.4  16.2     Eosinophil Rel % 0.1  0.2     Basophil Rel % 0.4  0.6        Lipid Panel          8/10/2023    09:20 4/3/2024    09:10   Lipid Panel "   Total Cholesterol 150  173    Triglycerides 93  122    HDL Cholesterol 57  60    VLDL Cholesterol 17  22    LDL Cholesterol  76  91    LDL/HDL Ratio 1.31  1.48       Lab Results   Component Value Date    TSH 2.280 04/03/2024    TSH 1.630 11/03/2023    TSH 1.380 08/10/2023      Lab Results   Component Value Date    FREET4 1.40 12/06/2022    FREET4 1.41 06/13/2022    FREET4 1.6 01/02/2021      A1C Last 3 Results          8/10/2023    09:20 12/5/2023    16:54 4/3/2024    09:10   HGBA1C Last 3 Results   Hemoglobin A1C 6.30  6.30  6.20       Lab Results   Component Value Date    IJRHZVHZ44 473 12/05/2023    AKOR35ST 23.0 (L) 04/03/2024    MG 2.2 11/04/2023        Mammo Screening Digital Tomosynthesis Bilateral With CAD    Result Date: 5/6/2024  Benign findings. No mammographic evidence of malignancy. Recommend routine mammographic screening.  BI-RADS ASSESSMENT: BI-RADS 2. Benign findings.  The patient's information is entered into a computerized reminder system with a targeted due date for the next mammogram.  Note:  It has been reported that there is approximately a 15% false negative rate in mammography.  Therefore, management of a palpable abnormality should not be deferred because of a negative mammogram.     Electronically Signed By-TOMMY CARDOSO MD On:5/6/2024 1:13 PM                 ASSESSMENT & PLAN  Diagnoses and all orders for this visit:    1. Candidiasis of female genitalia (Primary)  Comments:  Try diflucan every 72 hrs x 3 doses- and can use lotrimin cream BID prn  Orders:  -     fluconazole (Diflucan) 150 MG tablet; One tab po every 72 hrs x 3 doses  Dispense: 3 tablet; Refill: 1    2. Dysuria  -     fluconazole (Diflucan) 150 MG tablet; One tab po every 72 hrs x 3 doses  Dispense: 3 tablet; Refill: 1                 Patient Instructions   Try diflucan every 72 hrs x 3 doses- and can use lotrimin cream BID prn   Bring in UA if possible from home-patient came back gave us UA which is negative for blood  nitrite and leukocytes     FOLLOW UP  Return for Recheck, Next scheduled follow up.    Patient was given instructions and counseling regarding her condition or for health maintenance advice. Please see specific information pulled into the AVS if appropriate.

## 2024-08-19 ENCOUNTER — TELEPHONE (OUTPATIENT)
Dept: GASTROENTEROLOGY | Facility: CLINIC | Age: 79
End: 2024-08-19

## 2024-08-27 RX ORDER — METOPROLOL SUCCINATE 25 MG/1
25 TABLET, EXTENDED RELEASE ORAL DAILY
Qty: 90 TABLET | Refills: 3 | Status: SHIPPED | OUTPATIENT
Start: 2024-08-27

## 2024-10-03 ENCOUNTER — LAB (OUTPATIENT)
Dept: INTERNAL MEDICINE | Age: 79
End: 2024-10-03
Payer: MEDICARE

## 2024-10-03 DIAGNOSIS — R73.01 IFG (IMPAIRED FASTING GLUCOSE): ICD-10-CM

## 2024-10-03 DIAGNOSIS — Z00.00 WELL ADULT EXAM: ICD-10-CM

## 2024-10-03 DIAGNOSIS — E55.9 VITAMIN D DEFICIENCY: ICD-10-CM

## 2024-10-03 DIAGNOSIS — E78.2 MIXED HYPERLIPIDEMIA: ICD-10-CM

## 2024-10-03 DIAGNOSIS — I10 PRIMARY HYPERTENSION: ICD-10-CM

## 2024-10-03 DIAGNOSIS — E03.4 ACQUIRED ATROPHY OF THYROID: ICD-10-CM

## 2024-10-03 LAB
25(OH)D3 SERPL-MCNC: 49.5 NG/ML (ref 30–100)
ALBUMIN SERPL-MCNC: 4 G/DL (ref 3.5–5.2)
ALBUMIN/GLOB SERPL: 1.3 G/DL
ALP SERPL-CCNC: 52 U/L (ref 39–117)
ALT SERPL W P-5'-P-CCNC: 14 U/L (ref 1–33)
ANION GAP SERPL CALCULATED.3IONS-SCNC: 11 MMOL/L (ref 5–15)
AST SERPL-CCNC: 23 U/L (ref 1–32)
BASOPHILS # BLD AUTO: 0.04 10*3/MM3 (ref 0–0.2)
BASOPHILS NFR BLD AUTO: 0.9 % (ref 0–1.5)
BILIRUB SERPL-MCNC: 0.8 MG/DL (ref 0–1.2)
BUN SERPL-MCNC: 16 MG/DL (ref 8–23)
BUN/CREAT SERPL: 17.4 (ref 7–25)
CALCIUM SPEC-SCNC: 9.5 MG/DL (ref 8.6–10.5)
CHLORIDE SERPL-SCNC: 101 MMOL/L (ref 98–107)
CHOLEST SERPL-MCNC: 158 MG/DL (ref 0–200)
CO2 SERPL-SCNC: 24 MMOL/L (ref 22–29)
CREAT SERPL-MCNC: 0.92 MG/DL (ref 0.57–1)
DEPRECATED RDW RBC AUTO: 43.1 FL (ref 37–54)
EGFRCR SERPLBLD CKD-EPI 2021: 62.7 ML/MIN/1.73
EOSINOPHIL # BLD AUTO: 0.19 10*3/MM3 (ref 0–0.4)
EOSINOPHIL NFR BLD AUTO: 4.1 % (ref 0.3–6.2)
ERYTHROCYTE [DISTWIDTH] IN BLOOD BY AUTOMATED COUNT: 13.1 % (ref 12.3–15.4)
FOLATE SERPL-MCNC: >20 NG/ML (ref 4.78–24.2)
GLOBULIN UR ELPH-MCNC: 3.2 GM/DL
GLUCOSE SERPL-MCNC: 94 MG/DL (ref 65–99)
HBA1C MFR BLD: 6.1 % (ref 4.8–5.6)
HCT VFR BLD AUTO: 41.4 % (ref 34–46.6)
HDLC SERPL-MCNC: 55 MG/DL (ref 40–60)
HGB BLD-MCNC: 13.8 G/DL (ref 12–15.9)
IMM GRANULOCYTES # BLD AUTO: 0.02 10*3/MM3 (ref 0–0.05)
IMM GRANULOCYTES NFR BLD AUTO: 0.4 % (ref 0–0.5)
LDLC SERPL CALC-MCNC: 87 MG/DL (ref 0–100)
LDLC/HDLC SERPL: 1.57 {RATIO}
LYMPHOCYTES # BLD AUTO: 1.08 10*3/MM3 (ref 0.7–3.1)
LYMPHOCYTES NFR BLD AUTO: 23.2 % (ref 19.6–45.3)
MCH RBC QN AUTO: 30.1 PG (ref 26.6–33)
MCHC RBC AUTO-ENTMCNC: 33.3 G/DL (ref 31.5–35.7)
MCV RBC AUTO: 90.4 FL (ref 79–97)
MONOCYTES # BLD AUTO: 0.56 10*3/MM3 (ref 0.1–0.9)
MONOCYTES NFR BLD AUTO: 12 % (ref 5–12)
NEUTROPHILS NFR BLD AUTO: 2.76 10*3/MM3 (ref 1.7–7)
NEUTROPHILS NFR BLD AUTO: 59.4 % (ref 42.7–76)
NRBC BLD AUTO-RTO: 0 /100 WBC (ref 0–0.2)
PLATELET # BLD AUTO: 263 10*3/MM3 (ref 140–450)
PMV BLD AUTO: 10.2 FL (ref 6–12)
POTASSIUM SERPL-SCNC: 4.2 MMOL/L (ref 3.5–5.2)
PROT SERPL-MCNC: 7.2 G/DL (ref 6–8.5)
RBC # BLD AUTO: 4.58 10*6/MM3 (ref 3.77–5.28)
SODIUM SERPL-SCNC: 136 MMOL/L (ref 136–145)
TRIGL SERPL-MCNC: 82 MG/DL (ref 0–150)
TSH SERPL DL<=0.05 MIU/L-ACNC: 1.58 UIU/ML (ref 0.27–4.2)
VIT B12 BLD-MCNC: 373 PG/ML (ref 211–946)
VLDLC SERPL-MCNC: 16 MG/DL (ref 5–40)
WBC NRBC COR # BLD AUTO: 4.65 10*3/MM3 (ref 3.4–10.8)

## 2024-10-03 PROCEDURE — 80061 LIPID PANEL: CPT | Performed by: INTERNAL MEDICINE

## 2024-10-03 PROCEDURE — 82746 ASSAY OF FOLIC ACID SERUM: CPT | Performed by: INTERNAL MEDICINE

## 2024-10-03 PROCEDURE — 82306 VITAMIN D 25 HYDROXY: CPT | Performed by: INTERNAL MEDICINE

## 2024-10-03 PROCEDURE — 83036 HEMOGLOBIN GLYCOSYLATED A1C: CPT | Performed by: INTERNAL MEDICINE

## 2024-10-03 PROCEDURE — 82607 VITAMIN B-12: CPT | Performed by: INTERNAL MEDICINE

## 2024-10-03 PROCEDURE — 85025 COMPLETE CBC W/AUTO DIFF WBC: CPT | Performed by: INTERNAL MEDICINE

## 2024-10-03 PROCEDURE — 80053 COMPREHEN METABOLIC PANEL: CPT | Performed by: INTERNAL MEDICINE

## 2024-10-03 PROCEDURE — 84443 ASSAY THYROID STIM HORMONE: CPT | Performed by: INTERNAL MEDICINE

## 2024-10-03 PROCEDURE — 36415 COLL VENOUS BLD VENIPUNCTURE: CPT | Performed by: INTERNAL MEDICINE

## 2024-10-18 ENCOUNTER — OFFICE VISIT (OUTPATIENT)
Dept: INTERNAL MEDICINE | Age: 79
End: 2024-10-18
Payer: MEDICARE

## 2024-10-18 VITALS
SYSTOLIC BLOOD PRESSURE: 137 MMHG | BODY MASS INDEX: 33.2 KG/M2 | OXYGEN SATURATION: 94 % | WEIGHT: 180.4 LBS | TEMPERATURE: 96.6 F | HEIGHT: 62 IN | RESPIRATION RATE: 18 BRPM | DIASTOLIC BLOOD PRESSURE: 87 MMHG | HEART RATE: 60 BPM

## 2024-10-18 DIAGNOSIS — E03.4 ACQUIRED ATROPHY OF THYROID: ICD-10-CM

## 2024-10-18 DIAGNOSIS — Z00.00 MEDICARE ANNUAL WELLNESS VISIT, SUBSEQUENT: Primary | ICD-10-CM

## 2024-10-18 DIAGNOSIS — R73.01 IFG (IMPAIRED FASTING GLUCOSE): ICD-10-CM

## 2024-10-18 DIAGNOSIS — R26.9 NEUROLOGIC GAIT DYSFUNCTION: ICD-10-CM

## 2024-10-18 DIAGNOSIS — E78.2 MIXED HYPERLIPIDEMIA: ICD-10-CM

## 2024-10-18 DIAGNOSIS — E55.9 VITAMIN D DEFICIENCY: ICD-10-CM

## 2024-10-18 DIAGNOSIS — R53.1 GENERALIZED WEAKNESS: ICD-10-CM

## 2024-10-18 DIAGNOSIS — I10 PRIMARY HYPERTENSION: ICD-10-CM

## 2024-10-18 PROBLEM — R30.0 DYSURIA: Status: RESOLVED | Noted: 2024-07-29 | Resolved: 2024-10-18

## 2024-10-18 PROCEDURE — 1159F MED LIST DOCD IN RCRD: CPT | Performed by: INTERNAL MEDICINE

## 2024-10-18 PROCEDURE — 1170F FXNL STATUS ASSESSED: CPT | Performed by: INTERNAL MEDICINE

## 2024-10-18 PROCEDURE — 99214 OFFICE O/P EST MOD 30 MIN: CPT | Performed by: INTERNAL MEDICINE

## 2024-10-18 PROCEDURE — G0439 PPPS, SUBSEQ VISIT: HCPCS | Performed by: INTERNAL MEDICINE

## 2024-10-18 PROCEDURE — 1160F RVW MEDS BY RX/DR IN RCRD: CPT | Performed by: INTERNAL MEDICINE

## 2024-10-18 PROCEDURE — 3075F SYST BP GE 130 - 139MM HG: CPT | Performed by: INTERNAL MEDICINE

## 2024-10-18 PROCEDURE — 3079F DIAST BP 80-89 MM HG: CPT | Performed by: INTERNAL MEDICINE

## 2024-10-18 PROCEDURE — 1125F AMNT PAIN NOTED PAIN PRSNT: CPT | Performed by: INTERNAL MEDICINE

## 2024-10-18 RX ORDER — OXYBUTYNIN CHLORIDE 5 MG/1
TABLET ORAL
Qty: 90 TABLET | Refills: 1 | Status: SHIPPED | OUTPATIENT
Start: 2024-10-18

## 2024-10-18 NOTE — ASSESSMENT & PLAN NOTE
Vitamin D is up from 23 to 45 as of her 10/24 OV.  She stable to continue with low-dose over-the-counter supplementation.

## 2024-10-18 NOTE — ASSESSMENT & PLAN NOTE
Sugar has trended down twice from 6.3 to 6.1 as of her 10/24 OV.  I think were fine just keeping an eye on this every 6 months, certainly no meds needed.   Low Dose Naltrexone Pregnancy And Lactation Text: Naltrexone is pregnancy category C.  There have been no adequate and well-controlled studies in pregnant women.  It should be used in pregnancy only if the potential benefit justifies the potential risk to the fetus.   Limited data indicates that naltrexone is minimally excreted into breastmilk.

## 2024-10-18 NOTE — PROGRESS NOTES
Subjective   The ABCs of the Annual Wellness Visit  Medicare Wellness Visit      Kelsey Durand is a 79 y.o. patient who presents for a Medicare Wellness Visit.    The following portions of the patient's history were reviewed and   updated as appropriate: allergies, current medications, past family history, past medical history, past social history, past surgical history, and problem list.    Compared to one year ago, the patient's physical   health is worse.---> Issues with balance.  Compared to one year ago, the patient's mental   health is the same.    Recent Hospitalizations:  This patient has had a [facility] admission record on file within the last 365 days.  Current Medical Providers:  Patient Care Team:  Chris Elder MD as PCP - General (Internal Medicine)  Denzel Streeter MD as Consulting Physician (Gastroenterology)  Bin Vo DPM as Consulting Physician (Podiatry)    Outpatient Medications Prior to Visit   Medication Sig Dispense Refill    acetaminophen (TYLENOL) 500 MG tablet Take 1 tablet by mouth Every 6 (Six) Hours As Needed for Mild Pain.      clotrimazole (LOTRIMIN) 1 % cream Apply 1 Application topically to the appropriate area as directed 2 (Two) Times a Day. 28 g 1    dicyclomine (BENTYL) 10 MG capsule 1 to 2 capsules 30 minutes prior to meals as needed. 60 capsule 1    escitalopram (LEXAPRO) 5 MG tablet Take 1 tablet by mouth Daily. 90 tablet 1    levothyroxine (SYNTHROID, LEVOTHROID) 100 MCG tablet Take 1 tablet by mouth Every Morning. 90 tablet 3    metoprolol succinate XL (TOPROL-XL) 25 MG 24 hr tablet TAKE 1 TABLET BY MOUTH EVERY DAY 90 tablet 3    multivitamin with minerals tablet tablet Take 1 tablet by mouth Daily.      simvastatin (ZOCOR) 10 MG tablet Take 1 tablet by mouth Every Other Day. 45 tablet 3    fluconazole (Diflucan) 150 MG tablet One tab po every 72 hrs x 3 doses 3 tablet 1     No facility-administered medications prior to visit.     No  "opioid medication identified on active medication list. I have reviewed chart for other potential  high risk medication/s and harmful drug interactions in the elderly.      Aspirin is not on active medication list.  Aspirin use is not indicated based on review of current medical condition/s. Risk of harm outweighs potential benefits.  .    Patient Active Problem List   Diagnosis    Acquired atrophy of thyroid    Mixed hyperlipidemia    Vitamin D deficiency    Primary osteoarthritis involving multiple joints    Recurrent major depression in partial remission    Primary hypertension    Gallstones    Neurologic gait dysfunction    Medicare annual wellness visit, subsequent    Sensory neuropathy    IFG (impaired fasting glucose)    Lone atrial fibrillation    Candidiasis of female genitalia     Advance Care Planning Advance Directive is on file.  ACP discussion was held with the patient during this visit. Patient has an advance directive in EMR which is still valid.             Objective   Vitals:    10/18/24 1113   BP: 137/87   BP Location: Right arm   Patient Position: Sitting   Cuff Size: Large Adult   Pulse: 60   Resp: 18   Temp: 96.6 °F (35.9 °C)   TempSrc: Oral   SpO2: 94%   Weight: 81.8 kg (180 lb 6.4 oz)   Height: 157.5 cm (62.01\")       Estimated body mass index is 32.99 kg/m² as calculated from the following:    Height as of this encounter: 157.5 cm (62.01\").    Weight as of this encounter: 81.8 kg (180 lb 6.4 oz).    BMI is >= 30 and <35. (Class 1 Obesity). The following options were offered after discussion;: exercise counseling/recommendations and nutrition counseling/recommendations       Does the patient have evidence of cognitive impairment? No  Lab Results   Component Value Date    TRIG 82 10/03/2024    HDL 55 10/03/2024    LDL 87 10/03/2024    VLDL 16 10/03/2024    HGBA1C 6.10 (H) 10/03/2024                                                                                               Health  Risk " Assessment    Smoking Status:  Social History     Tobacco Use   Smoking Status Never    Passive exposure: Yes   Smokeless Tobacco Never     Alcohol Consumption:  Social History     Substance and Sexual Activity   Alcohol Use Not Currently       Fall Risk Screen  STEADI Fall Risk Assessment was completed, and patient is at MODERATE risk for falls. Assessment completed on:10/18/2024    Depression Screening:      10/18/2024    11:00 AM   PHQ-2/PHQ-9 Depression Screening   Little interest or pleasure in doing things Not at all   Feeling down, depressed, or hopeless Not at all     Health Habits and Functional and Cognitive Screening:      10/18/2024    11:00 AM   Functional & Cognitive Status   Do you have difficulty preparing food and eating? No   Do you have difficulty bathing yourself, getting dressed or grooming yourself? No   Do you have difficulty using the toilet? No   Do you have difficulty moving around from place to place? Yes   Do you have trouble with steps or getting out of a bed or a chair? Yes   Current Diet Well Balanced Diet   Do you need help using the phone?  No   Are you deaf or do you have serious difficulty hearing?  No   Do you need help to go to places out of walking distance? Yes   Do you need help shopping? No   Do you need help preparing meals?  No   Do you need help with housework?  Yes   Do you need help with laundry? No   Do you need help taking your medications? No   Do you need help managing money? No   Do you ever drive or ride in a car without wearing a seat belt? No   Have you felt unusual stress, anger or loneliness in the last month? No   Do you have difficulty concentrating, remembering or making decisions? No           Age-appropriate Screening Schedule:  Refer to the list below for future screening recommendations based on patient's age, sex and/or medical conditions. Orders for these recommended tests are listed in the plan section. The patient has been provided with a written  plan.    Health Maintenance List  Health Maintenance   Topic Date Due    RSV Vaccine - Adults (1 - 1-dose 75+ series) Never done    TDAP/TD VACCINES (2 - Td or Tdap) 03/13/2024    ANNUAL WELLNESS VISIT  04/24/2024    INFLUENZA VACCINE  08/01/2024    BMI FOLLOWUP  08/24/2024    COVID-19 Vaccine (5 - 2023-24 season) 09/01/2024    COLORECTAL CANCER SCREENING  11/07/2024    URINE MICROALBUMIN  12/20/2024    HEMOGLOBIN A1C  04/03/2025    DXA SCAN  05/11/2025    LIPID PANEL  10/03/2025    Pneumococcal Vaccine 65+  Completed    ZOSTER VACCINE  Completed    DIABETIC EYE EXAM  Discontinued                                                                                                                                                CMS Preventative Services Quick Reference  Risk Factors Identified During Encounter  None Identified  Fall Risk-High or Moderate: Discussed Fall Prevention in the home    The above risks/problems have been discussed with the patient.  Pertinent information has been shared with the patient in the After Visit Summary.  An After Visit Summary and PPPS were made available to the patient.    Follow Up:   Next Medicare Wellness visit to be scheduled in 1 year.     Assessment & Plan  Medicare annual wellness visit, subsequent  AWV completed 10/24.  Patient is having some progressive weakness, particularly of lower extremities, utilizing a rollator nowadays, had a fall or two, but no bad falls, no hospitalizations past year.  She does have a living will, it was on file at the hospital previously, she will get us another copy on return to office.  Primary hypertension  Blood pressure remains well-controlled and her pulse is around 60 as of her 10/24 routine office visit.  She is stable to continue with just low-dose metoprolol   Mixed hyperlipidemia   LDL stable at 87 as of 10/24 OV.  She is just being treated for primary prevention, stable to continue with low-dose simvastatin.  IFG (impaired fasting  glucose)  Sugar has trended down twice from 6.3 to 6.1 as of her 10/24 OV.  I think were fine just keeping an eye on this every 6 months, certainly no meds needed.  Acquired atrophy of thyroid  TSH is stable at 1.5, patient can continue with 100 mcg daily as of 10/24.  Vitamin D deficiency  Vitamin D is up from 23 to 45 as of her 10/24 OV.  She stable to continue with low-dose over-the-counter supplementation.             Follow Up:   Return in about 6 months (around 4/18/2025).

## 2024-10-18 NOTE — ASSESSMENT & PLAN NOTE
LDL stable at 87 as of 10/24 OV.  She is just being treated for primary prevention, stable to continue with low-dose simvastatin.

## 2024-10-18 NOTE — ASSESSMENT & PLAN NOTE
Blood pressure remains well-controlled and her pulse is around 60 as of her 10/24 routine office visit.  She is stable to continue with just low-dose metoprolol

## 2024-10-18 NOTE — PROGRESS NOTES
Chief Complaint  Hypertension (Patient is here for a 6 month follow up, lab work follow up. Patient states theres some days where she has extreme leg and feet pain and feeling unsteady more than usual )    Subjective          Kelsey Durand presents to [facility]     History of Present Illness:  Patient is a pleasant 79-year-old female with underlying hyperlipidemia, hypothyroidism, IFG, among others, who is coming in 10/24 for her routine 6-month follow-up.  We will review her medication list, go over her labs in detail, and address other concerns at that time. (In apartment now 10/24, has lady helping with cleaning).    ---> seen 11/23 for Hospitial follow-up/TCM:  COVID-19 infection  Paroxysmal atrial fibrillation/flutter, new diagnosis  Shortness of breath secondary to COVID-19 pneumonia  Hypokalemia, new  --Patient was found to be COVID-19 positive.  The hospital service contact for further evaluation and management.  Patient started on Paxlovid.  Telemetry review did show patient to have paroxysmal atrial fibrillation/flutter.  Patient was treated with therapeutic Lovenox during hospitalization, discussion was had with patient regarding anticoagulation at discharge and patient states she wishes to hold off on starting medication and wishes to discuss further with her PCP after discharge.      Review of Systems   Constitutional:  Negative for appetite change, fatigue and fever.   HENT:  Negative for congestion and ear pain.    Eyes:  Negative for blurred vision.   Respiratory:  Negative for cough, chest tightness, shortness of breath and wheezing.    Cardiovascular:  Negative for chest pain, palpitations and leg swelling.   Gastrointestinal:  Negative for abdominal pain.   Genitourinary:  Negative for difficulty urinating, dysuria and hematuria.   Musculoskeletal:  Negative for arthralgias and gait problem.   Skin:  Negative for skin lesions.   Neurological:  Negative  "for syncope, memory problem and confusion.   Psychiatric/Behavioral:  Negative for self-injury and depressed mood.        Objective   Vital Signs:   /87 (BP Location: Right arm, Patient Position: Sitting, Cuff Size: Large Adult)   Pulse 60   Temp 96.6 °F (35.9 °C) (Oral)   Resp 18   Ht 157.5 cm (62.01\")   Wt 81.8 kg (180 lb 6.4 oz)   SpO2 94%   BMI 32.99 kg/m²           Physical Exam  Vitals and nursing note reviewed.   Constitutional:       General: She is not in acute distress.     Appearance: Normal appearance. She is not toxic-appearing.   HENT:      Head: Atraumatic.      Right Ear: External ear normal.      Left Ear: External ear normal.      Nose: Nose normal.      Mouth/Throat:      Mouth: Mucous membranes are moist.   Eyes:      General:         Right eye: No discharge.         Left eye: No discharge.      Extraocular Movements: Extraocular movements intact.      Pupils: Pupils are equal, round, and reactive to light.   Cardiovascular:      Rate and Rhythm: Normal rate and regular rhythm.      Pulses: Normal pulses.      Heart sounds: Normal heart sounds. No murmur heard.     No gallop.   Pulmonary:      Effort: Pulmonary effort is normal. No respiratory distress.      Breath sounds: No wheezing, rhonchi or rales.   Abdominal:      General: There is no distension.      Palpations: Abdomen is soft. There is no mass.      Tenderness: There is no abdominal tenderness. There is no guarding.   Musculoskeletal:         General: No swelling or tenderness.      Cervical back: No tenderness.      Right lower leg: No edema.      Left lower leg: No edema.   Skin:     General: Skin is warm and dry.      Findings: No rash.   Neurological:      General: No focal deficit present.      Mental Status: She is alert and oriented to person, place, and time. Mental status is at baseline.      Motor: No weakness.      Gait: Gait normal.   Psychiatric:         Mood and Affect: Mood normal.         Thought Content: " Thought content normal.          Result Review :   The following data was reviewed by: Chris Elder MD on 10/18/2021:                  Assessment and Plan    Diagnoses and all orders for this visit:    1. Medicare annual wellness visit, subsequent (Primary)  Assessment & Plan:  AWV completed 10/24.  Patient is having some progressive weakness, particularly of lower extremities, utilizing a rollator nowadays, had a fall or two, but no bad falls, no hospitalizations past year.  She does have a living will, it was on file at the hospital previously, she will get us another copy on return to office.      2. Primary hypertension  Assessment & Plan:  Blood pressure remains well-controlled and her pulse is around 60 as of her 10/24 routine office visit.  She is stable to continue with just low-dose metoprolol       3. Mixed hyperlipidemia  Assessment & Plan:   LDL stable at 87 as of 10/24 OV.  She is just being treated for primary prevention, stable to continue with low-dose simvastatin.      4. IFG (impaired fasting glucose)  Assessment & Plan:  Sugar has trended down twice from 6.3 to 6.1 as of her 10/24 OV.  I think were fine just keeping an eye on this every 6 months, certainly no meds needed.      5. Acquired atrophy of thyroid  Assessment & Plan:  TSH is stable at 1.5, patient can continue with 100 mcg daily as of 10/24.      6. Vitamin D deficiency  Assessment & Plan:  Vitamin D is up from 23 to 45 as of her 10/24 OV.  She stable to continue with low-dose over-the-counter supplementation.      7. Neurologic gait dysfunction  -     Ambulatory Referral to Physical Therapy for Evaluation & Treatment    8. Generalized weakness  -     Ambulatory Referral to Physical Therapy for Evaluation & Treatment    Other orders  -     oxybutynin (DITROPAN) 5 MG tablet; Take 1 tablet every evening, may increase until taking 3 tablets every evening if needed.  Dispense: 90 tablet; Refill: 1             --  --  OLDER NOTES:  VISIT  "4/21:  ANNUAL MEDICARE WELLNESS PHYSICAL 4/19 = reviewed all forms in office with pt; no new discoveries; active at mall=class and walks/ bike at home.  H.M. ISSUES:  BMI 30 and d/w watch carbs and walk.  --  \"HYPERTENSION\" controlled=better at home='s (off meds good while)---> d/w check occ and let us know=fine at home as of 4/21.    LIPIDS at goal with LDL 68...101 so f/u before change...93 fine---> 80 stable 4/21.  --  HYPOTHYROIDISM stable 10/16 with TSH 0.2 still=on same dose many years---> fine 4/21.  IFG mild/stable = FBS 92 with A1C 6.0---> 6.3 in 4/20---> 6.0 in 4/21.  --  ANXIETY D/O 1/19 and has benzo to use PRN; d/w call if feels she needs to use it too often and will add SSRI...cheerful 10/19...will use low dose lexapro as of 4/20 OV---> seems better 10/20.  B12 BUP=996 in 4/20.  --  SYNCOPE is ? seizure per Dr Valadez/Flash in [facility] over the weekend, so will get EEG (tongue bite/incontinent)...EEG was neg 9/15.  --  BMD 5/17 = spine 0.9, hip 0.6 is great---> BMD 7/20 = spine 1.0, hip -0.3 = great!!  VIT D DEF remains stable on 2000 qd...28 and d/w take routinely please...44---> same 10/20.  --  DJD no new c/o.  R KNEE PAIN with no trauma, comes and goes, exam is w/o laxity, so agree with repeat mobic and use brace and then PTA if no better.  --  A.R. no flare.  --  --  MMG 5/4/2023 per Dr Lawler/Fabiola Pacheco (S+)  COLON 11/19...polyp/FH+ = daughter rectal ca = 5 years per Dr. Streeter  Adacel 3/14, Prevnar '16; Pneumovax #1 10/17; Hep A x2 10/18; d/w Shingrix 10/18;   ( Samuel 7/13, 2 girls here=Leigh Ann (no kids) and Xiao = youngest is boy in 9th grade 12/22 and girl with Asperger's is at [facility]).    Follow Up   Return in about 6 months (around 4/18/2025).  Patient was given instructions and counseling regarding her condition or for health maintenance advice. Please see specific information pulled into the AVS if appropriate.       Answers submitted by the patient for this visit:  Other " (Submitted on 10/17/2024)  Please describe your symptoms.: Have to pee quite often. Get up 3-4 times a night. I have bladder leak . A lot of times i dont make it to the bathroom. Im prepared ., , Everything hurts . My legs, arms . I want to fall backwards. I get off balanced.  I moved tk a duplex with laminate floors and can’t walk without walker . Was using a cane. More comfortable with walker. Have falling . Scooted around to other sude of kitchen so i can pull myself up. , Fell backwards at foot of bed and landed on foot of bed frame  Have you had these symptoms before?: Yes  How long have you been having these symptoms?: Greater than 2 weeks  Please list any medications you are currently taking for this condition.: Tylenol  Please describe any probable cause for these symptoms. : Balance. Use a walker in my duplex., Not comfortable walking on laminate floors., Also have area rugs., Not comfortable with cane. I was at first  Primary Reason for Visit (Submitted on 10/17/2024)  What is the primary reason for your visit?: Problem Not Listed

## 2024-10-18 NOTE — ASSESSMENT & PLAN NOTE
JUSTIN completed 10/24.  Patient is having some progressive weakness, particularly of lower extremities, utilizing a rollator nowadays, had a fall or two, but no bad falls, no hospitalizations past year.  She does have a living will, it was on file at the hospital previously, she will get us another copy on return to office.

## 2024-11-07 ENCOUNTER — CLINICAL SUPPORT (OUTPATIENT)
Dept: GASTROENTEROLOGY | Facility: CLINIC | Age: 79
End: 2024-11-07
Payer: MEDICARE

## 2024-11-07 ENCOUNTER — PREP FOR SURGERY (OUTPATIENT)
Dept: OTHER | Facility: HOSPITAL | Age: 79
End: 2024-11-07
Payer: MEDICARE

## 2024-11-07 DIAGNOSIS — Z80.0 FAMILY HISTORY OF COLON CANCER: ICD-10-CM

## 2024-11-07 DIAGNOSIS — Z86.0100 HISTORY OF COLON POLYPS: ICD-10-CM

## 2024-11-07 DIAGNOSIS — Z12.11 COLON CANCER SCREENING: Primary | ICD-10-CM

## 2024-11-07 RX ORDER — SODIUM, POTASSIUM,MAG SULFATES 17.5-3.13G
1 SOLUTION, RECONSTITUTED, ORAL ORAL EVERY 12 HOURS
Qty: 354 ML | Refills: 0 | Status: SHIPPED | OUTPATIENT
Start: 2024-11-07

## 2024-11-07 NOTE — PROGRESS NOTES
Kelsey Durand  1945  79 y.o.    Reason for call: 5 year recall - LAST COLON 11/2019 KM, HX OF COLON POLYPS, FM HX OF COLON CANCER  Prep prescribed: Suprep  Prep instructions reviewed with patient and sent to patient via "[employer]  Is the patient currently on any injectable or oral medications for weight loss or diabetes? No  Clearance needed? No  If yes, what clearance is needed? N/A  Clearance has been requested from N/A  The patient has been scheduled for: Colonoscopy    Kelsey Durand is aware they have been scheduled for a screening colonoscopy. Patient has expressed they are not having any symptoms at all.     After your procedure, you will be contacted with results. Please confirm the best phone # to reach the patient: [phone]  Family history of colon cancer? Yes  If yes, indicate relative: DAUGHTER  Tentative Procedure Date: 1/15/2025    Date/Place of last Scope: 11/2019, [facility]  Able to obtain report? yes    If colon screening and over the age of 75: The decision for colonoscopy is individualized. The risks of colonoscopy are: bleeding, perforation, splenic rupture, and missing a polyp. Patient is aware of this information and verbalizes that they want to proceed with the procedure: Yes      Family History   Problem Relation Age of Onset    Breast cancer Sister     Colon cancer Daughter      Past Medical History:   Diagnosis Date    Allergic rhinitis due to pollen     Atrophy of thyroid (acquired)     Chronic fatigue     Colitis     Difficulty walking 2018    Disease of thyroid gland     DM (diabetes mellitus)     Foot pain, right     Fracture, foot Apr 4 2022    Fell in yard Mon morning. Sorta loss balance. 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    Thyroid nodule     Unspecified  osteoarthritis, unspecified site     Vitamin D deficiency, unspecified      Allergies   Allergen Reactions    Sulfa Antibiotics Rash and Hives     Past Surgical History:   Procedure Laterality Date    CATARACT EXTRACTION      12/14/2011 Left Cataract Surgery 11/30/2011 right eye cataract surgery    COLONOSCOPY      11/2020 colonoscopy    LEEP  2000    OTHER SURGICAL HISTORY      FNA     Social History     Socioeconomic History    Marital status:    Tobacco Use    Smoking status: Never     Passive exposure: Yes    Smokeless tobacco: Never   Vaping Use    Vaping status: Never Used   Substance and Sexual Activity    Alcohol use: Not Currently     Comment: rarely    Drug use: Never    Sexual activity: Not Currently     Partners: Male     Birth control/protection: Condom, Pill, None, Birth control pill     Comment: I am 77 years old! why!       Current Outpatient Medications:     acetaminophen (TYLENOL) 500 MG tablet, Take 1 tablet by mouth Every 6 (Six) Hours As Needed for Mild Pain., Disp: , Rfl:     clotrimazole (LOTRIMIN) 1 % cream, Apply 1 Application topically to the appropriate area as directed 2 (Two) Times a Day., Disp: 28 g, Rfl: 1    dicyclomine (BENTYL) 10 MG capsule, 1 to 2 capsules 30 minutes prior to meals as needed., Disp: 60 capsule, Rfl: 1    escitalopram (LEXAPRO) 5 MG tablet, Take 1 tablet by mouth Daily., Disp: 90 tablet, Rfl: 1    levothyroxine (SYNTHROID, LEVOTHROID) 100 MCG tablet, Take 1 tablet by mouth Every Morning., Disp: 90 tablet, Rfl: 3    metoprolol succinate XL (TOPROL-XL) 25 MG 24 hr tablet, TAKE 1 TABLET BY MOUTH EVERY DAY, Disp: 90 tablet, Rfl: 3    multivitamin with minerals tablet tablet, Take 1 tablet by mouth Daily., Disp: , Rfl:     oxybutynin (DITROPAN) 5 MG tablet, Take 1 tablet every evening, may increase until taking 3 tablets every evening if needed., Disp: 90 tablet, Rfl: 1    simvastatin (ZOCOR) 10 MG tablet, Take 1 tablet by mouth Every Other Day., Disp: 45 tablet,  Rfl: 3

## 2024-11-12 ENCOUNTER — TREATMENT (OUTPATIENT)
Dept: PHYSICAL THERAPY | Facility: CLINIC | Age: 79
End: 2024-11-12
Payer: MEDICARE

## 2024-11-12 DIAGNOSIS — R26.89 IMBALANCE: ICD-10-CM

## 2024-11-12 DIAGNOSIS — R26.2 DIFFICULTY WALKING: Primary | ICD-10-CM

## 2024-11-12 DIAGNOSIS — R53.1 WEAKNESS GENERALIZED: ICD-10-CM

## 2024-11-12 PROCEDURE — 97530 THERAPEUTIC ACTIVITIES: CPT

## 2024-11-12 PROCEDURE — 97161 PT EVAL LOW COMPLEX 20 MIN: CPT

## 2024-11-12 NOTE — PROGRESS NOTES
"  Physical Therapy Initial Evaluation and Plan of Care                    Y[facility] [address] [place] [address]    Patient: Kelsey Durand   : 1945  Diagnosis/ICD-10 Code:  Difficulty walking [R26.2]  Referring practitioner: Chris Elder MD  Date of Initial Visit: 2024  Today's Date: 2024  Patient seen for 1 sessions           Subjective Questionnaire: ABC scale: 52.5% confidence (answer questions WITH using walker)           Subjective Evaluation    History of Present Illness  Mechanism of injury: Pt presents to therapy with complaints of imbalance and difficulty walking. Pt reports that she has this thing where she kind of propels backwards. \"I don't know what causes that.\" Pt has fallen once in the past month. She does not know what happened. She went down sideways and hit the floor. Pt thinks she has fallen 6 times in the past year. She is able to get up on her own if she has something to hold onto. Pt has difficulty walking on laminate and hardwood with her cane in her home, so she uses her metal walker instead in the home. She uses her rolator when she goes out of her house. Pt is still driving, but she does not do long trips anymore. Pt is indep with dressing. She is indep with putting her shoes on as long as they are slip-ons. Pt reports that she does not wear socks.     Pt lives in a one-level house. She goes in through the garage where there are no steps to enter. Pt does have a shower chair, but she does not use it because once she sits down, she \"cannot get back up.\" Pt does have grab bars in her shower.     Pt reports that she pees a lot. She has to wear depends at night. She has been given medicine for this, but she did not like the medicine because it made her mouth really dry. She forgot to wear her depends last night and she woke up at about 3AM today with the bed wet.     Pt does not do exercises at home.     PMH: osteoarthritis, DM, HTN, hypothyroid, " hyperlipidemia    Goals: better balance, better walking, be more safe.    Treatments  Previous treatment: physical therapy  Patient Goals  Patient goals for therapy: increased motion, improved balance, decreased pain, increased strength, independence with ADLs/IADLs and return to sport/leisure activities             Objective          Strength/Myotome Testing     Left Hip   Planes of Motion   Flexion: 4-  Abduction: 4+ (sitting)  Adduction: 4+ (sitting)    Right Hip   Planes of Motion   Flexion: 4-  Abduction: 4+ (sitting)  Adduction: 4+ (sitting)    Left Knee   Flexion: 5  Extension: 5    Right Knee   Flexion: 4  Extension: 4+    Left Ankle/Foot   Dorsiflexion: 5    Right Ankle/Foot   Dorsiflexion: 4+    Functional Assessment     Comments  5xSTS: 26.39 seconds (use of B arms on chair, pt favors R leg, pt barely able to stand up straight before falling back into chair).    TU.64 seconds with use of rolator (use of arms on chair)    2 min walk: 222.6 feet with rolator     Static stance with feet apart: 8 seconds       See Exercise, Manual, and Modality Logs for complete treatment.     Assessment & Plan       Assessment  Impairments: abnormal coordination, abnormal gait, activity intolerance, impaired balance, lacks appropriate home exercise program and safety issue   Functional limitations: sleeping, walking, sitting, standing and unable to perform repetitive tasks   Assessment details: Pt presents to therapy with complaints of imbalance and of history of falls. Pt was found to have weak LE's today and decreased LE endurance and agility. Scores on the 5xSTS test, TUG test, and 2 minute walk test all indicate that pt is at a high risk for falls. Pt has extreme difficulty with simply standing without UE support. Skilled therapy is required in order to address the aforementioned impairments to decrease pt's falls risk and allow her increased independence with ambulation.       Goals  Plan Goals:     1. Mobility:  Walking/Moving Around Functional Limitation     LTG 1: 12 weeks:  The patient will demonstrate TUG score <17 seconds (with her rolator) for reduced fall risk.     STATUS:  New   STG 1a: 6 weeks:  The patient will demonstrate TUG score <25 seconds (with her rolator) for reduced fall risk.     STATUS:  New    2. The patient has limited strength of the B hips.   LTG 2a: 12 weeks:  The patient will demonstrate 4+ /5 strength for B hip flexion, in order to improve hip stability for gait and decreased falls risk.    STATUS:  New    3. The patient has limited LE strength and endurance   LTG 3: 12 weeks:  The patient will perform the 5xSTS test in 19 seconds or less, with the use of arms on chair, in order to indicate improved LE strength and endurance so that she has increased ease with transfers in her home.     STATUS:  New   STG 3a: The patient will perform the 5xSTS test in 25 seconds or less, with the use of arms on chair, in order to indicate improved LE strength and endurance so that she has increased ease with transfers in her home.     STATUS:  New      4. The patient has difficulty with balance.    LTG 4: 12 weeks: The patient will hold the romberg position on floor with eyes open for 30 seconds in order to improve balance and decrease risk of falling.     STATUS: New    STG 4: 6 weeks: The patient will hold the feet apart position on floor with eyes open for 30 seconds in order to improve balance and decrease risk of falling.     STATUS: New        5. Mobility: Walking/Moving Around Functional Limitation     LTG 5: 12 weeks: The pt will ambulate 450 feet or more during the 2 minute walk test with the use of her rolator, in order to indicate improved LE endurance so that community ambulation is easier and safer for her.     STATUS:  New   STG 5a: 6 weeks: The pt will ambulate 270 feet or more during the 2 minute walk test with the use of her rolator, in order to indicate improved LE endurance so that community  ambulation is easier and safer for her.     STATUS:  New      LTG 5b: 12 weeks: The pt will ambulate 100 feet or more wither her cane and with CGA from the therapist, in order to indicate improved gait, so that she has increased efficiency with ambulating in her home and in the community.    STATUS:  New          Plan  Therapy options: will be seen for skilled therapy services  Planned modality interventions: cryotherapy, dry needling, electrical stimulation/Russian stimulation, traction and thermotherapy (hydrocollator packs)  Planned therapy interventions: therapeutic activities, neuromuscular re-education, manual therapy, gait training, home exercise program, strengthening, stretching, balance/weight-bearing training and fine motor coordination training  Other planned therapy interventions: Canalith Repositioning maneuvers  Frequency: 3x week  Duration in weeks: 12  Treatment plan discussed with: patient        Visit Diagnoses:    ICD-10-CM ICD-9-CM   1. Difficulty walking  R26.2 719.7   2. Weakness generalized  R53.1 780.79   3. Imbalance  R26.89 781.2       History # of Personal Factors and/or Comorbidities: MODERATE (1-2)  Examination of Body System(s): # of elements: LOW (1-2)  Clinical Presentation: STABLE   Clinical Decision Making: LOW       Timed:         Manual Therapy:    0     mins  38348;     Therapeutic Exercise:    0     mins  64529;     Neuromuscular Tammy:    0    mins  50360;    Therapeutic Activity:     8     mins  71641;     Gait Trainin     mins  37031;     Ultrasound:     0     mins  42631;    Ionto                               0    mins   05822  Self Care                       0     mins   39655  Canalith Repos    0     mins 59999      Un-Timed:  Electrical Stimulation:    0     mins  55250 ( );  Dry Needling     0     mins self-pay  Traction     0     mins 37881  Low Eval     40     Mins  47748  Mod Eval     0     Mins  99594  High Eval                       0     Mins   62613  Re-Eval                           0    mins  48554    Timed Treatment:   8   mins   Total Treatment:     48   mins    PT SIGNATURE: Cheri Long PT    Electronically signed 11/12/2024    [place] License: PT - [id number]      Initial Certification  Certification Period: 11/12/2024 thru 2/9/2025  I certify that the therapy services are furnished while this patient is under my care.  The services outlined above are required by this patient, and will be reviewed every 90 days.     PHYSICIAN: Chris Elder MD  NPI: [id number]      DATE:     Please sign and return via fax to [phone]. Thank you, [facility].

## 2024-11-20 ENCOUNTER — TREATMENT (OUTPATIENT)
Dept: PHYSICAL THERAPY | Facility: CLINIC | Age: 79
End: 2024-11-20
Payer: MEDICARE

## 2024-11-20 DIAGNOSIS — R26.89 IMBALANCE: ICD-10-CM

## 2024-11-20 DIAGNOSIS — R53.1 WEAKNESS GENERALIZED: ICD-10-CM

## 2024-11-20 DIAGNOSIS — R26.2 DIFFICULTY WALKING: Primary | ICD-10-CM

## 2024-11-20 PROCEDURE — 97110 THERAPEUTIC EXERCISES: CPT

## 2024-11-20 PROCEDURE — 97112 NEUROMUSCULAR REEDUCATION: CPT

## 2024-11-20 NOTE — PROGRESS NOTES
"Physical Therapy Daily Treatment Note  [facility]NET[facility] [address] [place] [address]    Patient: Kelsey Durand   : 1945  Referring practitioner: Chris Elder MD  Date of Initial Visit: Type: THERAPY  Noted: 2024  Today's Date: 2024  Patient seen for 2 sessions           Subjective  Kelsey Durand reports:  she has not performed her HEP. \"My L knee hurts, 1-2/10. And then I have a little throbbing on the outside of the L ankle.\"    Objective Kelsey noted to lean heavy to the R, encouraged to even herself up by placing equal wgt B. Kelsey noted to be fearful during gait and balance tasks, with PTA observing her to jerk 3 times, commenting that is what she does as is feels as though she is falling.    see Exercise, Manual, and Modality Logs for complete treatment.     Assessment/Plan  This is Kelsey's first follow up appt to attend to deficits outlined in IE. She requires ongoing therapist directed intervention to attain best possible outcome.    Visit Diagnoses:    ICD-10-CM ICD-9-CM   1. Difficulty walking  R26.2 719.7   2. Weakness generalized  R53.1 780.79   3. Imbalance  R26.89 781.2       Progress per Plan of Care and Progress strengthening /stabilization /functional activity       Timed:  Manual Therapy:         mins  13219;  Therapeutic Exercise:    14     mins  82979;     Neuromuscular Tammy:    14    mins  35297;    Therapeutic Activity:          mins  97452;     Gait Training:           mins  05889;     Ultrasound:          mins  78133;    Electrical Stimulation:         mins  84249 ( );  Aquatics  __   mins   97246    Untimed:  Electrical Stimulation:         mins  98912 ( );  Mechanical Traction:         mins  54280;     Timed Treatment:   28   mins   Total Treatment:     28   mins    Electronically Signed:  Virginie Ramirez PTA  Physical Therapist Assistant    [place] PTA license [id number]            "

## 2024-11-22 ENCOUNTER — TREATMENT (OUTPATIENT)
Dept: PHYSICAL THERAPY | Facility: CLINIC | Age: 79
End: 2024-11-22
Payer: MEDICARE

## 2024-11-22 DIAGNOSIS — R26.2 DIFFICULTY WALKING: Primary | ICD-10-CM

## 2024-11-22 DIAGNOSIS — R53.1 WEAKNESS GENERALIZED: ICD-10-CM

## 2024-11-22 DIAGNOSIS — R26.89 IMBALANCE: ICD-10-CM

## 2024-11-22 PROCEDURE — 97530 THERAPEUTIC ACTIVITIES: CPT

## 2024-11-22 PROCEDURE — 97110 THERAPEUTIC EXERCISES: CPT

## 2024-11-22 NOTE — PROGRESS NOTES
[facility]                              Physical Therapy Daily Treatment Note    Patient: Kelsey Durand   : 1945  Diagnosis/ICD-10 Code:  Difficulty walking [R26.2]  Referring practitioner: Chris Elder MD  Date of Initial Visit: Type: THERAPY  Noted: 2024  Today's Date: 2024  Patient seen for 3 sessions           Subjective   Kelsey Durand reports: that she just wants her balance to get better so she isn't so fearful when she is getting around her house.      Objective     See Exercise, Manual, and Modality Logs for complete treatment.     Assessment/Plan  Patient did well with STS and HHA. Pt did not pull up with her UE, she just used PTA's hands as stability. Pt will benefit from continued skilled PT services to address strength and imbalance deficits for safety to help improve upon functional daily activities.     Progress per Plan of Care         Timed:  Manual Therapy:         mins  29715;  Therapeutic Exercise:    17     mins  89763;     Neuromuscular Tammy:        mins  98123;    Therapeutic Activity:     10     mins  60702;     Gait Training:           mins  00482;        Untimed:  Electrical Stimulation:         mins  55533 ( );  Mechanical Traction:         mins  43423;       Timed Treatment:   27   mins   Total Treatment:     27   mins      Electronically signed:     Laney Thompson PTA  Physical Therapist Assistant  [facility] License #: [id number]

## 2024-11-27 ENCOUNTER — TREATMENT (OUTPATIENT)
Dept: PHYSICAL THERAPY | Facility: CLINIC | Age: 79
End: 2024-11-27
Payer: MEDICARE

## 2024-11-27 DIAGNOSIS — R26.89 IMBALANCE: ICD-10-CM

## 2024-11-27 DIAGNOSIS — R26.2 DIFFICULTY WALKING: Primary | ICD-10-CM

## 2024-11-27 DIAGNOSIS — R53.1 WEAKNESS GENERALIZED: ICD-10-CM

## 2024-11-27 PROCEDURE — 97116 GAIT TRAINING THERAPY: CPT

## 2024-11-27 PROCEDURE — 97530 THERAPEUTIC ACTIVITIES: CPT

## 2024-11-27 NOTE — PROGRESS NOTES
Physical Therapy Daily Treatment Note                      [place] [facility] [address] [facility] [place] [address]    Patient: Kelsey Durand   : 1945  Diagnosis/ICD-10 Code:  Difficulty walking [R26.2]  Referring practitioner: Chris Elder MD  Date of Initial Visit: Type: THERAPY  Noted: 2024  Today's Date: 2024  Patient seen for 4 sessions           Subjective 11:08-11:31  The patient reported that she walked around her house with a cane yesterday, which went okay.     Objective     Pt was 8 minutes late to session     See Exercise, Manual, and Modality Logs for complete treatment.     Assessment/Plan  Pt tolerated today's session well. Pt was able to perform STS today without actual use of her UE's, which is an improvement, but she did place her UE's on her thighs. Pt is very afraid of falling and was consistently reaching out to grab the therapist when performing STS. Pt had to be cued to take big steps during ambulation with a cane (and HHA in the other hand). Pt was able to perform decent tandem walking, but required one hand to be on the support surface. Skilled therapy still required in order to improve balance and gait and to decrease risk for falls. Continue POC.        Timed:  Manual Therapy:    0     mins  70919;  Therapeutic Exercise:    0     mins  29424;     Neuromuscular Tammy:   0    mins  44932;    Therapeutic Activity:     15     mins  51279;     Gait Trainin     mins  68278;     Aquatics                         0      mins  53428    Un-timed:  Mechanical Traction      0     mins  22536  Dry Needling     0     mins self-pay  Electrical Stimulation:    0     mins  81532 ( );      Timed Treatment:   23   mins   Total Treatment:     23   mins    Cheri Long PT    Electronically signed 2024    [place] License: PT - [id number]

## 2024-12-04 ENCOUNTER — TREATMENT (OUTPATIENT)
Dept: PHYSICAL THERAPY | Facility: CLINIC | Age: 79
End: 2024-12-04
Payer: MEDICARE

## 2024-12-04 DIAGNOSIS — R26.2 DIFFICULTY WALKING: Primary | ICD-10-CM

## 2024-12-04 DIAGNOSIS — R53.1 WEAKNESS GENERALIZED: ICD-10-CM

## 2024-12-04 DIAGNOSIS — R26.89 IMBALANCE: ICD-10-CM

## 2024-12-04 PROCEDURE — 97110 THERAPEUTIC EXERCISES: CPT

## 2024-12-04 PROCEDURE — 97112 NEUROMUSCULAR REEDUCATION: CPT

## 2024-12-04 NOTE — PROGRESS NOTES
"Physical Therapy Daily Treatment Note  [place] [facility] [address] [place] [address]    Patient: Kelsey Durand   : 1945  Referring practitioner: Chris Elder MD  Date of Initial Visit: Type: THERAPY  Noted: 2024  Today's Date: 2024  Patient seen for 5 sessions           Subjective  Kelsey Durand reports:  she does have diminished sensation in her feet and legs. She has worn compression stockings in the past. \"I have been running all over today, kind of tired.\"    Objective   See Exercise, Manual, and Modality Logs for complete treatment.     Assessment/Plan  Question of existing peripheral neuropathy as to cause of balance deficits. She still feels as if she will fall and is observed to have rocking fwd/retro as she performs STS. Kelsey advanced with resistance utilized during session.     Visit Diagnoses:    ICD-10-CM ICD-9-CM   1. Difficulty walking  R26.2 719.7   2. Weakness generalized  R53.1 780.79   3. Imbalance  R26.89 781.2       Progress per Plan of Care and Progress strengthening /stabilization /functional activity       Timed:  Manual Therapy:         mins  75133;  Therapeutic Exercise:    17     mins  28734;     Neuromuscular Tammy:    13    mins  28513;    Therapeutic Activity:          mins  82040;     Gait Training:           mins  59619;     Ultrasound:          mins  14176;    Electrical Stimulation:         mins  22577 ( );  Aquatics  __   mins   26067    Untimed:  Electrical Stimulation:         mins  96561 ( );  Mechanical Traction:         mins  30995;     Timed Treatment:   30   mins   Total Treatment:        mins    Electronically Signed:  Virginie Ramirez PTA  Physical Therapist Assistant    [place] PTA license [id number]            "

## 2024-12-05 ENCOUNTER — TREATMENT (OUTPATIENT)
Dept: PHYSICAL THERAPY | Facility: CLINIC | Age: 79
End: 2024-12-05
Payer: MEDICARE

## 2024-12-05 DIAGNOSIS — R26.2 DIFFICULTY WALKING: Primary | ICD-10-CM

## 2024-12-05 DIAGNOSIS — R26.89 IMBALANCE: ICD-10-CM

## 2024-12-05 DIAGNOSIS — R53.1 WEAKNESS GENERALIZED: ICD-10-CM

## 2024-12-05 PROCEDURE — 97530 THERAPEUTIC ACTIVITIES: CPT

## 2024-12-05 PROCEDURE — 97112 NEUROMUSCULAR REEDUCATION: CPT

## 2024-12-05 NOTE — PROGRESS NOTES
Physical Therapy Daily Treatment Note                      [place] [facility] [address] [facility] [place] [address]    Patient: Kelsey Durand   : 1945  Diagnosis/ICD-10 Code:  Difficulty walking [R26.2]  Referring practitioner: Chris Elder MD  Date of Initial Visit: Type: THERAPY  Noted: 2024  Today's Date: 2024  Patient seen for 6 sessions           Subjective   The patient reported no new complaints.    Objective   See Exercise, Manual, and Modality Logs for complete treatment.     Assessment/Plan  Pt tolerated today's session well. Pt's form and confidence during STS has already greatly improved compared to 2 sessions ago. She still required support at back of knees. Pt greatly struggled with static stance on foam. She exhibited severe forward lean even though she thought she was tilting backwards. She tried to take her hands off the counter a couple of times, but had to immediately place them back down. Skilled therapy still required in order to improve balance and gait and to decrease risk for falls. Continue POC.        Timed:  Manual Therapy:    0     mins  14905;  Therapeutic Exercise:    0     mins  00159;     Neuromuscular Tammy:   10    mins  73264;    Therapeutic Activity:     17     mins  61192;     Gait Trainin     mins  45279;     Aquatics                         0      mins  97669    Un-timed:  Mechanical Traction      0     mins  58169  Dry Needling     0     mins self-pay  Electrical Stimulation:    0     mins  59008 ( );      Timed Treatment:   27   mins   Total Treatment:     27   mins    Cheri Long PT    Electronically signed 2024    [place] License: PT - [id number]

## 2024-12-10 ENCOUNTER — TREATMENT (OUTPATIENT)
Dept: PHYSICAL THERAPY | Facility: CLINIC | Age: 79
End: 2024-12-10
Payer: MEDICARE

## 2024-12-10 DIAGNOSIS — R53.1 WEAKNESS GENERALIZED: ICD-10-CM

## 2024-12-10 DIAGNOSIS — R26.2 DIFFICULTY WALKING: Primary | ICD-10-CM

## 2024-12-10 DIAGNOSIS — R26.89 IMBALANCE: ICD-10-CM

## 2024-12-10 PROCEDURE — 97110 THERAPEUTIC EXERCISES: CPT

## 2024-12-10 PROCEDURE — 97530 THERAPEUTIC ACTIVITIES: CPT

## 2024-12-10 NOTE — PROGRESS NOTES
[facility]                              Physical Therapy Daily Treatment Note    Patient: Kelsey Durand   : 1945  Diagnosis/ICD-10 Code:  Difficulty walking [R26.2]  Referring practitioner: Chris Elder MD  Date of Initial Visit: Type: THERAPY  Noted: 2024  Today's Date: 12/10/2024  Patient seen for 7 sessions           Subjective   Kelsey Durand reports: no complaints at time of therapy.       Objective     See Exercise, Manual, and Modality Logs for complete treatment.     Assessment/Plan  Pt's strength has improved with increase in height for step ups. Pt previously doing 4 inch step ups and now 6 inch step up with hand support on counter. Pt CGA during that exercises for safety. Will continue to progress patient per POC and patient tolerance to improve balance and gait to decrease fall risk.     Progress per Plan of Care         Timed:  Manual Therapy:         mins  31158;  Therapeutic Exercise:    15     mins  27059;     Neuromuscular Tammy:        mins  18153;    Therapeutic Activity:     15     mins  75436;     Gait Training:           mins  94851;        Untimed:  Electrical Stimulation:         mins  68186 ( );  Mechanical Traction:         mins  29918;       Timed Treatment:   30   mins   Total Treatment:     30   mins      Electronically signed:     Laney Thompson PTA  Physical Therapist Assistant  \[facility]\"" License #: [id number]

## 2024-12-12 ENCOUNTER — TREATMENT (OUTPATIENT)
Dept: PHYSICAL THERAPY | Facility: CLINIC | Age: 79
End: 2024-12-12
Payer: MEDICARE

## 2024-12-12 DIAGNOSIS — R53.1 WEAKNESS GENERALIZED: ICD-10-CM

## 2024-12-12 DIAGNOSIS — R26.2 DIFFICULTY WALKING: Primary | ICD-10-CM

## 2024-12-12 DIAGNOSIS — R26.89 IMBALANCE: ICD-10-CM

## 2024-12-12 PROCEDURE — 97530 THERAPEUTIC ACTIVITIES: CPT

## 2024-12-12 NOTE — PROGRESS NOTES
"Progress Note  [place] [facility] [address] [place] [address]      Patient: Kelsey Durand   : 1945  Diagnosis/ICD-10 Code:  Difficulty walking [R26.2]  Referring practitioner: Chris Elder MD  Date of Initial Visit: Type: THERAPY  Noted: 2024  Today's Date: 2024  Patient seen for 8 sessions      Subjective:   Subjective Questionnaire:     5xSTS: 25 seconds (intermittent use of arms on chair (sometimes used no UE), pt favors R leg, improved descent compared to first visit but still intermittently plops back down into chair)     TU.24 seconds with use of rolator (use of arms on chair)     2 min walk: 231.9 feet with rolator      Static stance with feet apart: 6 seconds      Clinical Progress: improved  Home Program Compliance: No  Treatment has included: therapeutic exercise, neuromuscular re-education, manual therapy, therapeutic activity, and gait training    Subjective     Pt reports that her R knee was hurting last night but it is not hurting this morning. Pt reports that she took a regular tylenol and a \"tylenol arthritis\" this morning. Pt has not been doing her exercises at home. Pt reports that she feels she has gotten \"so-so\" better. She feels scared and she does not know why. She lives in a different place than she used to, which she thinks contributes to her nervousness.     Pt reports that she does not want to schedule any more therapy visits.    Objective      Strength/Myotome Testing      Left Hip   Planes of Motion   Flexion: 4-     Right Hip   Planes of Motion   Flexion: 4     Left Ankle/Foot   Dorsiflexion: 5     Right Ankle/Foot   Dorsiflexion: 5    Sensation:  Pt able to identify verbally (with eyes closed) when therapist provided light touch to dermatomes L2, L3, L4 and S1 bilaterally.      See Exercise, Manual, and Modality Logs for complete treatment.     Assessment/Plan  Pt presents to therapy with complaints of imbalance and of history of falls. Pt " tolerated today's session well and, as always, was very challenged with static and dynamic balance activities. Today was reassessment day. Pt has made some improvements since beginning therapy. For example, she has improved with STS and is now confident enough to perform them without the use of her UE. She was able to improve her 5xSTS test time by about 1 second. Pt was also able to improve her TUG test score significantly, which allowed her to meet her short-term goal for that test. This shows improvements in LE agility and gait speed. Pt able to improve her step count on the 2 minute walk test but not by much. Pt still has extreme fear and difficulty with static stance while not having UE support or support at the back of the knees. When testing static balance, pt would frequently reach for her walker or for the therapist. To date, pt has met two of her goals. She has 8 unmet goals remaining. Pt is noncompliant with her HEP. Therapist recommended to pt that she extend her therapy farther out after her next 3 visits. Pt declined. She does not want to schedule more; she wants to use the next 3 visits and then be discharged. Skilled therapy still required in order to address deficits so that she has decreased falls risk. Continue POC.        Goals  Plan Goals:      1. Mobility: Walking/Moving Around Functional Limitation                               LTG 1: 12 weeks:  The patient will demonstrate TUG score <17 seconds (with her rolator) for reduced fall risk.                           STATUS: progressing              STG 1a: 6 weeks:  The patient will demonstrate TUG score <25 seconds (with her rolator) for reduced fall risk.                           STATUS:  MET     2. The patient has limited strength of the B hips.              LTG 2a: 12 weeks:  The patient will demonstrate 4+ /5 strength for B hip flexion, in order to improve hip stability for gait and decreased falls risk.                          STATUS:   progressing      3. The patient has limited LE strength and endurance              LTG 3: 12 weeks:  The patient will perform the 5xSTS test in 19 seconds or less, with the use of arms on chair, in order to indicate improved LE strength and endurance so that she has increased ease with transfers in her home.                           STATUS:  progressing               STG 3a: The patient will perform the 5xSTS test in 25 seconds or less, with the use of arms on chair, in order to indicate improved LE strength and endurance so that she has increased ease with transfers in her home.                           STATUS:  MET                 4. The patient has difficulty with balance.               LTG 4: 12 weeks: The patient will hold the romberg position on floor with eyes open for 30 seconds in order to improve balance and decrease risk of falling.                           STATUS: progressing               STG 4: 6 weeks: The patient will hold the feet apart position on floor with eyes open for 30 seconds in order to improve balance and decrease risk of falling.                           STATUS: progressing                   5. Mobility: Walking/Moving Around Functional Limitation                               LTG 5: 12 weeks: The pt will ambulate 450 feet or more during the 2 minute walk test with the use of her rolator, in order to indicate improved LE endurance so that community ambulation is easier and safer for her.                           STATUS:  progressing              STG 5a: 6 weeks: The pt will ambulate 270 feet or more during the 2 minute walk test with the use of her rolator, in order to indicate improved LE endurance so that community ambulation is easier and safer for her.                           STATUS:  progressing                 LTG 5b: 12 weeks: The pt will ambulate 100 feet or more wither her cane and with CGA from the therapist, in order to indicate improved gait, so that she has increased  efficiency with ambulating in her home and in the community.                          STATUS:  progressing       Progress toward previous goals: Partially Met      Recommendations: Continue as planned  Timeframe: 3 weeks  Prognosis to achieve goals: good    Signature: Cheri Long PT    Electronically signed 2024    [place] License: PT - [id number]      Timed:  Manual Therapy:    0     mins  08997;  Therapeutic Exercise:    0     mins  60075;     Neuromuscular Tammy:    0    mins  40923;    Therapeutic Activity:     24     mins  35318;     Gait Trainin     mins  65030;     Aquatics                         0      mins  78100    Un-timed:  Mechanical Traction      0     mins  06769  Dry Needling     0     mins self-pay  Electrical Stimulation:    0     mins  91661 ( );    Timed Treatment:   24   mins   Total Treatment:     24   mins

## 2024-12-17 ENCOUNTER — TREATMENT (OUTPATIENT)
Dept: PHYSICAL THERAPY | Facility: CLINIC | Age: 79
End: 2024-12-17
Payer: MEDICARE

## 2024-12-17 DIAGNOSIS — R26.2 DIFFICULTY WALKING: Primary | ICD-10-CM

## 2024-12-17 DIAGNOSIS — R26.89 IMBALANCE: ICD-10-CM

## 2024-12-17 DIAGNOSIS — R53.1 WEAKNESS GENERALIZED: ICD-10-CM

## 2024-12-17 PROCEDURE — 97110 THERAPEUTIC EXERCISES: CPT

## 2024-12-17 PROCEDURE — 97530 THERAPEUTIC ACTIVITIES: CPT

## 2024-12-17 NOTE — PROGRESS NOTES
"   Physical Therapy Daily Treatment Note                      [place] [facility] [address] [facility] [place] [address]    Patient: Kelsey Durand   : 1945  Diagnosis/ICD-10 Code:  Difficulty walking [R26.2]  Referring practitioner: Chris Elder MD  Date of Initial Visit: Type: THERAPY  Noted: 2024  Today's Date: 2024  Patient seen for 9 sessions           Subjective   The patient reported, \"I'm here\" when asked how she was doing today.     Objective   See Exercise, Manual, and Modality Logs for complete treatment.     Assessment/Plan  Pt tolerated today's session well. Pt continues to exhibit a lot of fear when performing any standing activities without UE support. Pt was able to only do a few STS today without the use of her UE's before reaching for her rolator. Pt had to use L UE support on her pants for L side seated marches, indicating hip flexor weakness. Pt still does not want to extend her physical therapy. Skilled therapy still required for the next two sessions in order to attempt to improve balance and gait and to decrease risk for falls. Continue POC.        Timed:  Manual Therapy:    0     mins  44233;  Therapeutic Exercise:    8     mins  62560;     Neuromuscular Tammy:   0   mins  37149;    Therapeutic Activity:     18     mins  96469;     Gait Trainin     mins  52358;     Aquatics                         0      mins  99530    Un-timed:  Mechanical Traction      0     mins  81269  Dry Needling     0     mins self-pay  Electrical Stimulation:    0     mins  26679 ( );      Timed Treatment:   26   mins   Total Treatment:     26   mins    Cheri Long PT    Electronically signed 2024    [place] License: PT - [id number]                       "

## 2024-12-19 ENCOUNTER — TREATMENT (OUTPATIENT)
Dept: PHYSICAL THERAPY | Facility: CLINIC | Age: 79
End: 2024-12-19
Payer: MEDICARE

## 2024-12-19 DIAGNOSIS — R26.2 DIFFICULTY WALKING: Primary | ICD-10-CM

## 2024-12-19 DIAGNOSIS — R53.1 WEAKNESS GENERALIZED: ICD-10-CM

## 2024-12-19 DIAGNOSIS — R26.89 IMBALANCE: ICD-10-CM

## 2024-12-19 PROCEDURE — 97530 THERAPEUTIC ACTIVITIES: CPT

## 2024-12-19 PROCEDURE — 97110 THERAPEUTIC EXERCISES: CPT

## 2024-12-19 NOTE — PROGRESS NOTES
[facility]                              Physical Therapy Daily Treatment Note    Patient: Kelsey Durand   : 1945  Diagnosis/ICD-10 Code:  Difficulty walking [R26.2]  Referring practitioner: Chris Elder MD  Date of Initial Visit: Type: THERAPY  Noted: 2024  Today's Date: 2024  Patient seen for 10 sessions           Subjective   Kelsey Durand reports: no new complaints at time of therapy. Pt states she thinks her next appt with Cheri will be her last appt.       Objective     See Exercise, Manual, and Modality Logs for complete treatment.     Assessment/Plan  Kelsey continues to be fearful when completing sit to stands and will reach for either the therapist standing in front of her or for her rollator which sits at a distance next to her. Recommend skilled PT services to improve balance and weakness with daily tasks to prevent from falls and to keep patient safe.      Progress per Plan of Care         Timed:  Manual Therapy:         mins  18130;  Therapeutic Exercise:    15     mins  72585;     Neuromuscular Tammy:        mins  86898;    Therapeutic Activity:     12     mins  10394;     Gait Training:           mins  11324;        Untimed:  Electrical Stimulation:         mins  51526 ( );  Mechanical Traction:         mins  06090;       Timed Treatment:   27   mins   Total Treatment:     27   mins      Electronically signed:     Laney Thompson PTA  Physical Therapist Assistant  [facility] License #: [id number]

## 2024-12-26 ENCOUNTER — TREATMENT (OUTPATIENT)
Dept: PHYSICAL THERAPY | Facility: CLINIC | Age: 79
End: 2024-12-26
Payer: MEDICARE

## 2024-12-26 DIAGNOSIS — R53.1 WEAKNESS GENERALIZED: ICD-10-CM

## 2024-12-26 DIAGNOSIS — R26.89 IMBALANCE: ICD-10-CM

## 2024-12-26 DIAGNOSIS — R26.2 DIFFICULTY WALKING: Primary | ICD-10-CM

## 2024-12-26 PROCEDURE — 97530 THERAPEUTIC ACTIVITIES: CPT

## 2024-12-26 PROCEDURE — 97110 THERAPEUTIC EXERCISES: CPT

## 2024-12-26 NOTE — PROGRESS NOTES
Physical Therapy Daily Treatment Note / Discharge                      [place] [facility] [address] [place] [address]    Patient: Kelsey Durand   : 1945  Diagnosis/ICD-10 Code:  Difficulty walking [R26.2]  Referring practitioner: Chris Elder MD  Date of Initial Visit: Type: THERAPY  Noted: 2024  Today's Date: 2024  Patient seen for 11 sessions           Subjective  The patient reported that she still wants today to be her last day in therapy. She has been practicing her STS at home.     Objective   See Exercise, Manual, and Modality Logs for complete treatment.     Assessment/Plan  Pt tolerated today's session well. At beginning of session, pt willingly demonstrated two STS without using her walker, which surprised the therapist because pt is usually very fearful of this. Pt reported that she has been practicing it at home. Pt has made slight gains, but not significant gains since starting therapy. She could definitely benefit from further skilled therapy to reduce falls risk, but pt wants today to be her last day. Pt will be discharged now due to her not wanting to continue therapy.     Goals  Plan Goals:      1. Mobility: Walking/Moving Around Functional Limitation                               LTG 1: 12 weeks:  The patient will demonstrate TUG score <17 seconds (with her rolator) for reduced fall risk.                           STATUS: not met              STG 1a: 6 weeks:  The patient will demonstrate TUG score <25 seconds (with her rolator) for reduced fall risk.                           STATUS:  MET     2. The patient has limited strength of the B hips.              LTG 2a: 12 weeks:  The patient will demonstrate 4+ /5 strength for B hip flexion, in order to improve hip stability for gait and decreased falls risk.                          STATUS:  not met     3. The patient has limited LE strength and endurance              LTG 3: 12 weeks:  The patient will perform  the 5xSTS test in 19 seconds or less, with the use of arms on chair, in order to indicate improved LE strength and endurance so that she has increased ease with transfers in her home.                           STATUS:  not met              STG 3a: The patient will perform the 5xSTS test in 25 seconds or less, with the use of arms on chair, in order to indicate improved LE strength and endurance so that she has increased ease with transfers in her home.                           STATUS:  MET                 4. The patient has difficulty with balance.               LTG 4: 12 weeks: The patient will hold the romberg position on floor with eyes open for 30 seconds in order to improve balance and decrease risk of falling.                           STATUS: not met              STG 4: 6 weeks: The patient will hold the feet apart position on floor with eyes open for 30 seconds in order to improve balance and decrease risk of falling.                           STATUS: not met                   5. Mobility: Walking/Moving Around Functional Limitation                               LTG 5: 12 weeks: The pt will ambulate 450 feet or more during the 2 minute walk test with the use of her rolator, in order to indicate improved LE endurance so that community ambulation is easier and safer for her.                           STATUS:  not met              STG 5a: 6 weeks: The pt will ambulate 270 feet or more during the 2 minute walk test with the use of her rolator, in order to indicate improved LE endurance so that community ambulation is easier and safer for her.                           STATUS: not met                 LTG 5b: 12 weeks: The pt will ambulate 100 feet or more wither her cane and with CGA from the therapist, in order to indicate improved gait, so that she has increased efficiency with ambulating in her home and in the community.                          STATUS:  not met            Timed:  Manual Therapy:    0      mins  25233;  Therapeutic Exercise:    10     mins  54707;     Neuromuscular Tammy:   0    mins  77043;    Therapeutic Activity:     15     mins  01152;     Gait Trainin     mins  26178;     Aquatics                         0      mins  10446    Un-timed:  Mechanical Traction      0     mins  83842  Dry Needling     0     mins self-pay  Electrical Stimulation:    0     mins  54608 ( );      Timed Treatment:   25   mins   Total Treatment:     25   mins    Cheri Long PT    Electronically signed 2024    [place] License: PT - [id number]

## 2025-01-14 ENCOUNTER — ANESTHESIA EVENT (OUTPATIENT)
Dept: GASTROENTEROLOGY | Facility: HOSPITAL | Age: 80
End: 2025-01-14
Payer: MEDICARE

## 2025-01-15 ENCOUNTER — ANESTHESIA (OUTPATIENT)
Dept: GASTROENTEROLOGY | Facility: HOSPITAL | Age: 80
End: 2025-01-15
Payer: MEDICARE

## 2025-01-15 ENCOUNTER — HOSPITAL ENCOUNTER (OUTPATIENT)
Facility: HOSPITAL | Age: 80
Setting detail: HOSPITAL OUTPATIENT SURGERY
Discharge: HOME | End: 2025-01-15
Attending: INTERNAL MEDICINE | Admitting: INTERNAL MEDICINE
Payer: MEDICARE

## 2025-01-15 VITALS
TEMPERATURE: 97.6 F | BODY MASS INDEX: 32.46 KG/M2 | WEIGHT: 176.37 LBS | DIASTOLIC BLOOD PRESSURE: 69 MMHG | HEIGHT: 62 IN | OXYGEN SATURATION: 96 % | RESPIRATION RATE: 17 BRPM | SYSTOLIC BLOOD PRESSURE: 109 MMHG | HEART RATE: 79 BPM

## 2025-01-15 DIAGNOSIS — Z12.11 COLON CANCER SCREENING: ICD-10-CM

## 2025-01-15 DIAGNOSIS — Z86.0100 HISTORY OF COLON POLYPS: ICD-10-CM

## 2025-01-15 DIAGNOSIS — Z80.0 FAMILY HISTORY OF COLON CANCER: ICD-10-CM

## 2025-01-15 LAB — GLUCOSE BLDC GLUCOMTR-MCNC: 142 MG/DL (ref 70–99)

## 2025-01-15 PROCEDURE — 25810000003 LACTATED RINGERS PER 1000 ML

## 2025-01-15 PROCEDURE — 88305 TISSUE EXAM BY PATHOLOGIST: CPT | Performed by: INTERNAL MEDICINE

## 2025-01-15 PROCEDURE — 25010000002 LIDOCAINE PF 2% 2 % SOLUTION

## 2025-01-15 PROCEDURE — 45390 COLONOSCOPY W/RESECTION: CPT | Performed by: INTERNAL MEDICINE

## 2025-01-15 PROCEDURE — 82948 REAGENT STRIP/BLOOD GLUCOSE: CPT

## 2025-01-15 PROCEDURE — 25010000002 PROPOFOL 10 MG/ML EMULSION

## 2025-01-15 DEVICE — DEV CLIP HEMO RESOLUTION360/ULTR 235CM 17MM STRL: Type: IMPLANTABLE DEVICE | Site: ASCENDING COLON | Status: FUNCTIONAL

## 2025-01-15 RX ORDER — PROPOFOL 10 MG/ML
VIAL (ML) INTRAVENOUS AS NEEDED
Status: DISCONTINUED | OUTPATIENT
Start: 2025-01-15 | End: 2025-01-15 | Stop reason: SURG

## 2025-01-15 RX ORDER — LIDOCAINE HYDROCHLORIDE 20 MG/ML
INJECTION, SOLUTION EPIDURAL; INFILTRATION; INTRACAUDAL; PERINEURAL AS NEEDED
Status: DISCONTINUED | OUTPATIENT
Start: 2025-01-15 | End: 2025-01-15 | Stop reason: SURG

## 2025-01-15 RX ORDER — SODIUM CHLORIDE, SODIUM LACTATE, POTASSIUM CHLORIDE, CALCIUM CHLORIDE 600; 310; 30; 20 MG/100ML; MG/100ML; MG/100ML; MG/100ML
30 INJECTION, SOLUTION INTRAVENOUS CONTINUOUS
Status: DISCONTINUED | OUTPATIENT
Start: 2025-01-15 | End: 2025-01-15 | Stop reason: HOSPADM

## 2025-01-15 RX ORDER — PHENYLEPHRINE HCL IN 0.9% NACL 1 MG/10 ML
SYRINGE (ML) INTRAVENOUS AS NEEDED
Status: DISCONTINUED | OUTPATIENT
Start: 2025-01-15 | End: 2025-01-15 | Stop reason: SURG

## 2025-01-15 RX ADMIN — PROPOFOL 50 MG: 10 INJECTION, EMULSION INTRAVENOUS at 09:03

## 2025-01-15 RX ADMIN — SODIUM CHLORIDE, POTASSIUM CHLORIDE, SODIUM LACTATE AND CALCIUM CHLORIDE 30 ML/HR: 600; 310; 30; 20 INJECTION, SOLUTION INTRAVENOUS at 08:03

## 2025-01-15 RX ADMIN — Medication 100 MCG: at 09:27

## 2025-01-15 RX ADMIN — LIDOCAINE HYDROCHLORIDE 40 MG: 20 INJECTION, SOLUTION EPIDURAL; INFILTRATION; INTRACAUDAL; PERINEURAL at 09:03

## 2025-01-15 RX ADMIN — Medication 150 MCG: at 09:23

## 2025-01-15 RX ADMIN — PROPOFOL 150 MCG/KG/MIN: 10 INJECTION, EMULSION INTRAVENOUS at 09:03

## 2025-01-15 RX ADMIN — Medication 200 MCG: at 09:25

## 2025-01-15 NOTE — H&P
ScreeningPre Procedure History & Physical    Chief Complaint:   Screening     Subjective     HPI:   Screening     Past Medical History:   Past Medical History:   Diagnosis Date    Allergic rhinitis due to pollen     Atrophy of thyroid (acquired)     Chronic fatigue     Colitis     Difficulty walking 2018    Disease of thyroid gland     DM (diabetes mellitus)     Foot pain, right     Fracture, foot Apr 4 2022    Fell in yard Mon morning. Sorta loss balance. 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    Thyroid nodule     Unspecified osteoarthritis, unspecified site     Vitamin D deficiency, unspecified        Past Surgical History:  Past Surgical History:   Procedure Laterality Date    CATARACT EXTRACTION      12/14/2011 Left Cataract Surgery 11/30/2011 right eye cataract surgery    COLONOSCOPY      11/2020 colonoscopy    LEEP  2000    OTHER SURGICAL HISTORY      FNA       Family History:  Family History   Problem Relation Age of Onset    Breast cancer Sister     Colon cancer Daughter        Social History:   reports that she has never smoked. She has been exposed to tobacco smoke. She has never used smokeless tobacco. She reports that she does not currently use alcohol. She reports that she does not use drugs.    Medications:   Medications Prior to Admission   Medication Sig Dispense Refill Last Dose/Taking    acetaminophen (TYLENOL) 500 MG tablet Take 1 tablet by mouth Every 6 (Six) Hours As Needed for Mild Pain.   1/14/2025    dicyclomine (BENTYL) 10 MG capsule 1 to 2 capsules 30 minutes prior to meals as needed. 60 capsule 1 Past Week    escitalopram (LEXAPRO) 5 MG tablet Take 1 tablet by mouth Daily. 90 tablet 1 1/14/2025    levothyroxine (SYNTHROID, LEVOTHROID) 100 MCG tablet Take 1 tablet by mouth Every  "Morning. 90 tablet 3 1/14/2025    metoprolol succinate XL (TOPROL-XL) 25 MG 24 hr tablet TAKE 1 TABLET BY MOUTH EVERY DAY 90 tablet 3 1/14/2025    multivitamin with minerals tablet tablet Take 1 tablet by mouth Daily.   1/14/2025    oxybutynin (DITROPAN) 5 MG tablet Take 1 tablet every evening, may increase until taking 3 tablets every evening if needed. 90 tablet 1 Past Month    simvastatin (ZOCOR) 10 MG tablet Take 1 tablet by mouth Every Other Day. 45 tablet 3 Past Week    clotrimazole (LOTRIMIN) 1 % cream Apply 1 Application topically to the appropriate area as directed 2 (Two) Times a Day. 28 g 1        Allergies:  Sulfa antibiotics        Objective     Blood pressure 136/91, pulse 85, temperature 97.6 °F (36.4 °C), temperature source Temporal, resp. rate 16, height 157.5 cm (62\"), weight 80 kg (176 lb 5.9 oz), SpO2 91%.    Physical Exam   Constitutional: Pt is oriented to person, place, and time and well-developed, well-nourished, and in no distress.   Mouth/Throat: Oropharynx is clear and moist.   Neck: Normal range of motion.   Cardiovascular: Normal rate, regular rhythm and normal heart sounds.    Pulmonary/Chest: Effort normal and breath sounds normal.   Abdominal: Soft. Nontender  Skin: Skin is warm and dry.   Psychiatric: Mood, memory, affect and judgment normal.     Assessment & Plan     Diagnosis:  Screening colonoscopy  H/o colon polyps     Anticipated Surgical Procedure:  colonoscopy    The risks, benefits, and alternatives of this procedure have been discussed with the patient or the responsible party- the patient understands and agrees to proceed.            "

## 2025-01-15 NOTE — ANESTHESIA POSTPROCEDURE EVALUATION
Patient: Kelsey Durand    Procedure Summary       Date: 01/15/25 Room / Location: [facility] ENDOSCOPY 2 / [facility]COPY    Anesthesia Start: 0900 Anesthesia Stop: 0934    Procedure: COLONOSCOPY WITH HOT SNARE POLYPECTOMY, ELEVIEW INJECTION AND CLIPX1 Diagnosis:       Colon cancer screening      History of colon polyps      Family history of colon cancer      (Colon cancer screening [Z12.11])      (History of colon polyps [Z86.0100])      (Family history of colon cancer [Z80.0])    Surgeons: Denzel Streeter MD Provider: Everett Julio CRNA    Anesthesia Type: general ASA Status: 3            Anesthesia Type: general    Vitals  Vitals Value Taken Time   /88 01/15/25 0947   Temp 36.4 °C (97.6 °F) 01/15/25 0947   Pulse 68 01/15/25 0951   Resp 17 01/15/25 0947   SpO2 92 % 01/15/25 0951   Vitals shown include unfiled device data.        Post Anesthesia Care and Evaluation    Patient location during evaluation: bedside  Patient participation: complete - patient participated  Level of consciousness: awake  Pain management: adequate    Airway patency: patent  Anesthetic complications: No anesthetic complications  PONV Status: controlled  Cardiovascular status: acceptable and stable  Respiratory status: acceptable

## 2025-01-16 LAB
CYTO UR: NORMAL
LAB AP CASE REPORT: NORMAL
LAB AP CLINICAL INFORMATION: NORMAL
PATH REPORT.FINAL DX SPEC: NORMAL
PATH REPORT.GROSS SPEC: NORMAL

## 2025-02-24 ENCOUNTER — TELEPHONE (OUTPATIENT)
Dept: PODIATRY | Facility: CLINIC | Age: 80
End: 2025-02-24
Payer: MEDICARE

## 2025-02-24 NOTE — TELEPHONE ENCOUNTER
Left voicemail for patient that we trim toenails with a nail nipper and then smooth the nails out with a geraldine Advised if she would like this to be done to call and schedule an appointment.

## 2025-02-24 NOTE — TELEPHONE ENCOUNTER
Caller: KAITLIN    Relationship: SELF    Best call back number: [phone]    What is the best time to reach you: ANY    Who are you requesting to speak with (clinical staff, provider,  specific staff member): CLINICAL    What was the call regarding: SHE WANTS TO KNOW IF DR WALLACE TAKES CARE OF INGROWN TOENAILS AND HOW DOES HE DO IT- SHE DOES NOT WANT HER TOENAILS REMOVED- SHE HAS BEEN GETTING PEDICURES BUT THE LADY HURT HER LAST TIME- PLEASE CALL

## 2025-03-17 RX ORDER — ESCITALOPRAM OXALATE 5 MG/1
5 TABLET ORAL DAILY
Qty: 90 TABLET | Refills: 1 | Status: SHIPPED | OUTPATIENT
Start: 2025-03-17

## 2025-03-20 ENCOUNTER — OFFICE VISIT (OUTPATIENT)
Dept: PODIATRY | Facility: CLINIC | Age: 80
End: 2025-03-20
Payer: MEDICARE

## 2025-03-20 VITALS
DIASTOLIC BLOOD PRESSURE: 70 MMHG | TEMPERATURE: 96.6 F | OXYGEN SATURATION: 96 % | WEIGHT: 180 LBS | BODY MASS INDEX: 33.13 KG/M2 | HEART RATE: 70 BPM | SYSTOLIC BLOOD PRESSURE: 116 MMHG | HEIGHT: 62 IN

## 2025-03-20 DIAGNOSIS — M79.672 FOOT PAIN, LEFT: ICD-10-CM

## 2025-03-20 DIAGNOSIS — G60.9 IDIOPATHIC NEUROPATHY: Primary | ICD-10-CM

## 2025-03-20 DIAGNOSIS — R26.2 DIFFICULTY WALKING: ICD-10-CM

## 2025-03-20 DIAGNOSIS — L60.0 ONYCHOCRYPTOSIS: ICD-10-CM

## 2025-03-20 NOTE — PROGRESS NOTES
[facility] - PODIATRY    Today's Date: 03/20/25    Patient Name: Kelsey Durand  MRN: [id number]  CSN: [id number]  PCP: Chris Elder MD  Referring Provider: No ref. provider found    Patient or patient representative verbalized consent for the use of Ambient Listening during the visit with  Bin Vo DPM for chart documentation. 3/20/2025  08:54 EDT    SUBJECTIVE     Chief Complaint   Patient presents with    Left Foot - Follow-up, Nail Problem     States great toe is ingrown     HPI: Kelsey Durand, a 79 y.o.female, presents to clinic.    New problem    History of Present Illness  The patient presents for evaluation of an ingrown toenail.    She reports that the issue began in 12/2024, with persistent pain despite attempts at home management. She sought professional intervention from a  in 01/2025, who performed a procedure that provided temporary relief. However, the discomfort has since returned, and she is unable to wear her usual footwear due to the pain.    Patient denies any fevers, chills, nausea, vomiting, shortness of breath, nor any other constitutional signs nor symptoms.    No other pedal complaints at this time.    Past Medical History:   Diagnosis Date    Allergic     Allergic rhinitis due to pollen     Anxiety     Atrophy of thyroid (acquired)     Cataract Nov 2012    Removed    Chronic fatigue     Colitis     Colon polyp     Difficulty walking 2018    Disease of thyroid gland     DM (diabetes mellitus)     Foot pain, right     Fracture, foot Apr 4 2022    Fell in yard Mon morning. Sorta loss balance. 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    Thyroid nodule     Unspecified osteoarthritis, unspecified site     Urinary tract  infection Sep 2022    Vitamin D deficiency, unspecified      Past Surgical History:   Procedure Laterality Date    CATARACT EXTRACTION      12/14/2011 Left Cataract Surgery 11/30/2011 right eye cataract surgery    COLONOSCOPY      11/2020 colonoscopy    COLONOSCOPY N/A 1/15/2025    Procedure: COLONOSCOPY WITH HOT SNARE POLYPECTOMY, ELEVIEW INJECTION AND CLIPX1;  Surgeon: Denzel Streeter MD;  Location: [facility] ENDOSCOPY;  Service: Gastroenterology;  Laterality: N/A;  COLON POLYP, DIVERTICULOSIS, HEMORRHOIDS    LEEP  2000    OTHER SURGICAL HISTORY      FNA     Family History   Problem Relation Age of Onset    Breast cancer Sister     Colon cancer Daughter     Cancer Sister     Cancer Brother         Step brother. Had valve repair    Arthritis Daughter         Cancer of rectom     Social History     Socioeconomic History    Marital status:    Tobacco Use    Smoking status: Never     Passive exposure: Yes    Smokeless tobacco: Never   Vaping Use    Vaping status: Never Used   Substance and Sexual Activity    Alcohol use: Not Currently     Comment: rarely    Drug use: Never    Sexual activity: Not Currently     Partners: Male     Birth control/protection: Condom, Pill, None, Birth control pill     Comment: I am 77 years old! why!     Allergies   Allergen Reactions    Sulfa Antibiotics Rash and Hives     Current Outpatient Medications   Medication Sig Dispense Refill    acetaminophen (TYLENOL) 500 MG tablet Take 1 tablet by mouth Every 6 (Six) Hours As Needed for Mild Pain.      clotrimazole (LOTRIMIN) 1 % cream Apply 1 Application topically to the appropriate area as directed 2 (Two) Times a Day. 28 g 1    dicyclomine (BENTYL) 10 MG capsule 1 to 2 capsules 30 minutes prior to meals as needed. 60 capsule 1    escitalopram (LEXAPRO) 5 MG tablet TAKE 1 TABLET BY MOUTH EVERY DAY 90 tablet 1    levothyroxine (SYNTHROID, LEVOTHROID) 100 MCG tablet Take 1 tablet by mouth Every Morning. 90 tablet 3    metoprolol  succinate XL (TOPROL-XL) 25 MG 24 hr tablet TAKE 1 TABLET BY MOUTH EVERY DAY 90 tablet 3    multivitamin with minerals tablet tablet Take 1 tablet by mouth Daily.      simvastatin (ZOCOR) 10 MG tablet Take 1 tablet by mouth Every Other Day. 45 tablet 3    oxybutynin (DITROPAN) 5 MG tablet Take 1 tablet every evening, may increase until taking 3 tablets every evening if needed. (Patient not taking: Reported on 3/20/2025) 90 tablet 1     No current facility-administered medications for this visit.       OBJECTIVE     Vitals:    03/20/25 0824   BP: 116/70   Pulse: 70   Temp: 96.6 °F (35.9 °C)   SpO2: 96%       WBC   Date Value Ref Range Status   10/03/2024 4.65 3.40 - 10.80 10*3/mm3 Final     RBC   Date Value Ref Range Status   10/03/2024 4.58 3.77 - 5.28 10*6/mm3 Final     Hemoglobin   Date Value Ref Range Status   10/03/2024 13.8 12.0 - 15.9 g/dL Final     Hematocrit   Date Value Ref Range Status   10/03/2024 41.4 34.0 - 46.6 % Final     MCV   Date Value Ref Range Status   10/03/2024 90.4 79.0 - 97.0 fL Final     MCH   Date Value Ref Range Status   10/03/2024 30.1 26.6 - 33.0 pg Final     MCHC   Date Value Ref Range Status   10/03/2024 33.3 31.5 - 35.7 g/dL Final     RDW   Date Value Ref Range Status   10/03/2024 13.1 12.3 - 15.4 % Final     RDW-SD   Date Value Ref Range Status   10/03/2024 43.1 37.0 - 54.0 fl Final     MPV   Date Value Ref Range Status   10/03/2024 10.2 6.0 - 12.0 fL Final     Platelets   Date Value Ref Range Status   10/03/2024 263 140 - 450 10*3/mm3 Final     Neutrophil %   Date Value Ref Range Status   10/03/2024 59.4 42.7 - 76.0 % Final     Lymphocyte %   Date Value Ref Range Status   10/03/2024 23.2 19.6 - 45.3 % Final     Monocyte %   Date Value Ref Range Status   10/03/2024 12.0 5.0 - 12.0 % Final     Eosinophil %   Date Value Ref Range Status   10/03/2024 4.1 0.3 - 6.2 % Final     Basophil %   Date Value Ref Range Status   10/03/2024 0.9 0.0 - 1.5 % Final     Immature Grans %   Date Value  Ref Range Status   10/03/2024 0.4 0.0 - 0.5 % Final     Neutrophils, Absolute   Date Value Ref Range Status   10/03/2024 2.76 1.70 - 7.00 10*3/mm3 Final     Lymphocytes, Absolute   Date Value Ref Range Status   10/03/2024 1.08 0.70 - 3.10 10*3/mm3 Final     Monocytes, Absolute   Date Value Ref Range Status   10/03/2024 0.56 0.10 - 0.90 10*3/mm3 Final     Eosinophils, Absolute   Date Value Ref Range Status   10/03/2024 0.19 0.00 - 0.40 10*3/mm3 Final     Basophils, Absolute   Date Value Ref Range Status   10/03/2024 0.04 0.00 - 0.20 10*3/mm3 Final     Immature Grans, Absolute   Date Value Ref Range Status   10/03/2024 0.02 0.00 - 0.05 10*3/mm3 Final     nRBC   Date Value Ref Range Status   10/03/2024 0.0 0.0 - 0.2 /100 WBC Final         Lab Results   Component Value Date    GLUCOSE 94 10/03/2024    BUN 16 10/03/2024    CREATININE 0.92 10/03/2024     10/03/2024    K 4.2 10/03/2024     10/03/2024    CALCIUM 9.5 10/03/2024    PROTEINTOT 7.2 10/03/2024    ALBUMIN 4.0 10/03/2024    ALT 14 10/03/2024    AST 23 10/03/2024    ALKPHOS 52 10/03/2024    BILITOT 0.8 10/03/2024    GLOB 3.2 10/03/2024    AGRATIO 1.3 10/03/2024    BCR 17.4 10/03/2024    ANIONGAP 11.0 10/03/2024    EGFR 62.7 10/03/2024       Patient seen in no apparent distress.      PHYSICAL EXAM:     Foot/Ankle Exam    GENERAL  Appearance:  appears stated age and elderly  Orientation:  AAOx3  Affect:  appropriate  Gait:  antalgic  Assistance:  walker  Right shoe gear: casual shoe  Left shoe gear: casual shoe    VASCULAR     Right Foot Vascularity   Dorsalis pedis:  1+  Posterior tibial:  1+  Skin temperature:  warm  Edema grading:  None  CFT:  < 3 seconds  Pedal hair growth:  Absent  Varicosities:  moderate varicosities     Left Foot Vascularity   Dorsalis pedis:  1+  Posterior tibial:  1+  Skin temperature:  warm  Edema grading:  None  CFT:  < 3 seconds  Pedal hair growth:  Absent  Varicosities:  moderate varicosities     NEUROLOGIC     Right Foot  Neurologic   Light touch sensation: diminished  Vibratory sensation: diminished  Hot/Cold sensation: diminished  Protective Sensation using [place] Monofilament:   Sites intact: 5  Sites tested: 10     Left Foot Neurologic   Light touch sensation: diminished  Vibratory sensation: diminished  Hot/Cold sensation:  diminished  Protective Sensation using [place] Monofilament:   Sites intact: 5  Sites tested: 10    MUSCULOSKELETAL     Left Foot Musculoskeletal   Tenderness:  toe 1 tenderness    MUSCLE STRENGTH     Right Foot Muscle Strength   Foot dorsiflexion:  4  Foot plantar flexion:  4  Foot inversion:  4  Foot eversion:  4     Left Foot Muscle Strength   Foot dorsiflexion:  4  Foot plantar flexion:  4  Foot inversion:  4  Foot eversion:  4    RANGE OF MOTION     Right Foot Range of Motion   Foot and ankle ROM within normal limits       Left Foot Range of Motion   Foot and ankle ROM within normal limits      DERMATOLOGIC      Right Foot Dermatologic   Skin  Right foot skin is intact.      Left Foot Dermatologic   Skin  Left foot skin is intact.   Nails comment:  Left 1st bilateral nail borders  Nails  1.  Positive for ingrown toenail. (Medial and lateral borders)      Physical Exam      ASSESSMENT/PLAN     Diagnoses and all orders for this visit:    1. Idiopathic neuropathy (Primary)    2. Foot pain, left    3. Onychocryptosis    4. Difficulty walking        Assessment & Plan  1. Ingrown toenail.  The ingrown toenail is not currently infected. A procedure to trim the corners of the nail was discussed today. A follow-up visit for a more comprehensive procedure is scheduled within the next 1 to 2 weeks. This will involve numbing the area, trimming the sides of the nail, and applying medication under the skin to prevent recurrence. Post-procedure care instructions, including the use of Epsom salts, antibiotics, and a Band-Aid, will be provided. She is advised to wear loose footwear like Crocs or house  slippers on the day of the procedure and to avoid running errands immediately afterward.        Comprehensive lower extremity examination and evaluation was performed.    Discussed findings and treatment plan including risks, benefits, and treatment options with patient in detail. Patient agreed with treatment plan.    Medications and allergies reviewed.  Reviewed available lab values along with other pertinent labs.  These were discussed with the patient.    An After Visit Summary was printed and given to the patient at discharge, including (if requested) any available informative/educational handouts regarding diagnosis, treatment, or medications. All questions were answered to patient/family satisfaction. Should symptoms fail to improve or worsen they agree to call or return to clinic or to go to the Emergency Department. Discussed the importance of following up with any needed screening tests/labs/specialist appointments and any requested follow-up recommended by me today. Importance of maintaining follow-up discussed and patient accepts that missed appointments can delay diagnosis and potentially lead to worsening of conditions.    Return for P and A Procedure., or sooner if acute issues arise.    This document has been electronically signed by Bin Vo DPM on March 20, 2025 08:55 EDT

## 2025-04-01 ENCOUNTER — LAB (OUTPATIENT)
Dept: LAB | Facility: HOSPITAL | Age: 80
End: 2025-04-01
Payer: MEDICARE

## 2025-04-01 DIAGNOSIS — E03.4 ACQUIRED ATROPHY OF THYROID: ICD-10-CM

## 2025-04-01 DIAGNOSIS — R73.01 IFG (IMPAIRED FASTING GLUCOSE): ICD-10-CM

## 2025-04-01 DIAGNOSIS — E78.2 MIXED HYPERLIPIDEMIA: ICD-10-CM

## 2025-04-01 LAB
ALBUMIN SERPL-MCNC: 3.7 G/DL (ref 3.5–5.2)
ALBUMIN/GLOB SERPL: 0.9 G/DL
ALP SERPL-CCNC: 58 U/L (ref 39–117)
ALT SERPL W P-5'-P-CCNC: 18 U/L (ref 1–33)
ANION GAP SERPL CALCULATED.3IONS-SCNC: 9.3 MMOL/L (ref 5–15)
AST SERPL-CCNC: 23 U/L (ref 1–32)
BILIRUB SERPL-MCNC: 0.8 MG/DL (ref 0–1.2)
BUN SERPL-MCNC: 13 MG/DL (ref 8–23)
BUN/CREAT SERPL: 16 (ref 7–25)
CALCIUM SPEC-SCNC: 9.5 MG/DL (ref 8.6–10.5)
CHLORIDE SERPL-SCNC: 103 MMOL/L (ref 98–107)
CHOLEST SERPL-MCNC: 159 MG/DL (ref 0–200)
CO2 SERPL-SCNC: 26.7 MMOL/L (ref 22–29)
CREAT SERPL-MCNC: 0.81 MG/DL (ref 0.57–1)
EGFRCR SERPLBLD CKD-EPI 2021: 73 ML/MIN/1.73
GLOBULIN UR ELPH-MCNC: 3.9 GM/DL
GLUCOSE SERPL-MCNC: 115 MG/DL (ref 65–99)
HBA1C MFR BLD: 6.3 % (ref 4.8–5.6)
HDLC SERPL-MCNC: 61 MG/DL (ref 40–60)
LDLC SERPL CALC-MCNC: 79 MG/DL (ref 0–100)
LDLC/HDLC SERPL: 1.27 {RATIO}
POTASSIUM SERPL-SCNC: 4.6 MMOL/L (ref 3.5–5.2)
PROT SERPL-MCNC: 7.6 G/DL (ref 6–8.5)
SODIUM SERPL-SCNC: 139 MMOL/L (ref 136–145)
TRIGL SERPL-MCNC: 104 MG/DL (ref 0–150)
TSH SERPL DL<=0.05 MIU/L-ACNC: 2.68 UIU/ML (ref 0.27–4.2)
VLDLC SERPL-MCNC: 19 MG/DL (ref 5–40)

## 2025-04-01 PROCEDURE — 83036 HEMOGLOBIN GLYCOSYLATED A1C: CPT

## 2025-04-01 PROCEDURE — 84443 ASSAY THYROID STIM HORMONE: CPT

## 2025-04-01 PROCEDURE — 80053 COMPREHEN METABOLIC PANEL: CPT

## 2025-04-01 PROCEDURE — 80061 LIPID PANEL: CPT

## 2025-04-01 PROCEDURE — 36415 COLL VENOUS BLD VENIPUNCTURE: CPT

## 2025-04-18 ENCOUNTER — OFFICE VISIT (OUTPATIENT)
Age: 80
End: 2025-04-18
Payer: MEDICARE

## 2025-04-18 VITALS
WEIGHT: 177.6 LBS | OXYGEN SATURATION: 93 % | DIASTOLIC BLOOD PRESSURE: 76 MMHG | BODY MASS INDEX: 32.68 KG/M2 | SYSTOLIC BLOOD PRESSURE: 136 MMHG | HEIGHT: 62 IN | HEART RATE: 56 BPM

## 2025-04-18 DIAGNOSIS — E55.9 VITAMIN D DEFICIENCY: ICD-10-CM

## 2025-04-18 DIAGNOSIS — I10 PRIMARY HYPERTENSION: ICD-10-CM

## 2025-04-18 DIAGNOSIS — E03.4 ACQUIRED ATROPHY OF THYROID: Primary | ICD-10-CM

## 2025-04-18 DIAGNOSIS — R73.01 IFG (IMPAIRED FASTING GLUCOSE): ICD-10-CM

## 2025-04-18 DIAGNOSIS — E78.2 MIXED HYPERLIPIDEMIA: ICD-10-CM

## 2025-04-18 NOTE — ASSESSMENT & PLAN NOTE
Blood pressure remains well-controlled and her pulse is around 60 as of her 4/25 routine office visit.  She is stable to continue with just low-dose metoprolol

## 2025-04-18 NOTE — ASSESSMENT & PLAN NOTE
LDL stable at 79 as of 4/25 OV.  She is just being treated for primary prevention, stable to continue with low-dose simvastatin.

## 2025-04-18 NOTE — PROGRESS NOTES
Chief Complaint  Hypertension (6 Month F/Up) and Follow-up    Subjective          Kelsey Durand presents to [facility] INTERNAL MEDICINE     History of Present Illness:  Patient is a pleasant 80-year-old female with underlying hyperlipidemia, hypothyroidism, IFG, among others, who is coming in 4/25 for her routine 6-month follow-up.  We will review her medication list, go over her labs in detail, and address other concerns at that time. (In apartment now 10/24, has lady helping with cleaning).    ---> seen 11/23 for Hospitial follow-up/TCM:  COVID-19 infection  Paroxysmal atrial fibrillation/flutter, new diagnosis  Shortness of breath secondary to COVID-19 pneumonia  Hypokalemia, new  --Patient was found to be COVID-19 positive.  The hospital service contact for further evaluation and management.  Patient started on Paxlovid.  Telemetry review did show patient to have paroxysmal atrial fibrillation/flutter.  Patient was treated with therapeutic Lovenox during hospitalization, discussion was had with patient regarding anticoagulation at discharge and patient states she wishes to hold off on starting medication and wishes to discuss further with her PCP after discharge.      Review of Systems   Constitutional:  Negative for appetite change, fatigue and fever.   HENT:  Negative for congestion and ear pain.    Eyes:  Negative for blurred vision.   Respiratory:  Negative for cough, chest tightness, shortness of breath and wheezing.    Cardiovascular:  Negative for chest pain, palpitations and leg swelling.   Gastrointestinal:  Negative for abdominal pain.   Genitourinary:  Negative for difficulty urinating, dysuria and hematuria.   Musculoskeletal:  Negative for arthralgias and gait problem.   Skin:  Negative for skin lesions.   Neurological:  Negative for syncope, memory problem and confusion.   Psychiatric/Behavioral:  Negative for self-injury and depressed mood.        Objective   Vital Signs:  "  /76 Comment: Manual  Pulse 56   Ht 157.5 cm (62\")   Wt 80.6 kg (177 lb 9.6 oz)   SpO2 93%   BMI 32.48 kg/m²           Physical Exam  Vitals and nursing note reviewed.   Constitutional:       General: She is not in acute distress.     Appearance: Normal appearance. She is not toxic-appearing.   HENT:      Head: Atraumatic.      Right Ear: External ear normal.      Left Ear: External ear normal.      Nose: Nose normal.      Mouth/Throat:      Mouth: Mucous membranes are moist.   Eyes:      General:         Right eye: No discharge.         Left eye: No discharge.      Extraocular Movements: Extraocular movements intact.      Pupils: Pupils are equal, round, and reactive to light.   Cardiovascular:      Rate and Rhythm: Normal rate and regular rhythm.      Pulses: Normal pulses.      Heart sounds: Normal heart sounds. No murmur heard.     No gallop.   Pulmonary:      Effort: Pulmonary effort is normal. No respiratory distress.      Breath sounds: No wheezing, rhonchi or rales.   Abdominal:      General: There is no distension.      Palpations: Abdomen is soft. There is no mass.      Tenderness: There is no abdominal tenderness. There is no guarding.   Musculoskeletal:         General: No swelling or tenderness.      Cervical back: No tenderness.      Right lower leg: No edema.      Left lower leg: No edema.   Skin:     General: Skin is warm and dry.      Findings: No rash.   Neurological:      General: No focal deficit present.      Mental Status: She is alert and oriented to person, place, and time. Mental status is at baseline.      Motor: No weakness.      Gait: Gait normal.   Psychiatric:         Mood and Affect: Mood normal.         Thought Content: Thought content normal.          Result Review :   The following data was reviewed by: Chris Elder MD on 10/18/2021:                  Assessment and Plan    Diagnoses and all orders for this visit:    1. Acquired atrophy of thyroid (Primary)  Assessment & " "Plan:  TSH is stable at 0.7, patient can continue with 100 mcg daily as of 4/25.    Orders:  -     TSH+Free T4; Future    2. Mixed hyperlipidemia  Assessment & Plan:   LDL stable at 79 as of 4/25 OV.  She is just being treated for primary prevention, stable to continue with low-dose simvastatin.    Orders:  -     Lipid Panel; Future    3. Primary hypertension  Assessment & Plan:  Blood pressure remains well-controlled and her pulse is around 60 as of her 4/25 routine office visit.  She is stable to continue with just low-dose metoprolol     Orders:  -     CBC & Differential; Future  -     Comprehensive Metabolic Panel; Future  -     Urinalysis With Culture If Indicated -; Future    4. IFG (impaired fasting glucose)  Assessment & Plan:  Patient is A1c is very stable at 6.3 without treatment as of her 4/25 OV, we are fine just checking this every 6 months still.    Orders:  -     Microalbumin / Creatinine Urine Ratio - Urine, Clean Catch; Future  -     Hemoglobin A1c; Future    5. Vitamin D deficiency  -     Vitamin D,25-Hydroxy; Future               --  --  OLDER NOTES:  VISIT 4/21:  ANNUAL MEDICARE WELLNESS PHYSICAL 4/19 = reviewed all forms in office with pt; no new discoveries; active at [employer]=class and walks/ bike at home.  H.M. ISSUES:  BMI 30 and d/w watch carbs and walk.  --  \"HYPERTENSION\" controlled=better at home='s (off meds good while)---> d/w check occ and let us know=fine at home as of 4/21.    LIPIDS at goal with LDL 68...101 so f/u before change...93 fine---> 80 stable 4/21.  --  HYPOTHYROIDISM stable 10/16 with TSH 0.2 still=on same dose many years---> fine 4/21.  IFG mild/stable = FBS 92 with A1C 6.0---> 6.3 in 4/20---> 6.0 in 4/21.  --  ANXIETY D/O 1/19 and has benzo to use PRN; d/w call if feels she needs to use it too often and will add SSRI...cheerful 10/19...will use low dose lexapro as of 4/20 OV---> seems better 10/20.  B12 LTA=547 in 4/20.  --  SYNCOPE is ? seizure per Dr Valadez/Flash " in [facility] over the weekend, so will get EEG (tongue bite/incontinent)...EEG was neg 9/15.  --  BMD 5/17 = spine 0.9, hip 0.6 is great---> BMD 7/20 = spine 1.0, hip -0.3 = great!!  VIT D DEF remains stable on 2000 qd...28 and d/w take routinely please...44---> same 10/20.  --  DJD no new c/o.  R KNEE PAIN with no trauma, comes and goes, exam is w/o laxity, so agree with repeat mobic and use brace and then PTA if no better.  --  A.R. no flare.  --  --  MMG 5/4/2023 per Dr Lawler/Fabiola Pacheco (S+)  COLON 11/19...polyp/FH+ = daughter rectal ca = 5 years per Dr. Streeter  Adacel 3/14, Prevnar '16; Pneumovax #1 10/17; Hep A x2 10/18; d/w Shingrix 10/18;   ( Samuel 7/13, 2 girls here=Leigh Ann (no kids) and Xiao = youngest is boy in 9th grade 12/22 and girl with Asperger's is at [facility]).    Follow Up   Return in about 6 months (around 10/20/2025).  Patient was given instructions and counseling regarding her condition or for health maintenance advice. Please see specific information pulled into the AVS if appropriate.       Answers submitted by the patient for this visit:  Other (Submitted on 10/17/2024)  Please describe your symptoms.: Have to pee quite often. Get up 3-4 times a night. I have bladder leak . A lot of times i dont make it to the bathroom. Im prepared ., , Everything hurts . My legs, arms . I want to fall backwards. I get off balanced.  I moved tk a duplex with laminate floors and can’t walk without walker . Was using a cane. More comfortable with walker. Have falling . Scooted around to other sude of kitchen so i can pull myself up. , Fell backwards at foot of bed and landed on foot of bed frame  Have you had these symptoms before?: Yes  How long have you been having these symptoms?: Greater than 2 weeks  Please list any medications you are currently taking for this condition.: Tylenol  Please describe any probable cause for these symptoms. : Balance. Use a walker in my duplex., Not comfortable walking on  laminate floors., Also have area rugs., Not comfortable with cane. I was at first  Primary Reason for Visit (Submitted on 10/17/2024)  What is the primary reason for your visit?: Problem Not Listed

## 2025-04-18 NOTE — ASSESSMENT & PLAN NOTE
Patient is A1c is very stable at 6.3 without treatment as of her 4/25 OV, we are fine just checking this every 6 months still.

## 2025-04-21 ENCOUNTER — TELEPHONE (OUTPATIENT)
Age: 80
End: 2025-04-21
Payer: MEDICARE

## 2025-04-21 ENCOUNTER — OFFICE VISIT (OUTPATIENT)
Dept: PODIATRY | Facility: CLINIC | Age: 80
End: 2025-04-21
Payer: MEDICARE

## 2025-04-21 VITALS
DIASTOLIC BLOOD PRESSURE: 66 MMHG | BODY MASS INDEX: 32.57 KG/M2 | HEART RATE: 93 BPM | HEIGHT: 62 IN | WEIGHT: 177 LBS | OXYGEN SATURATION: 94 % | SYSTOLIC BLOOD PRESSURE: 105 MMHG | TEMPERATURE: 96.9 F

## 2025-04-21 DIAGNOSIS — G60.9 IDIOPATHIC NEUROPATHY: Primary | ICD-10-CM

## 2025-04-21 DIAGNOSIS — M79.672 FOOT PAIN, LEFT: ICD-10-CM

## 2025-04-21 DIAGNOSIS — L60.0 ONYCHOCRYPTOSIS: ICD-10-CM

## 2025-04-21 DIAGNOSIS — L03.032 PARONYCHIA OF TOE OF LEFT FOOT: ICD-10-CM

## 2025-04-21 DIAGNOSIS — R26.2 DIFFICULTY WALKING: ICD-10-CM

## 2025-04-21 PROCEDURE — 3078F DIAST BP <80 MM HG: CPT | Performed by: PODIATRIST

## 2025-04-21 PROCEDURE — 1159F MED LIST DOCD IN RCRD: CPT | Performed by: PODIATRIST

## 2025-04-21 PROCEDURE — 11750 EXCISION NAIL&NAIL MATRIX: CPT | Performed by: PODIATRIST

## 2025-04-21 PROCEDURE — 3074F SYST BP LT 130 MM HG: CPT | Performed by: PODIATRIST

## 2025-04-21 PROCEDURE — 1160F RVW MEDS BY RX/DR IN RCRD: CPT | Performed by: PODIATRIST

## 2025-04-21 NOTE — PROGRESS NOTES
[facility] - PODIATRY    Today's Date: 04/21/25    Patient Name: Kelsey Durand  MRN: [id number]  CSN: [id number]  PCP: Chris Elder MD  Referring Provider: No ref. provider found    SUBJECTIVE     Chief Complaint   Patient presents with    Left Foot - Ingrown Toenail, Follow-up     Here for P&A procedure great toe     HPI: Kelsey Durand, a 80 y.o.female, comes to clinic.    New  Location:  Medial and Lateral borders of the Left Hallux  Duration:   >1 week  Onset:  Gradual  Nature:  sore with palpation.  Aggravating factors:  Pain with shoe gear and ambulation.  Previous Treatment: Debridement    No other pedal complaints at this time.    Patient denies any fevers, chills, nausea, vomiting, shortness of breath, nor any other constitutional signs nor symptoms.       History of Present Illness      Past Medical History:   Diagnosis Date    Allergic     Allergic rhinitis due to pollen     Anxiety     Atrophy of thyroid (acquired)     Cataract Nov 2012    Removed    Chronic fatigue     Colitis     Colon polyp     Difficulty walking 2018    Disease of thyroid gland     DM (diabetes mellitus)     Foot pain, right     Fracture, foot Apr 4 2022    Fell in yard Mon morning. Sorta loss balance. 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    Thyroid nodule     Unspecified osteoarthritis, unspecified site     Urinary tract infection Sep 2022    Vitamin D deficiency, unspecified      Past Surgical History:   Procedure Laterality Date    CATARACT EXTRACTION      12/14/2011 Left Cataract Surgery 11/30/2011 right eye cataract surgery    COLONOSCOPY      11/2020 colonoscopy    COLONOSCOPY N/A 1/15/2025    Procedure: COLONOSCOPY WITH HOT SNARE POLYPECTOMY, ELEVIEW INJECTION AND CLIPX1;   Surgeon: Denzel Streeter MD;  Location: [facility] ENDOSCOPY;  Service: Gastroenterology;  Laterality: N/A;  COLON POLYP, DIVERTICULOSIS, HEMORRHOIDS    LEEP  2000    OTHER SURGICAL HISTORY      FNA     Family History   Problem Relation Age of Onset    Breast cancer Sister     Colon cancer Daughter     Cancer Sister     Cancer Brother         Step brother. Had valve repair    Arthritis Daughter         Cancer of rectom     Social History     Socioeconomic History    Marital status:    Tobacco Use    Smoking status: Never     Passive exposure: Yes    Smokeless tobacco: Never   Vaping Use    Vaping status: Never Used   Substance and Sexual Activity    Alcohol use: Not Currently     Comment: rarely    Drug use: Never    Sexual activity: Not Currently     Partners: Male     Birth control/protection: Condom, Pill, None, Birth control pill     Comment: I am 77 years old! why!     Allergies   Allergen Reactions    Sulfa Antibiotics Rash and Hives     Current Outpatient Medications   Medication Sig Dispense Refill    acetaminophen (TYLENOL) 500 MG tablet Take 1 tablet by mouth Every 6 (Six) Hours As Needed for Mild Pain.      clotrimazole (LOTRIMIN) 1 % cream Apply 1 Application topically to the appropriate area as directed 2 (Two) Times a Day. 28 g 1    escitalopram (LEXAPRO) 5 MG tablet TAKE 1 TABLET BY MOUTH EVERY DAY 90 tablet 1    levothyroxine (SYNTHROID, LEVOTHROID) 100 MCG tablet Take 1 tablet by mouth Every Morning. 90 tablet 3    metoprolol succinate XL (TOPROL-XL) 25 MG 24 hr tablet TAKE 1 TABLET BY MOUTH EVERY DAY 90 tablet 3    multivitamin with minerals tablet tablet Take 1 tablet by mouth Daily.      simvastatin (ZOCOR) 10 MG tablet Take 1 tablet by mouth Every Other Day. 45 tablet 3     No current facility-administered medications for this visit.     Review of Systems    OBJECTIVE     Vitals:    04/21/25 0906   BP: 105/66   Pulse: 93   Temp: 96.9 °F (36.1 °C)   SpO2: 94%       PHYSICAL EXAM  GEN:       Foot/Ankle Exam    GENERAL  Appearance:  appears stated age and elderly  Orientation:  AAOx3  Affect:  appropriate  Gait:  unimpaired  Assistance:  independent  Right shoe gear: casual shoe  Left shoe gear: casual shoe    VASCULAR     Right Foot Vascularity   Dorsalis pedis:  2+  Posterior tibial:  2+  Skin temperature:  warm  Edema grading:  None  CFT:  < 3 seconds  Pedal hair growth:  Absent  Varicosities:  moderate varicosities     Left Foot Vascularity   Dorsalis pedis:  2+  Posterior tibial:  2+  Skin temperature:  warm  Edema grading:  None  CFT:  < 3 seconds  Pedal hair growth:  Absent  Varicosities:  moderate varicosities     NEUROLOGIC     Right Foot Neurologic   Light touch sensation: diminished  Vibratory sensation: diminished  Hot/Cold sensation: diminished  Protective Sensation using [place] Monofilament:   Sites intact: 5  Sites tested: 10     Left Foot Neurologic   Light touch sensation: diminished  Vibratory sensation: diminished  Hot/Cold sensation:  diminished  Protective Sensation using [place] Monofilament:   Sites intact: 5  Sites tested: 10    MUSCULOSKELETAL     Left Foot Musculoskeletal   Tenderness:  toe 1 tenderness    MUSCLE STRENGTH     Right Foot Muscle Strength   Foot dorsiflexion:  4  Foot plantar flexion:  4  Foot inversion:  4  Foot eversion:  4     Left Foot Muscle Strength   Foot dorsiflexion:  4  Foot plantar flexion:  4  Foot inversion:  4  Foot eversion:  4    RANGE OF MOTION     Right Foot Range of Motion   Foot and ankle ROM within normal limits       Left Foot Range of Motion   Foot and ankle ROM within normal limits      DERMATOLOGIC      Right Foot Dermatologic   Skin  Right foot skin is intact.      Left Foot Dermatologic   Skin  Left foot skin is intact.   Nails comment:  Left 1st bilateral nail borders  Nails  1.  Positive for ingrown toenail and paronychia. (Medial and lateral borders)      Physical Exam        ASSESSMENT/PLAN     Diagnoses and  all orders for this visit:    1. Idiopathic neuropathy (Primary)    2. Foot pain, left    3. Onychocryptosis    4. Difficulty walking    5. Paronychia of toe of left foot        Assessment & Plan      Comprehensive lower extremity examination and evaluation was performed.    Discussed findings and treatment plan including risks, benefits, and treatment options with patient in detail. Patient agreed with treatment plan.    Phenol and Alcohol Chemical Matrixectomy Procedure - This procedure is indicated for onychocryptosis of the Left Hallux Medial and Lateral nail borders. Indications, risks and benefits and alternative treatments have been discussed with this patient who has agreed to this procedure. The area was sterilely prepped with a povidone-iodine solution. The affected area was locally anesthetized with 3 ml, of 0.5% Marcaine plain. The offending nail plate was completely excised.  Next 3 applications of 89% phenol were applied to the matrix area x 30 seconds followed by irrigation with copious amounts of isopropyl alcohol.  A sterile dressing was applied. The patient tolerated the procedure well.     Patient was given post procedure care instructions.  The patient states understanding and agreement with this plan.    An After Visit Summary was printed and given to the patient at discharge, including (if requested) any available informative/educational handouts regarding diagnosis, treatment, or medications. All questions were answered to patient/family satisfaction. Should symptoms fail to improve or worsen they agree to call or return to clinic or to go to the Emergency Department. Discussed the importance of following up with any needed screening tests/labs/specialist appointments and any requested follow-up recommended by me today. Importance of maintaining follow-up discussed and patient accepts that missed appointments can delay diagnosis and potentially lead to worsening of conditions.    Return in  about 2 weeks (around 5/5/2025) for Post-Procedure., or sooner if acute issues arise.    This document has been electronically signed by Bin Vo DPM on April 21, 2025 09:32 EDT

## 2025-04-21 NOTE — TELEPHONE ENCOUNTER
Caller: MARINA BANUELOS    Relationship: Emergency Contact    Best call back number: [phone]     What orders are you requesting (i.e. lab or imaging): URINALYSIS    In what timeframe would the patient need to come in: ASAP    Where will you receive your lab/imaging services: [facility]    Additional notes: PATIENT WOULD LIKE A CALL WHEN THE ORDER HAS BEEN PLACED

## 2025-04-21 NOTE — TELEPHONE ENCOUNTER
Pt's daughter states that she doesn't feel that she needs this any longer, they will call back if they do.

## 2025-04-29 ENCOUNTER — TELEPHONE (OUTPATIENT)
Dept: PODIATRY | Facility: CLINIC | Age: 80
End: 2025-04-29
Payer: MEDICARE

## 2025-04-29 NOTE — TELEPHONE ENCOUNTER
Caller: Kelsey Durand    Relationship: Self    Best call back number: [phone]    What is the best time to reach you: ANY     Who are you requesting to speak with (clinical staff, provider,  specific staff member): CLINICAL     Do you know the name of the person who called: YES     What was the call regarding: PATIENT STATES THAT SHE HAS SOME REDNESS ON THE OUTSIDE OF THE TOES FROM THE REMOVAL OF THE INGROWN NAILS- THERE IS NOT WARMNESS, AND ONLY SLIGHT PAIN - PATIENT STATES THAT SHE WILL TRY TO ATTACH A PICTURE

## 2025-04-29 NOTE — TELEPHONE ENCOUNTER
Spoke with patient who states she had not been doing soaks twice daily and was applying a large ampont of antibiotic to her toes. Advised to do soaks twice daily an a very small amount of triple antibiotic to the area. Advised if this does not improve to call the office and will give further instructions.

## 2025-05-05 ENCOUNTER — OFFICE VISIT (OUTPATIENT)
Dept: PODIATRY | Facility: CLINIC | Age: 80
End: 2025-05-05
Payer: MEDICARE

## 2025-05-05 VITALS
WEIGHT: 177 LBS | HEART RATE: 65 BPM | OXYGEN SATURATION: 93 % | SYSTOLIC BLOOD PRESSURE: 136 MMHG | DIASTOLIC BLOOD PRESSURE: 76 MMHG | BODY MASS INDEX: 32.57 KG/M2 | HEIGHT: 62 IN

## 2025-05-05 DIAGNOSIS — R26.2 DIFFICULTY WALKING: ICD-10-CM

## 2025-05-05 DIAGNOSIS — L03.032 PARONYCHIA OF TOE OF LEFT FOOT: ICD-10-CM

## 2025-05-05 DIAGNOSIS — L60.0 ONYCHOCRYPTOSIS: ICD-10-CM

## 2025-05-05 DIAGNOSIS — M79.672 FOOT PAIN, LEFT: ICD-10-CM

## 2025-05-05 DIAGNOSIS — G60.9 IDIOPATHIC NEUROPATHY: Primary | ICD-10-CM

## 2025-05-05 PROCEDURE — 99212 OFFICE O/P EST SF 10 MIN: CPT | Performed by: PODIATRIST

## 2025-05-05 PROCEDURE — 1160F RVW MEDS BY RX/DR IN RCRD: CPT | Performed by: PODIATRIST

## 2025-05-05 PROCEDURE — 3078F DIAST BP <80 MM HG: CPT | Performed by: PODIATRIST

## 2025-05-05 PROCEDURE — 1159F MED LIST DOCD IN RCRD: CPT | Performed by: PODIATRIST

## 2025-05-05 PROCEDURE — 3075F SYST BP GE 130 - 139MM HG: CPT | Performed by: PODIATRIST

## 2025-05-05 NOTE — PROGRESS NOTES
[facility] - PODIATRY    Today's Date: 05/05/25    Patient Name: Kelsey Durand  MRN: [id number]  CSN: [id number]  PCP: Chris Elder MD  Referring Provider: No ref. provider found    SUBJECTIVE     Chief Complaint   Patient presents with    Left Foot - Follow-up, Ingrown Toenail     P&A follow up       HPI: Kelsey Durand, a 80 y.o.female, comes to clinic.    New, Established, New Problem:  established  Location: Medial and lateral borders of the Left great toe  Duration:     Onset:  Gradual  Nature:  sore with palpation.  Stable, worsening, improving:   improving  Aggravating factors:  Pain with shoe gear and ambulation.  Previous Treatment: Chemical matrixectomies    No other pedal complaints at this time.    Patient denies any fevers, chills, nausea, vomiting, shortness of breath, nor any other constitutional signs nor symptoms.       Past Medical History:   Diagnosis Date    Allergic     Allergic rhinitis due to pollen     Anxiety     Atrophy of thyroid (acquired)     Cataract Nov 2012    Removed    Chronic fatigue     Colitis     Colon polyp     Difficulty walking 2018    Disease of thyroid gland     DM (diabetes mellitus)     Foot pain, right     Fracture, foot Apr 4 2022    Fell in yard Mon morning. Sorta loss balance. 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    Thyroid nodule     Unspecified osteoarthritis, unspecified site     Urinary tract infection Sep 2022    Vitamin D deficiency, unspecified      Past Surgical History:   Procedure Laterality Date    CATARACT EXTRACTION      12/14/2011 Left Cataract Surgery 11/30/2011 right eye cataract surgery    COLONOSCOPY      11/2020 colonoscopy    COLONOSCOPY N/A 1/15/2025    Procedure:  COLONOSCOPY WITH HOT SNARE POLYPECTOMY, ELEVIEW INJECTION AND CLIPX1;  Surgeon: Denzel Streeter MD;  Location: [facility] ENDOSCOPY;  Service: Gastroenterology;  Laterality: N/A;  COLON POLYP, DIVERTICULOSIS, HEMORRHOIDS    LEEP  2000    OTHER SURGICAL HISTORY      FNA     Family History   Problem Relation Age of Onset    Breast cancer Sister     Colon cancer Daughter     Cancer Sister     Cancer Brother         Step brother. Had valve repair    Arthritis Daughter         Cancer of rectom     Social History     Socioeconomic History    Marital status:    Tobacco Use    Smoking status: Never     Passive exposure: Yes    Smokeless tobacco: Never   Vaping Use    Vaping status: Never Used   Substance and Sexual Activity    Alcohol use: Not Currently     Comment: rarely    Drug use: Never    Sexual activity: Not Currently     Partners: Male     Birth control/protection: Condom, Pill, None, Birth control pill     Comment: I am 77 years old! why!     Allergies   Allergen Reactions    Sulfa Antibiotics Rash and Hives     Current Outpatient Medications   Medication Sig Dispense Refill    acetaminophen (TYLENOL) 500 MG tablet Take 1 tablet by mouth Every 6 (Six) Hours As Needed for Mild Pain.      clotrimazole (LOTRIMIN) 1 % cream Apply 1 Application topically to the appropriate area as directed 2 (Two) Times a Day. 28 g 1    escitalopram (LEXAPRO) 5 MG tablet TAKE 1 TABLET BY MOUTH EVERY DAY 90 tablet 1    levothyroxine (SYNTHROID, LEVOTHROID) 100 MCG tablet Take 1 tablet by mouth Every Morning. 90 tablet 3    metoprolol succinate XL (TOPROL-XL) 25 MG 24 hr tablet TAKE 1 TABLET BY MOUTH EVERY DAY 90 tablet 3    multivitamin with minerals tablet tablet Take 1 tablet by mouth Daily.      simvastatin (ZOCOR) 10 MG tablet Take 1 tablet by mouth Every Other Day. 45 tablet 3     No current facility-administered medications for this visit.       OBJECTIVE     Vitals:    05/05/25 1042   BP: 136/76   Pulse: 65   SpO2: 93%        PHYSICAL EXAM     Neuro Status:  Unchanged  Vascular Status:  Unchanged  Musculoskeletal Status:  Unchanged  Derm Status: Left great toe shows medial and lateral border matrixectomy sites healing without complications.  No signs of edema, erythema, lymphangitis, nor signs of infection.    ASSESSMENT/PLAN     Diagnoses and all orders for this visit:    1. Idiopathic neuropathy (Primary)    2. Foot pain, left    3. Onychocryptosis    4. Difficulty walking    5. Paronychia of toe of left foot        Comprehensive lower extremity examination and evaluation was performed.    Discussed findings and treatment plan including risks, benefits, and treatment options with patient in detail. Patient agreed with treatment plan.    The involved nail borders were cleaned with peroxide and all nonviable tissue was removed.  Topical antibiotic and a sterile dressing was applied. The patient tolerated the procedure well.     Patient was given post procedure care instructions.  The patient states understanding and agreement with this plan.    Return if symptoms worsen or fail to improve.    An After Visit Summary was printed and given to the patient at discharge, including (if requested) any available informative/educational handouts regarding diagnosis, treatment, or medications. All questions were answered to patient/family satisfaction. Should symptoms fail to improve or worsen they agree to call or return to clinic or to go to the Emergency Department. Discussed the importance of following up with any needed screening tests/labs/specialist appointments and any requested follow-up recommended by me today. Importance of maintaining follow-up discussed and patient accepts that missed appointments can delay diagnosis and potentially lead to worsening of conditions.    Return if symptoms worsen or fail to improve., or sooner if acute issues arise.    This document has been electronically signed by Bin Vo DPM on May 5,  2025 10:59 EDT

## 2025-05-07 ENCOUNTER — RESULTS FOLLOW-UP (OUTPATIENT)
Dept: INTERNAL MEDICINE | Age: 80
End: 2025-05-07
Payer: MEDICARE

## 2025-05-07 ENCOUNTER — HOSPITAL ENCOUNTER (OUTPATIENT)
Dept: MAMMOGRAPHY | Facility: HOSPITAL | Age: 80
Discharge: HOME | End: 2025-05-07
Admitting: INTERNAL MEDICINE
Payer: MEDICARE

## 2025-05-07 DIAGNOSIS — Z12.31 SCREENING MAMMOGRAM FOR BREAST CANCER: ICD-10-CM

## 2025-05-07 PROCEDURE — 77063 BREAST TOMOSYNTHESIS BI: CPT

## 2025-05-07 PROCEDURE — 77067 SCR MAMMO BI INCL CAD: CPT

## 2025-05-08 ENCOUNTER — TELEPHONE (OUTPATIENT)
Age: 80
End: 2025-05-08
Payer: MEDICARE

## 2025-05-08 DIAGNOSIS — Z12.31 ENCOUNTER FOR SCREENING MAMMOGRAM FOR BREAST CANCER: Primary | ICD-10-CM

## 2025-05-13 ENCOUNTER — TELEPHONE (OUTPATIENT)
Age: 80
End: 2025-05-13

## 2025-05-13 NOTE — TELEPHONE ENCOUNTER
Caller: MARINA BANUELOS    Relationship to patient: Emergency Contact    Best call back number: [phone]     Patient is needing: PATIENTS DAUGHTER CALLED STATING THE PATIENT IS ALWAYS COMPLAINING SHE IS SLEEPY AND COLD. PATIENTS DAUGHTER SAID THAT THE PATIENT PULLED THE CAR INTO THE GARAGE CLOSED THE GARAGE DOOR TURNED THE CAR OFF BUT FELL ASLEEP IN THE CAR.    PATIENTS DAUGHTER IS WORRIED ABOUT THIS BEHAVIOR AS IT COULD HAVE TURNED OUT BADLY AND WOULD LIKE A CALL TO DISCUSS

## 2025-05-14 NOTE — TELEPHONE ENCOUNTER
Spoke with daughter and relayed all of this info.  She verbalized understanding and will pass this along to her Mom.

## 2025-05-14 NOTE — TELEPHONE ENCOUNTER
Please let patient and daughter know that I put in orders for blood and urine test that can be done today.  All her labs were normal at her appointment about 6 weeks or so ago, but certainly reasonable to repeat and check a few additional.  Additionally I put in an order for a head CT, this does not need to be stat, but should be within the next week or so please.  Lastly I put in a referral on a urgent basis to the sleep clinic as we certainly need to rule out sleep apnea being the etiology of her symptoms, particularly if none of the other studies are pointing to the diagnosis.  She is not on any sedating prescription medications, so we need to make sure she is not on any sedating medications over-the-counter, no antihistamines, no sleep aids, etc.  Lastly, the patient should not be driving until all of the above has been addressed.

## 2025-05-23 ENCOUNTER — LAB (OUTPATIENT)
Dept: LAB | Facility: HOSPITAL | Age: 80
End: 2025-05-23
Payer: MEDICARE

## 2025-05-23 ENCOUNTER — HOSPITAL ENCOUNTER (OUTPATIENT)
Dept: CT IMAGING | Facility: HOSPITAL | Age: 80
Discharge: HOME | End: 2025-05-23
Payer: MEDICARE

## 2025-05-23 DIAGNOSIS — G47.419 SLEEP ATTACK: ICD-10-CM

## 2025-05-23 DIAGNOSIS — R53.83 OTHER FATIGUE: ICD-10-CM

## 2025-05-23 DIAGNOSIS — Z00.00 WELL ADULT EXAM: ICD-10-CM

## 2025-05-23 DIAGNOSIS — E03.4 ACQUIRED ATROPHY OF THYROID: ICD-10-CM

## 2025-05-23 LAB
ALBUMIN SERPL-MCNC: 3.8 G/DL (ref 3.5–5.2)
ALBUMIN/GLOB SERPL: 1.1 G/DL
ALP SERPL-CCNC: 58 U/L (ref 39–117)
ALT SERPL W P-5'-P-CCNC: 18 U/L (ref 1–33)
ANION GAP SERPL CALCULATED.3IONS-SCNC: 7.7 MMOL/L (ref 5–15)
AST SERPL-CCNC: 22 U/L (ref 1–32)
BASOPHILS # BLD AUTO: 0.05 10*3/MM3 (ref 0–0.2)
BASOPHILS NFR BLD AUTO: 1.1 % (ref 0–1.5)
BILIRUB SERPL-MCNC: 0.6 MG/DL (ref 0–1.2)
BILIRUB UR QL STRIP: NEGATIVE
BUN SERPL-MCNC: 16 MG/DL (ref 8–23)
BUN/CREAT SERPL: 22.2 (ref 7–25)
CALCIUM SPEC-SCNC: 9.3 MG/DL (ref 8.6–10.5)
CHLORIDE SERPL-SCNC: 103 MMOL/L (ref 98–107)
CLARITY UR: CLEAR
CO2 SERPL-SCNC: 26.3 MMOL/L (ref 22–29)
COLOR UR: YELLOW
CREAT SERPL-MCNC: 0.72 MG/DL (ref 0.57–1)
DEPRECATED RDW RBC AUTO: 47.7 FL (ref 37–54)
EGFRCR SERPLBLD CKD-EPI 2021: 83.6 ML/MIN/1.73
EOSINOPHIL # BLD AUTO: 0.17 10*3/MM3 (ref 0–0.4)
EOSINOPHIL NFR BLD AUTO: 3.7 % (ref 0.3–6.2)
ERYTHROCYTE [DISTWIDTH] IN BLOOD BY AUTOMATED COUNT: 13.5 % (ref 12.3–15.4)
FERRITIN SERPL-MCNC: 263 NG/ML (ref 13–150)
FOLATE SERPL-MCNC: 16.6 NG/ML (ref 4.78–24.2)
GLOBULIN UR ELPH-MCNC: 3.5 GM/DL
GLUCOSE SERPL-MCNC: 96 MG/DL (ref 65–99)
GLUCOSE UR STRIP-MCNC: NEGATIVE MG/DL
HCT VFR BLD AUTO: 42.9 % (ref 34–46.6)
HGB BLD-MCNC: 13.5 G/DL (ref 12–15.9)
HGB UR QL STRIP.AUTO: NEGATIVE
HOLD SPECIMEN: NORMAL
IMM GRANULOCYTES # BLD AUTO: 0.05 10*3/MM3 (ref 0–0.05)
IMM GRANULOCYTES NFR BLD AUTO: 1.1 % (ref 0–0.5)
IRON 24H UR-MRATE: 102 MCG/DL (ref 37–145)
IRON SATN MFR SERPL: 30 % (ref 20–50)
KETONES UR QL STRIP: NEGATIVE
LEUKOCYTE ESTERASE UR QL STRIP.AUTO: NEGATIVE
LYMPHOCYTES # BLD AUTO: 0.93 10*3/MM3 (ref 0.7–3.1)
LYMPHOCYTES NFR BLD AUTO: 20.1 % (ref 19.6–45.3)
MCH RBC QN AUTO: 29.9 PG (ref 26.6–33)
MCHC RBC AUTO-ENTMCNC: 31.5 G/DL (ref 31.5–35.7)
MCV RBC AUTO: 94.9 FL (ref 79–97)
MONOCYTES # BLD AUTO: 0.44 10*3/MM3 (ref 0.1–0.9)
MONOCYTES NFR BLD AUTO: 9.5 % (ref 5–12)
NEUTROPHILS NFR BLD AUTO: 2.98 10*3/MM3 (ref 1.7–7)
NEUTROPHILS NFR BLD AUTO: 64.5 % (ref 42.7–76)
NITRITE UR QL STRIP: NEGATIVE
NRBC BLD AUTO-RTO: 0 /100 WBC (ref 0–0.2)
PH UR STRIP.AUTO: 6.5 [PH] (ref 5–8)
PLATELET # BLD AUTO: 250 10*3/MM3 (ref 140–450)
PMV BLD AUTO: 10.7 FL (ref 6–12)
POTASSIUM SERPL-SCNC: 4.3 MMOL/L (ref 3.5–5.2)
PROT SERPL-MCNC: 7.3 G/DL (ref 6–8.5)
PROT UR QL STRIP: NEGATIVE
RBC # BLD AUTO: 4.52 10*6/MM3 (ref 3.77–5.28)
SODIUM SERPL-SCNC: 137 MMOL/L (ref 136–145)
SP GR UR STRIP: 1.02 (ref 1–1.03)
TIBC SERPL-MCNC: 340 MCG/DL (ref 298–536)
TRANSFERRIN SERPL-MCNC: 228 MG/DL (ref 200–360)
TSH SERPL DL<=0.05 MIU/L-ACNC: 1.48 UIU/ML (ref 0.27–4.2)
UROBILINOGEN UR QL STRIP: NORMAL
VIT B12 BLD-MCNC: 304 PG/ML (ref 211–946)
WBC NRBC COR # BLD AUTO: 4.62 10*3/MM3 (ref 3.4–10.8)

## 2025-05-23 PROCEDURE — 85025 COMPLETE CBC W/AUTO DIFF WBC: CPT

## 2025-05-23 PROCEDURE — 70450 CT HEAD/BRAIN W/O DYE: CPT

## 2025-05-23 PROCEDURE — 82607 VITAMIN B-12: CPT

## 2025-05-23 PROCEDURE — 36415 COLL VENOUS BLD VENIPUNCTURE: CPT

## 2025-05-23 PROCEDURE — 81003 URINALYSIS AUTO W/O SCOPE: CPT

## 2025-05-23 PROCEDURE — 84466 ASSAY OF TRANSFERRIN: CPT

## 2025-05-23 PROCEDURE — 82746 ASSAY OF FOLIC ACID SERUM: CPT

## 2025-05-23 PROCEDURE — 82728 ASSAY OF FERRITIN: CPT

## 2025-05-23 PROCEDURE — 80053 COMPREHEN METABOLIC PANEL: CPT

## 2025-05-23 PROCEDURE — 84443 ASSAY THYROID STIM HORMONE: CPT

## 2025-05-23 PROCEDURE — 83540 ASSAY OF IRON: CPT

## 2025-05-27 RX ORDER — SIMVASTATIN 10 MG
10 TABLET ORAL
Qty: 45 TABLET | Refills: 4 | Status: SHIPPED | OUTPATIENT
Start: 2025-05-27

## 2025-06-10 ENCOUNTER — OFFICE VISIT (OUTPATIENT)
Dept: SLEEP MEDICINE | Facility: HOSPITAL | Age: 80
End: 2025-06-10
Payer: MEDICARE

## 2025-06-10 VITALS
OXYGEN SATURATION: 93 % | HEART RATE: 73 BPM | DIASTOLIC BLOOD PRESSURE: 67 MMHG | BODY MASS INDEX: 32.77 KG/M2 | HEIGHT: 62 IN | SYSTOLIC BLOOD PRESSURE: 122 MMHG | WEIGHT: 178.1 LBS

## 2025-06-10 DIAGNOSIS — I10 PRIMARY HYPERTENSION: Primary | ICD-10-CM

## 2025-06-10 DIAGNOSIS — E66.9 OBESITY (BMI 30-39.9): ICD-10-CM

## 2025-06-10 DIAGNOSIS — I48.91 LONE ATRIAL FIBRILLATION: ICD-10-CM

## 2025-06-10 DIAGNOSIS — G47.10 HYPERSOMNIA: ICD-10-CM

## 2025-06-10 PROCEDURE — 3074F SYST BP LT 130 MM HG: CPT | Performed by: INTERNAL MEDICINE

## 2025-06-10 PROCEDURE — G0463 HOSPITAL OUTPT CLINIC VISIT: HCPCS

## 2025-06-10 PROCEDURE — 3078F DIAST BP <80 MM HG: CPT | Performed by: INTERNAL MEDICINE

## 2025-06-10 NOTE — PROGRESS NOTES
Sleep Consultation    Patient Name: Kelsey Durand  Age/Sex: 80 y.o. female  : 1945  MRN: [id number]    Date of Encounter Visit: 06/10/2025  Encounter Provider: Rosi Stafford MD  Referring Provider: Chris Elder MD  Place of Service: [facility]  Patient Care Team:  Chris Elder MD as PCP - General (Internal Medicine)  Denzel Streeter MD as Consulting Physician (Gastroenterology)  Bin Vo DPM as Consulting Physician (Podiatry)  Darren Rahman MD as Consulting Physician (Ophthalmology)    Subjective:     Reason for Consult: evaluate for questionable hypersomnia     History of Present Illness:  Kelsey Durand is a 80 y.o. female is here for evaluation of MELY due to  concerns initiated by the family .    Patient Nuys any significant daytime fatigue and sleepiness with an [place] Sleepiness Scale (ESS) of 2.  Patient has no specific sleep complaints  She is pretty sure she does not snore   Typical bedtime is around 11, wake up time and is 9 hours later with sleep onset of 50 minutes or less and she wakes up feeling okay  No significant caffeine abuse, no alcohol or smoking or illicit substance abuse.  Denies any symptoms of restless leg syndrome.   Patient denies any cataplexy, sleep paralysis or other symptoms to suggest narcolepsy.  Patient denies any parasomnias.  Denies any history of seizure disorder or recent head trauma.  Patient spends adequate amount of time in bed with no evidence of sleep restriction or improper sleep hygiene.    Comorbidities include: Hypertension, arthritis    Review of Systems:   A twelve-system review was conducted and was negative except for the following: Cold intolerance.        Past Medical History:  Past Medical History:   Diagnosis Date    Allergic     Allergic rhinitis due to pollen     Anxiety     Atrophy of thyroid (acquired)     Cataract 2012    Removed    Chronic fatigue     Colitis     Colon  polyp     Difficulty walking 2018    Disease of thyroid gland     DM (diabetes mellitus)     Foot pain, right     Fracture, foot Apr 4 2022    Fell in yard Mon morning. Sorta loss balance. 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    Thyroid nodule     Unspecified osteoarthritis, unspecified site     Urinary tract infection Sep 2022    Vitamin D deficiency, unspecified        Past Surgical History:   Procedure Laterality Date    CATARACT EXTRACTION      12/14/2011 Left Cataract Surgery 11/30/2011 right eye cataract surgery    COLONOSCOPY      11/2020 colonoscopy    COLONOSCOPY N/A 1/15/2025    Procedure: COLONOSCOPY WITH HOT SNARE POLYPECTOMY, ELEVIEW INJECTION AND CLIPX1;  Surgeon: Denzel Streeter MD;  Location: [facility] ENDOSCOPY;  Service: Gastroenterology;  Laterality: N/A;  COLON POLYP, DIVERTICULOSIS, HEMORRHOIDS    LEEP  2000    OTHER SURGICAL HISTORY      FNA       Home Medications:     Current Outpatient Medications:     acetaminophen (TYLENOL) 500 MG tablet, Take 1 tablet by mouth Every 6 (Six) Hours As Needed for Mild Pain., Disp: , Rfl:     clotrimazole (LOTRIMIN) 1 % cream, Apply 1 Application topically to the appropriate area as directed 2 (Two) Times a Day., Disp: 28 g, Rfl: 1    escitalopram (LEXAPRO) 5 MG tablet, TAKE 1 TABLET BY MOUTH EVERY DAY, Disp: 90 tablet, Rfl: 1    levothyroxine (SYNTHROID, LEVOTHROID) 100 MCG tablet, Take 1 tablet by mouth Every Morning., Disp: 90 tablet, Rfl: 3    metoprolol succinate XL (TOPROL-XL) 25 MG 24 hr tablet, TAKE 1 TABLET BY MOUTH EVERY DAY, Disp: 90 tablet, Rfl: 3    multivitamin with minerals tablet tablet, Take 1 tablet by mouth Daily., Disp: , Rfl:     simvastatin (ZOCOR) 10 MG tablet, TAKE 1 TABLET BY MOUTH EVERY  "OTHER DAY, Disp: 45 tablet, Rfl: 4    Allergies:  Allergies   Allergen Reactions    Sulfa Antibiotics Rash and Hives       Past Social History:  Social History     Socioeconomic History    Marital status:    Tobacco Use    Smoking status: Never     Passive exposure: Yes    Smokeless tobacco: Never   Vaping Use    Vaping status: Never Used   Substance and Sexual Activity    Alcohol use: Not Currently     Comment: rarely    Drug use: Never    Sexual activity: Not Currently     Partners: Male     Birth control/protection: Condom, Pill, None, Birth control pill     Comment: I am 77 years old! why!       Past Family History:  Family History   Problem Relation Age of Onset    Breast cancer Sister     Colon cancer Daughter     Cancer Sister     Cancer Brother         Step brother. Had valve repair    Arthritis Daughter         Cancer of rectom     Negative   Objective:        Vital Signs:   Visit Vitals  /67   Pulse 73   Ht 157.5 cm (62\")   Wt 80.8 kg (178 lb 1.6 oz)   SpO2 93%   BMI 32.57 kg/m²     Wt Readings from Last 3 Encounters:   06/10/25 80.8 kg (178 lb 1.6 oz)   05/05/25 80.3 kg (177 lb)   04/21/25 80.3 kg (177 lb)     Neck Circumference: 13.25 inches    Physical Exam:   GEN:  No acute distress, alert, cooperative, well developed. Uses walker   EYES:   Sclerae clear. No icterus. PERRL. Normal EOM  ENT:   External ears/nose normal, no oral lesions, no thrush, mucous membranes moist, Septum midline. Mallampati II airway.     NECK:  Supple, midline trachea, no JVD  LUNGS: Normal chest on inspection, CTAB, no wheezes. No rhonchi. No crackles. Respirations regular, even and unlabored.   CV:  Regular rhythm and rate. Normal S1/S2. No murmurs, gallops, or rubs noted.  ABD:  Soft, nontender and nondistended. Normal bowel sounds. No guarding  EXT:  Moves all extremities well. No cyanosis. No redness. 1+ pitting edema.   Skin: Dry, intact, no bleeding      Diagnostic Data:  ECHO 11/3/2023:   Left ventricular " diastolic function is consistent with (grade I) impaired relaxation.     Assessment and Plan:       ICD-10-CM ICD-9-CM   1. Primary hypertension  I10 401.9   2. 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 Obesity (BMI 30-39.9)  E66.9 278.00   4. Hypersomnia  G47.10 780.54       Recommendations:     Patient denies any significant symptom, she is certain that she does not snore, she had pretty normal [place] Sleepiness Scale however the family have a different opinion about that specially when she was found asleep in her car, the patient is denying that and she is affirming that she was just resting in her car.  Her exam is showing a low grade Mallampati at 2.  And she has history of lone A-fib during a COVID episode and no other atrial fibrillation or arrhythmias or any significant cardiovascular risks.  A sleep study is not going to be required in her case however to be on the safe side, we will do an overnight oximetry, will consider further evaluation with a home sleep study if we see any suspicious desaturations.  Patient is even reluctant to consider the overnight oximetry but that would be the least invasive and the most convenient initial screening test I can do that we will get us the reassurance were looking for or that will trigger the need for more detailed investigation.    Patient was educated in depth about MELY and cardiovascular consequences if left untreated, including but not limited to CHF, CAD, arrhythmias, CVA, and/or HTN. Education also provided about the diagnostic tools for MELY, including the polysomnography and the treatment modalities, including the CPAP.     If patient has obstructive sleep apnea the recommend treatment is CPAP and will start CPAP and patient will follow up within 31-90 days after starting CPAP for compliance review.   Will address alternative treatment option if intolerant to CPAP     Adherence to the CPAP is a key factor in successful treatment of MELY and the patient was  encouraged to contact us in case of problem with the CPAP or the mask that can limit the tolerance of the compliance with the therapy.    Patient was educated about the impact of obesity on sleep apnea and the benefit of weight loss and weight loss was recommended    Orders Placed This Encounter   Procedures    Overnight Sleep Oximetry Study     No orders of the defined types were placed in this encounter.     Return in about 2 weeks (around 6/24/2025).    Rosi Stafford MD   [facility]   06/10/25  11:16 EDT    Dictated utilizing Dragon dictation

## 2025-06-23 RX ORDER — LEVOTHYROXINE SODIUM 100 UG/1
100 TABLET ORAL EVERY MORNING
Qty: 90 TABLET | Refills: 3 | Status: SHIPPED | OUTPATIENT
Start: 2025-06-23

## 2025-06-24 ENCOUNTER — OFFICE VISIT (OUTPATIENT)
Dept: SLEEP MEDICINE | Facility: HOSPITAL | Age: 80
End: 2025-06-24
Payer: MEDICARE

## 2025-06-24 VITALS
WEIGHT: 185.2 LBS | OXYGEN SATURATION: 94 % | SYSTOLIC BLOOD PRESSURE: 118 MMHG | HEIGHT: 62 IN | BODY MASS INDEX: 34.08 KG/M2 | HEART RATE: 64 BPM | DIASTOLIC BLOOD PRESSURE: 55 MMHG

## 2025-06-24 DIAGNOSIS — I10 PRIMARY HYPERTENSION: ICD-10-CM

## 2025-06-24 DIAGNOSIS — I48.91 LONE ATRIAL FIBRILLATION: ICD-10-CM

## 2025-06-24 DIAGNOSIS — R29.818 SUSPECTED SLEEP APNEA: Primary | ICD-10-CM

## 2025-06-24 DIAGNOSIS — G47.34 SLEEP RELATED HYPOXIA: ICD-10-CM

## 2025-06-24 PROCEDURE — 3078F DIAST BP <80 MM HG: CPT | Performed by: INTERNAL MEDICINE

## 2025-06-24 PROCEDURE — 3074F SYST BP LT 130 MM HG: CPT | Performed by: INTERNAL MEDICINE

## 2025-06-24 PROCEDURE — 1159F MED LIST DOCD IN RCRD: CPT | Performed by: INTERNAL MEDICINE

## 2025-06-24 PROCEDURE — 1160F RVW MEDS BY RX/DR IN RCRD: CPT | Performed by: INTERNAL MEDICINE

## 2025-06-24 PROCEDURE — G0463 HOSPITAL OUTPT CLINIC VISIT: HCPCS

## 2025-06-24 NOTE — PROGRESS NOTES
Sleep Disorder Follow Up    Patient Name: Kelsey Durand  Age/Sex: 80 y.o. female  : 1945  MRN: [id number]    Date of Encounter Visit: 25   Referring Provider: Chris Elder MD  Place of Service: [facility]  Patient Care Team:  Chris Elder MD as PCP - General (Internal Medicine)  Syl, Denzel Rowe MD as Consulting Physician (Gastroenterology)  Bin Vo DPM as Consulting Physician (Podiatry)  Darren Rahman MD as Consulting Physician (Ophthalmology)    PROBLEM LIST:  Sleep-related hypoxemia  Suspected sleep apnea  Reported hypersomnia    History of Present Illness:  Kelsey Durand is a 80 y.o. female who is here for follow up on overnight oximetry. Patient was last seen in the office on 6/10/2025.  Since last visit, patient had the overnight oximetry that was grossly abnormal as summarized below, and patient is here to discuss the results.[place] Sleepiness Scale (ESS) is 2.  Compliance download was reviewed and documented below.  Weight is up by 7 pounds since last visit.  Other comorbidities include Hypertension, arthritis, lone atrial fibrillation    Review of Systems:   A ten-system review was conducted and was negative except as mentioned above       Past Medical History:  Past medical, surgical, social, and family history, except as mentioned above, was unchanged from the last visit.     Past Medical History:   Diagnosis Date    Allergic     Allergic rhinitis due to pollen     Anxiety     Atrophy of thyroid (acquired)     Cataract 2012    Removed    Chronic fatigue     Colitis     Colon polyp     Difficulty walking     Disease of thyroid gland     DM (diabetes mellitus)     Foot pain, right     Fracture, foot 2022    Fell in yard Mon morning. Sorta loss balance. 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    Thyroid nodule     Unspecified osteoarthritis, unspecified site     Urinary tract infection Sep 2022    Vitamin D deficiency, unspecified        Past Surgical History:   Procedure Laterality Date    CATARACT EXTRACTION      12/14/2011 Left Cataract Surgery 11/30/2011 right eye cataract surgery    COLONOSCOPY      11/2020 colonoscopy    COLONOSCOPY N/A 1/15/2025    Procedure: COLONOSCOPY WITH HOT SNARE POLYPECTOMY, ELEVIEW INJECTION AND CLIPX1;  Surgeon: Denzel Streeter MD;  Location: [facility] ENDOSCOPY;  Service: Gastroenterology;  Laterality: N/A;  COLON POLYP, DIVERTICULOSIS, HEMORRHOIDS    LEEP  2000    OTHER SURGICAL HISTORY      FNA       Home Medications:     Current Outpatient Medications:     acetaminophen (TYLENOL) 500 MG tablet, Take 1 tablet by mouth Every 6 (Six) Hours As Needed for Mild Pain., Disp: , Rfl:     clotrimazole (LOTRIMIN) 1 % cream, Apply 1 Application topically to the appropriate area as directed 2 (Two) Times a Day., Disp: 28 g, Rfl: 1    escitalopram (LEXAPRO) 5 MG tablet, TAKE 1 TABLET BY MOUTH EVERY DAY, Disp: 90 tablet, Rfl: 1    levothyroxine (SYNTHROID, LEVOTHROID) 100 MCG tablet, TAKE 1 TABLET BY MOUTH EVERY MORNING, Disp: 90 tablet, Rfl: 3    metoprolol succinate XL (TOPROL-XL) 25 MG 24 hr tablet, TAKE 1 TABLET BY MOUTH EVERY DAY, Disp: 90 tablet, Rfl: 3    multivitamin with minerals tablet tablet, Take 1 tablet by mouth Daily., Disp: , Rfl:     simvastatin (ZOCOR) 10 MG tablet, TAKE 1 TABLET BY MOUTH EVERY OTHER DAY, Disp: 45 tablet, Rfl: 4    Allergies:  Allergies   Allergen Reactions    Sulfa Antibiotics Rash and Hives       Past Social History:  Social History     Socioeconomic History    Marital status:    Tobacco Use    Smoking status: Never     Passive exposure: Yes    Smokeless tobacco: Never   Vaping Use    Vaping status: Never Used  "  Substance and Sexual Activity    Alcohol use: Not Currently     Comment: rarely    Drug use: Never    Sexual activity: Not Currently     Partners: Male     Birth control/protection: Condom, Pill, None, Birth control pill     Comment: I am 77 years old! why!       Past Family History:  Family History   Problem Relation Age of Onset    Breast cancer Sister     Colon cancer Daughter     Cancer Sister     Cancer Brother         Step brother. Had valve repair    Arthritis Daughter         Cancer of rectom         Objective:        Vital Signs:   Visit Vitals  /55   Pulse 64   Ht 157.5 cm (62.01\")   Wt 84 kg (185 lb 3.2 oz)   SpO2 94%   BMI 33.86 kg/m²     Wt Readings from Last 3 Encounters:   06/24/25 84 kg (185 lb 3.2 oz)   06/10/25 80.8 kg (178 lb 1.6 oz)   05/05/25 80.3 kg (177 lb)          Physical Exam:   GEN:  No acute distress, alert, cooperative, well developed. Uses walker   EYES:   Sclerae clear. No icterus. PERRL. Normal EOM  ENT:   External ears/nose normal, no oral lesions, no thrush, mucous membranes moist, Septum midline. Mallampati II airway.     NECK:  Supple, midline trachea, no JVD  LUNGS: Normal chest on inspection, CTAB, no wheezes. No rhonchi. No crackles. Respirations regular, even and unlabored.   CV:  Regular rhythm and rate. Normal S1/S2. No murmurs, gallops, or rubs noted.  ABD:  Soft, nontender and nondistended. Normal bowel sounds. No guarding  EXT:  Moves all extremities well. No cyanosis. No redness. 1+ pitting edema.   Skin: Dry, intact, no bleeding      Diagnostic Data:  ECHO 11/3/2023:   Left ventricular diastolic function is consistent with (grade I) impaired relaxation.     Overnight oximetry on room air 6/11/2025  5 hours and 48 minutes of recording  Patient had clusters of desaturation with clinically significant hypoxemia with 1 hour and 6 minutes spent in hypoxemic range below 90%  The pattern is abnormal and further evaluation with an in lab polysomnography is " recommended      Assessment and Plan:       ICD-10-CM ICD-9-CM   1. Suspected sleep apnea  R29.818 781.99   2. Sleep related hypoxia  G47.34 327.24   3. 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 Primary hypertension  I10 401.9       Recommendations:     I did review the overnight oximetry with the patient in details and explained the abnormal pattern and the concern about the lack of sleep apnea given the frequent back-to-back desaturations.  Patient is very skeptical and reluctant  Patient was informed that she had significant hypoxemia and if there is no confirmed sleep apnea diagnosis, nocturnal oxygen will be recommended  She is even reluctant to consider that option  Patient is even denying any hypersomnia, and she was brought in because her daughter was strongly convinced that she is pathologically sleepy, which the patient denies  She was strongly encouraged to discuss with her primary care physician and I would be more than happy to readdress based on her final decision and we will leave the door open and she can come back whenever she makes up her decision  My primary recommendation would be to do the in-lab polysomnography since she has hypoxemia and we need to figure out exactly the underlying cause and treat  My secondary recommendation would be to consider nocturnal oxygen if the patient is not willing to do the sleep study in the first place.  Further recommendation to follow depending on the patient's final decision        Orders Placed This Encounter   Procedures    Pulmonary Results Scan     No orders of the defined types were placed in this encounter.    Return if symptoms worsen or fail to improve.    Rosi Stafford MD   [facility]   06/24/25  16:13 EDT    Dictated utilizing Dragon dictation

## (undated) DEVICE — CONN JET HYDRA H20 AUXILIARY DISP

## (undated) DEVICE — Device

## (undated) DEVICE — THE SINGLE USE ETRAP – POLYP TRAP IS USED FOR SUCTION RETRIEVAL OF ENDOSCOPICALLY REMOVED POLYPS.: Brand: ETRAP

## (undated) DEVICE — SUREFIT, DUAL DISPERSIVE ELECTRODE, CONTACT QUALITY MONITOR: Brand: SUREFIT

## (undated) DEVICE — Device: Brand: DEFENDO AIR/WATER/SUCTION AND BIOPSY VALVE

## (undated) DEVICE — SNAR E/S POLYP SNAREMASTER OVL/10MM 2.8X2300MM YEL

## (undated) DEVICE — Device: Brand: SINGLE USE INJECTOR NM600/610

## (undated) DEVICE — SOL IRRG H2O PL/BG 1000ML STRL

## (undated) DEVICE — SOLIDIFIER LIQLOC PLS 1500CC BT

## (undated) DEVICE — LINER SURG CANSTR SXN S/RIGD 1500CC

## (undated) DEVICE — INJ SUB/MUCUS ELEVIEW FOR/GI/ENDO/PROC AMPL/10ML